# Patient Record
Sex: FEMALE | Race: WHITE | NOT HISPANIC OR LATINO | Employment: OTHER | URBAN - METROPOLITAN AREA
[De-identification: names, ages, dates, MRNs, and addresses within clinical notes are randomized per-mention and may not be internally consistent; named-entity substitution may affect disease eponyms.]

---

## 2017-03-13 ENCOUNTER — ALLSCRIPTS OFFICE VISIT (OUTPATIENT)
Dept: OTHER | Facility: OTHER | Age: 82
End: 2017-03-13

## 2017-08-04 ENCOUNTER — ALLSCRIPTS OFFICE VISIT (OUTPATIENT)
Dept: OTHER | Facility: OTHER | Age: 82
End: 2017-08-04

## 2017-08-04 ENCOUNTER — TRANSCRIBE ORDERS (OUTPATIENT)
Dept: ADMINISTRATIVE | Facility: HOSPITAL | Age: 82
End: 2017-08-04

## 2017-08-04 DIAGNOSIS — Z85.3 PERSONAL HISTORY OF MALIGNANT NEOPLASM OF BREAST: Primary | ICD-10-CM

## 2017-10-04 DIAGNOSIS — E78.5 HYPERLIPIDEMIA: ICD-10-CM

## 2017-10-04 DIAGNOSIS — E03.9 HYPOTHYROIDISM: ICD-10-CM

## 2017-10-04 DIAGNOSIS — E79.0 HYPERURICEMIA WITHOUT SIGNS OF INFLAMMATORY ARTHRITIS AND TOPHACEOUS DISEASE: ICD-10-CM

## 2017-10-04 DIAGNOSIS — I10 ESSENTIAL (PRIMARY) HYPERTENSION: ICD-10-CM

## 2017-11-15 DIAGNOSIS — Z85.3 PERSONAL HISTORY OF MALIGNANT NEOPLASM OF BREAST: ICD-10-CM

## 2017-11-27 ENCOUNTER — HOSPITAL ENCOUNTER (OUTPATIENT)
Dept: MAMMOGRAPHY | Facility: CLINIC | Age: 82
Discharge: HOME | End: 2017-11-27
Payer: MEDICARE

## 2017-11-27 ENCOUNTER — TRANSCRIBE ORDERS (OUTPATIENT)
Dept: LAB | Facility: CLINIC | Age: 82
End: 2017-11-27

## 2017-11-27 ENCOUNTER — APPOINTMENT (OUTPATIENT)
Dept: LAB | Facility: CLINIC | Age: 82
End: 2017-11-27
Payer: MEDICARE

## 2017-11-27 DIAGNOSIS — E79.0 HYPERURICEMIA WITHOUT SIGNS OF INFLAMMATORY ARTHRITIS AND TOPHACEOUS DISEASE: ICD-10-CM

## 2017-11-27 DIAGNOSIS — I10 ESSENTIAL (PRIMARY) HYPERTENSION: ICD-10-CM

## 2017-11-27 DIAGNOSIS — E03.9 HYPOTHYROIDISM: ICD-10-CM

## 2017-11-27 DIAGNOSIS — Z85.3 PERSONAL HISTORY OF MALIGNANT NEOPLASM OF BREAST: ICD-10-CM

## 2017-11-27 DIAGNOSIS — E78.5 HYPERLIPIDEMIA: ICD-10-CM

## 2017-11-27 LAB
ALBUMIN SERPL BCP-MCNC: 3.2 G/DL (ref 3.5–5)
ALP SERPL-CCNC: 188 U/L (ref 46–116)
ALT SERPL W P-5'-P-CCNC: 28 U/L (ref 12–78)
ANION GAP SERPL CALCULATED.3IONS-SCNC: 6 MMOL/L (ref 4–13)
AST SERPL W P-5'-P-CCNC: 21 U/L (ref 5–45)
BILIRUB SERPL-MCNC: 0.66 MG/DL (ref 0.2–1)
BUN SERPL-MCNC: 24 MG/DL (ref 5–25)
CALCIUM SERPL-MCNC: 8.9 MG/DL (ref 8.3–10.1)
CHLORIDE SERPL-SCNC: 104 MMOL/L (ref 100–108)
CHOLEST SERPL-MCNC: 255 MG/DL (ref 50–200)
CK SERPL-CCNC: 38 U/L (ref 26–192)
CO2 SERPL-SCNC: 28 MMOL/L (ref 21–32)
CREAT SERPL-MCNC: 1.39 MG/DL (ref 0.6–1.3)
ERYTHROCYTE [DISTWIDTH] IN BLOOD BY AUTOMATED COUNT: 14 % (ref 11.6–15.1)
GFR SERPL CREATININE-BSD FRML MDRD: 34 ML/MIN/1.73SQ M
GLUCOSE P FAST SERPL-MCNC: 117 MG/DL (ref 65–99)
HCT VFR BLD AUTO: 39.8 % (ref 34.8–46.1)
HDLC SERPL-MCNC: 57 MG/DL (ref 40–60)
HGB BLD-MCNC: 13.2 G/DL (ref 11.5–15.4)
LDLC SERPL CALC-MCNC: 164 MG/DL (ref 0–100)
MCH RBC QN AUTO: 30.1 PG (ref 26.8–34.3)
MCHC RBC AUTO-ENTMCNC: 33.2 G/DL (ref 31.4–37.4)
MCV RBC AUTO: 91 FL (ref 82–98)
PLATELET # BLD AUTO: 259 THOUSANDS/UL (ref 149–390)
PMV BLD AUTO: 11.8 FL (ref 8.9–12.7)
POTASSIUM SERPL-SCNC: 5 MMOL/L (ref 3.5–5.3)
PROT SERPL-MCNC: 7.2 G/DL (ref 6.4–8.2)
RBC # BLD AUTO: 4.39 MILLION/UL (ref 3.81–5.12)
SODIUM SERPL-SCNC: 138 MMOL/L (ref 136–145)
TRIGL SERPL-MCNC: 172 MG/DL
TSH SERPL DL<=0.05 MIU/L-ACNC: 2.5 UIU/ML (ref 0.36–3.74)
URATE SERPL-MCNC: 7.9 MG/DL (ref 2–6.8)
WBC # BLD AUTO: 10.9 THOUSAND/UL (ref 4.31–10.16)

## 2017-11-27 PROCEDURE — 80061 LIPID PANEL: CPT

## 2017-11-27 PROCEDURE — 84443 ASSAY THYROID STIM HORMONE: CPT

## 2017-11-27 PROCEDURE — 36415 COLL VENOUS BLD VENIPUNCTURE: CPT

## 2017-11-27 PROCEDURE — 84550 ASSAY OF BLOOD/URIC ACID: CPT

## 2017-11-27 PROCEDURE — 80053 COMPREHEN METABOLIC PANEL: CPT

## 2017-11-27 PROCEDURE — G0279 TOMOSYNTHESIS, MAMMO: HCPCS

## 2017-11-27 PROCEDURE — 82550 ASSAY OF CK (CPK): CPT

## 2017-11-27 PROCEDURE — 85027 COMPLETE CBC AUTOMATED: CPT

## 2017-11-27 PROCEDURE — G0204 DX MAMMO INCL CAD BI: HCPCS

## 2017-11-28 ENCOUNTER — GENERIC CONVERSION - ENCOUNTER (OUTPATIENT)
Dept: FAMILY MEDICINE CLINIC | Facility: CLINIC | Age: 82
End: 2017-11-28

## 2017-11-28 ENCOUNTER — GENERIC CONVERSION - ENCOUNTER (OUTPATIENT)
Dept: OTHER | Facility: OTHER | Age: 82
End: 2017-11-28

## 2018-01-13 VITALS
DIASTOLIC BLOOD PRESSURE: 78 MMHG | SYSTOLIC BLOOD PRESSURE: 138 MMHG | BODY MASS INDEX: 36.92 KG/M2 | WEIGHT: 216.25 LBS | HEIGHT: 64 IN

## 2018-01-13 VITALS
WEIGHT: 218 LBS | SYSTOLIC BLOOD PRESSURE: 140 MMHG | BODY MASS INDEX: 37.22 KG/M2 | HEIGHT: 64 IN | DIASTOLIC BLOOD PRESSURE: 80 MMHG

## 2018-01-14 VITALS
OXYGEN SATURATION: 96 % | SYSTOLIC BLOOD PRESSURE: 130 MMHG | HEIGHT: 64 IN | TEMPERATURE: 97.7 F | BODY MASS INDEX: 37.09 KG/M2 | HEART RATE: 66 BPM | RESPIRATION RATE: 13 BRPM | DIASTOLIC BLOOD PRESSURE: 82 MMHG | WEIGHT: 217.25 LBS

## 2018-01-15 NOTE — PROGRESS NOTES
Assessment   1  Depression (311) (F32 9)  2  Generalized anxiety disorder (300 02) (F41 1)  3  Encounter for preventive health examination (V70 0) (Z00 00)1      1 Amended By: Marcio Mcneal; Nov 18 2016 10:08 AM EST    Plan  Depression, Generalized anxiety disorder    · Renew: Sertraline HCl - 50 MG Oral Tablet; TAKE 1 TABLET BY MOUTH DAILY IN  MORNING  Insomnia    · Renew: Eszopiclone 2 MG Oral Tablet (Lunesta); TAKE 1 TABLET AT BEDTIME DAILY  AS NEEDED FOR SLEEP  Need for influenza vaccination    · Administered: Fluzone High-Dose 0 5 ML Intramuscular Suspension Prefilled Syringe    Chief Complaint  Pt is here for a Medicare Wellness Exam  Pt is here for a three month fu for HL  History of Present Illness  Welcome to Medicare and Wellness Visits: The patient is being seen for the initial annual wellness visit  Medicare Screening and Risk Factors   Hospitalizations: no previous hospitalizations  Medicare Screening Tests Risk Questions   Drug and Alcohol Use: The patient is a former cigarette smoker  The patient reports occasional alcohol use and drinking 1 drinks per month  She has never used illicit drugs  Diet and Physical Activity: Current diet includes well balanced meals, limited junk food, 2 servings of fruit per day, 1 servings of vegetables per day, 2 servings of meat per day, 2 servings of whole grains per day, 3 servings of simple carbohydrates per day, 2 servings of dairy products per day, 1 cups of coffee per day, 1 cups of tea per day and 4 glasses of water/day  She exercises infrequently  Exercise: walking 5 minutes per day  Mood Disorder and Cognitive Impairment Screening: She reports feeling down, depressed, or hopeless over the past two weeks  She reports feeling little interest or pleasure in doing things over the past two weeks     Cognitive impairment screening: denies difficulty learning/retaining new information, denies difficulty handling complex tasks, difficulty with reasoning, denies difficulty with spatial ability and orientation, denies difficulty with language and denies difficulty with behavior  Functional Ability/Level of Safety: Hearing is significantly decreased and a hearing aid is not used  She reports hearing difficulties  Activities of daily living details: transportation help needed, needs help shopping, meal preparation help needed and needs help doing laundry, but does not need help using the phone, does not need help doing housework, does not need help managing medications and does not need help managing money  Fall risk factors: The patient fell 0 times in the past 12 months  Home safety risk factors:  no unfamiliar surroundings, no loose rugs, no poor household lighting, no uneven floors, no household clutter, grab bars in the bathroom and handrails on the stairs  Advance Directives: Advance directives: living will, durable power of  for health care directives and advance directives  Co-Managers and Medical Equipment/Suppliers: See Patient Care Team   Preventive Quality Program 65 and Older: Falls Risk: The patient fell 0 times in the past 12 months  The patient is currently asymptomatic Symptoms Include:  Associated symptoms:  No associated symptoms are reported   Urinary Incontinence Symptoms includes: urinary incontinence, urinary frequency, urinary urgency, nocturia and post-void dribbling, but no incomplete bladder emptying, no urinary hesitancy, no dysuria, no straining, no weak stream, no intermittent stream, no vaginal pressure and no vaginal dryness    Date of last glaucoma screen was 10/06/2016      Patient Care Team    Care Team Member Role Specialty Office Number   [id number] [address] Attending Family Medicine [phone]   Leydi Rose MD  Cardiology [phone]   Marquita Cooper MD  Surgical Oncology [phone]   [facility] MD [address] [phone]   500 [facility], [address] [place] [address]  172[facility] Specialist Orthopedic Surgery [phone]     Active Problems   1  3-vessel coronary artery disease (414 00) (I25 10)  2  Acute gouty arthropathy (274 01) (M10 9)  3  Acute Gouty Arthropathy (274 01)  4  Arthritis (716 90) (M19 90)  5  Benign essential hypertension (401 1) (I10)  6  Breast cancer (174 9) (C50 919)  7  Breast lump (611 72) (N63)  8  Breast lump on right side at 10 o'clock position (611 72) (N63)  9  Candidal intertrigo (112 3) (B37 2)  10  Changing mole (216 9) (D22 9)  11  Depression (311) (F32 9)  12  Elevated serum alkaline phosphatase level (790 5) (R74 8)  13  Esophageal reflux (530 81) (K21 9)  14  Gait disturbance (781 2) (R26 9)  15  Generalized anxiety disorder (300 02) (F41 1)  16  Generalized osteoarthritis (715 00) (M15 9)  17  Hallucinations (780 1) (R44 3)  18  Heart disease (429 9) (I51 9)  19  Hyperlipidemia (272 4) (E78 5)  20  Hyperuricemia (790 6) (E79 0)  21  Influenza B (487 1) (J10 1)  22  Insomnia (780 52) (G47 00)  23  Knee pain, left (719 46) (M25 562)  24  Mild vitamin D deficiency (268 9) (E55 9)  25  Need for influenza vaccination (V04 81) (Z23)  26  Need for Tdap vaccination (V06 1) (Z23)  27  Neoplasm of uncertain behavior of skin of face (238 2) (D48 5)  28  Obesity (278 00) (E66 9)  29  Other chronic pain (338 29) (G89 29)  30  PAD (peripheral artery disease) (443 9) (I73 9)  31  Peripheral neuropathy (356 9) (G62 9)  32  Polymyalgia rheumatica (725) (M35 3)  33  Primary hypothyroidism (244 9) (E03 9)  34  Primary localized osteoarthritis of both knees (715 16) (M17 0)  35  Right knee pain (719 46) (M25 561)  36  Rotator cuff injury (959 2) (S46 009A)  37  Rotator cuff tear arthropathy, unspecified laterality (716 81) (M12 819)  38  Sacroiliitis (720 2) (M46 1)  39  Screening for neurological condition (V80 09) (Z13 89)  40  Seborrheic keratosis (702 19) (L82 1)  41  Spinal stenosis (724 00) (M48 00)  42   Venous insufficiency (459 81) (I87 2)    Past Medical History    · History of Acute pharyngitis (462) (J02 9)   · History of Acute upper respiratory infection (465 9) (J06 9)   · History of Arthritis (V13 4)   · History of Glaucoma screening (V80 1) (Z13 5)   · History of coronary artery bypass, six+ (V15 1) (Z95 1)   · History of malignant neoplasm of breast (V10 3) (Z85 3)   · History of malignant neoplasm of breast (V10 3) (Z85 3)   · History of urinary incontinence (V13 09) (I30 683)   · History of Right knee pain (719 46) (M25 561)   · History of Sciatica (724 3) (M54 30)   · History of Screening for depression (V79 0) (Z13 89)   · History of Screening for genitourinary condition (V81 6) (Z13 89)   · History of Skin lesion (709 9) (L98 9)   · History of Tinea pedis (110 4) (B35 3)   · History of Upper arm pain (729 5) (M79 629)    Surgical History    · History of Biopsy Breast Percutaneous Needle Core   · History of Breast Surgery Lumpectomy   · History of Cataract Surgery   · History of Heart Surgery   · History of Rotator Cuff Repair   · History of Tonsillectomy   · History of Total Abdominal Hysterectomy    Family History  Mother    · Family history of Maternal Coagulation Defect Complicating Pregnancy / Childbirth /  Puerperium  Father    · Family history of Emphysema  Daughter    · Family history of melanoma (V16 8) (Z80 8)  Sister    · Family history of Pacemaker Placement  Brother    · Family history of Coronary Arteriosclerosis (V17 49)    Social History    · Being A Social Drinker   · Denied: History of Current Smoker   · Former smoker (V15 82) (F24 614)   · Lives with adult children    Current Meds  1  Anastrozole 1 MG Oral Tablet; TAKE 1 TABLET DAILY; Therapy: 52KFL9237 to (Evaluate:07Trj8953)  Requested for: 77VRU9413; Last   Rx:22Jun2016 Ordered  2  Aspirin 81 MG Oral Tablet Chewable; CHEW AND SWALLOW 1 TABLET DAILY; Therapy: 11Aug2015 to (Evaluate:21Eyw0192)  Requested for: 11Aug2015; Last   Rx:11Aug2015 Ordered  3  Chlorthalidone 25 MG Oral Tablet; take 1/2 tablet daily Recorded  4  Levothyroxine Sodium 50 MCG Oral Tablet; take 1 tablet by mouth daily; Therapy: 66AMM2485 to (Evaluate:14Ojg9640)  Requested for: 72Sbp3249; Last   Rx:19Sep2016 Ordered  5  Lisinopril 40 MG Oral Tablet; Take 1 tablet daily; Therapy: 31JJS8615 to (Evaluate:31Jan2017)  Requested for: 26BMM0691; Last   Rx:02Nov2016 Ordered  6  LORazepam 0 5 MG Oral Tablet; take 1 tablet by mouth once daily if needed; Therapy: 81KKH1929 to (Evaluate:27Nov2016)  Requested for: 19Wmh1201; Last   Rx:19Hoc5417 Ordered  7  Magnesium TABS; Therapy: (9651 1475) to Recorded  8  Meloxicam 7 5 MG Oral Tablet; Take 1 tablet twice daily as needed; Therapy: 45AWU1676 to (Soledad Reilly)  Requested for: 67LNX2676; Last   Rx:65Doe0416 Ordered  9  Metoprolol Succinate  MG Oral Tablet Extended Release 24 Hour; take 1/2 tablet   by mouth three  a day; Therapy: 45XMI8551 to (Evaluate:54Toc9544)  Requested for: 17Aug2016; Last   Rx:17Aug2016 Ordered  10  RaNITidine HCl - 150 MG Oral Tablet; TAKE ONE TABLET BY MOUTH TWO TIMES    DAILY; Therapy: 79TVC5141 to (Evaluate:19Jan2017)  Requested for: 22Oct2016; Last    Rx:21Oct2016 Ordered  11  Sertraline HCl - 50 MG Oral Tablet; TAKE 1 TABLET BY MOUTH DAILY AS DIRECTED    Recorded  12  Vitamin B12 TABS; Therapy: (Recorded:19Yda4187) to Recorded    Allergies   1  Demerol TABS  2  TraMADol HCl TABS    Immunizations   ** Printed in Appendix #1 below  Vitals  Signs    Systolic: 560  Diastolic: 82   Height: 5 ft 4 5 in  Weight: 210 lb 8 0 oz  BMI Calculated: 35 57  BSA Calculated: 2 01    Future Appointments    Date/Time Provider Specialty Site   11/14/2017 02:00 PM DONTE Bravo   Hematology Oncology [facility] [place]   15/80/8013 99:62 PM Elizabeth Soto, [address] [place] [address]   12/06/2016 04:20 PM Majo Jones MD Cardiology [facility]   11/21/2016 01:30 PM Melinda Guzmán MD Surgical Oncology [facility]   2016 03:10 PM Patric Dennis DO Orthopedic Surgery [facility] [address]   2016 03:10 PM Patric Dennis DO Orthopedic Surgery [facility] [address]     Signatures   Electronically signed by : Arash Ray DO; 966 10:08AM EST                       (Author)    Appendix #1     Patient: Maribell Wilson ; : 1930; MRN: [id number]      1 2 3 4    Influenza  24-Oct-2012  (81y) 09-Oct-2013  (82y) 17-Sep-2014  (83y) 09-Oct-2015  (84y)    PPSV  17-Sep-2011  ([de-identified])       Tdap  Permanently Deferred: Medical Deferral, Insurance does not cover  , [id number]       Zoster  2013  (82y)

## 2018-01-16 NOTE — RESULT NOTES
Message   Mild/moderate medial compartment degenerative changes of right knee and degenerative changes left knee  Rec  OAA consult if not better  Verified Results  * XR KNEE 3 VW RIGHT NON INJURY 40Ulz6109 02:54PM Jeannette Cruz Order Number: [id number]     Test Name Result Flag Reference   XR KNEE 3 VW RIGHT (Report)     RIGHT KNEE     INDICATION: ight and left knee 3 vw non injury dx: M10 00 idiopathic gout and M25 562 pain in left knee History/Symptoms - left lateral knee pain and right posterior knee pain x 4 days  no trauma     COMPARISON: None     VIEWS: 3; 3 images     FINDINGS:     There is no acute fracture or dislocation  There is no joint effusion  Mild/moderate medial compartment degenerative changes with moderate joint space narrowing  No lytic or blastic lesions are seen  Soft tissues are unremarkable  IMPRESSION:     Mild/moderate medial compartment degenerative changes         Workstation performed: [id number]     Signed by:   Mariia Macario MD   9/13/16

## 2018-01-22 VITALS
SYSTOLIC BLOOD PRESSURE: 122 MMHG | RESPIRATION RATE: 16 BRPM | HEIGHT: 64 IN | TEMPERATURE: 96.8 F | OXYGEN SATURATION: 97 % | BODY MASS INDEX: 36.88 KG/M2 | DIASTOLIC BLOOD PRESSURE: 86 MMHG | HEART RATE: 68 BPM | WEIGHT: 216 LBS

## 2018-01-22 VITALS — HEIGHT: 64 IN | WEIGHT: 215.5 LBS | BODY MASS INDEX: 36.79 KG/M2

## 2018-01-22 VITALS — SYSTOLIC BLOOD PRESSURE: 122 MMHG | DIASTOLIC BLOOD PRESSURE: 62 MMHG

## 2018-01-30 DIAGNOSIS — R12 HEARTBURN: Primary | ICD-10-CM

## 2018-01-30 RX ORDER — RANITIDINE 150 MG/1
1 TABLET ORAL 2 TIMES DAILY
COMMUNITY
Start: 2013-05-20 | End: 2018-01-30 | Stop reason: SDUPTHER

## 2018-01-30 RX ORDER — RANITIDINE 150 MG/1
150 TABLET ORAL 2 TIMES DAILY
Qty: 180 TABLET | Refills: 0 | Status: SHIPPED | OUTPATIENT
Start: 2018-01-30 | End: 2018-04-27 | Stop reason: SDUPTHER

## 2018-01-31 DIAGNOSIS — G47.00 INSOMNIA, UNSPECIFIED TYPE: Primary | ICD-10-CM

## 2018-01-31 RX ORDER — ZOLPIDEM TARTRATE 5 MG/1
1 TABLET ORAL
COMMUNITY
Start: 2013-08-26 | End: 2018-01-31 | Stop reason: SDUPTHER

## 2018-01-31 RX ORDER — ZOLPIDEM TARTRATE 5 MG/1
5 TABLET ORAL
Qty: 90 TABLET | Refills: 0 | OUTPATIENT
Start: 2018-01-31 | End: 2018-04-20 | Stop reason: SDUPTHER

## 2018-03-01 DIAGNOSIS — I10 BENIGN ESSENTIAL HYPERTENSION: Primary | ICD-10-CM

## 2018-03-02 RX ORDER — METOPROLOL SUCCINATE 200 MG/1
TABLET, EXTENDED RELEASE ORAL
Qty: 135 TABLET | Refills: 2 | Status: SHIPPED | OUTPATIENT
Start: 2018-03-02 | End: 2018-12-03 | Stop reason: SDUPTHER

## 2018-03-08 DIAGNOSIS — F41.9 ANXIETY: Primary | ICD-10-CM

## 2018-03-08 RX ORDER — LORAZEPAM 0.5 MG/1
0.5 TABLET ORAL 2 TIMES DAILY
Qty: 180 TABLET | Refills: 0 | OUTPATIENT
Start: 2018-03-08 | End: 2018-08-29 | Stop reason: SDUPTHER

## 2018-03-08 RX ORDER — LORAZEPAM 0.5 MG/1
TABLET ORAL
COMMUNITY
Start: 2012-07-09 | End: 2018-03-08 | Stop reason: SDUPTHER

## 2018-03-26 DIAGNOSIS — F32.A DEPRESSION, UNSPECIFIED DEPRESSION TYPE: Primary | ICD-10-CM

## 2018-04-20 DIAGNOSIS — G47.00 INSOMNIA, UNSPECIFIED TYPE: ICD-10-CM

## 2018-04-20 RX ORDER — ZOLPIDEM TARTRATE 5 MG/1
5 TABLET ORAL
Qty: 90 TABLET | Refills: 0 | Status: SHIPPED | OUTPATIENT
Start: 2018-04-20 | End: 2018-08-03 | Stop reason: SDUPTHER

## 2018-04-27 DIAGNOSIS — I10 HYPERTENSION, UNSPECIFIED TYPE: Primary | ICD-10-CM

## 2018-04-27 DIAGNOSIS — R12 HEARTBURN: ICD-10-CM

## 2018-04-27 RX ORDER — LISINOPRIL 40 MG/1
1 TABLET ORAL DAILY
COMMUNITY
Start: 2012-01-12 | End: 2018-04-27 | Stop reason: SDUPTHER

## 2018-04-27 RX ORDER — RANITIDINE 150 MG/1
150 TABLET ORAL 2 TIMES DAILY
Qty: 180 TABLET | Refills: 1 | Status: SHIPPED | OUTPATIENT
Start: 2018-04-27 | End: 2018-10-24 | Stop reason: SDUPTHER

## 2018-04-27 RX ORDER — LISINOPRIL 40 MG/1
40 TABLET ORAL DAILY
Qty: 90 TABLET | Refills: 1 | Status: SHIPPED | OUTPATIENT
Start: 2018-04-27 | End: 2018-10-24 | Stop reason: SDUPTHER

## 2018-05-01 PROBLEM — C50.411 MALIGNANT NEOPLASM OF UPPER-OUTER QUADRANT OF RIGHT BREAST IN FEMALE, ESTROGEN RECEPTOR POSITIVE (HCC): Status: ACTIVE | Noted: 2018-05-01

## 2018-05-01 PROBLEM — Z17.0 MALIGNANT NEOPLASM OF UPPER-OUTER QUADRANT OF RIGHT BREAST IN FEMALE, ESTROGEN RECEPTOR POSITIVE (HCC): Status: ACTIVE | Noted: 2018-05-01

## 2018-05-01 PROBLEM — N39.46 MIXED STRESS AND URGE URINARY INCONTINENCE: Status: ACTIVE | Noted: 2017-03-13

## 2018-05-01 PROBLEM — H35.30 MACULAR DEGENERATION: Status: ACTIVE | Noted: 2017-03-13

## 2018-06-13 RX ORDER — ALLOPURINOL 100 MG/1
TABLET ORAL
Refills: 0 | COMMUNITY
Start: 2018-05-25 | End: 2018-08-22 | Stop reason: SDUPTHER

## 2018-06-13 RX ORDER — ANASTROZOLE 1 MG/1
1 TABLET ORAL DAILY
Refills: 0 | COMMUNITY
Start: 2018-05-01 | End: 2018-10-29 | Stop reason: SDUPTHER

## 2018-06-13 RX ORDER — VIT A/VIT C/VIT E/ZINC/COPPER 4296-226
CAPSULE ORAL
COMMUNITY
Start: 2017-03-13

## 2018-06-13 RX ORDER — LANOLIN ALCOHOL/MO/W.PET/CERES
CREAM (GRAM) TOPICAL
COMMUNITY

## 2018-06-13 RX ORDER — LEVOTHYROXINE SODIUM 0.05 MG/1
TABLET ORAL
Refills: 0 | COMMUNITY
Start: 2018-03-09 | End: 2018-09-05 | Stop reason: SDUPTHER

## 2018-06-13 RX ORDER — ASPIRIN 81 MG/1
1 TABLET, CHEWABLE ORAL DAILY
COMMUNITY
Start: 2015-08-11

## 2018-06-13 RX ORDER — MELOXICAM 7.5 MG/1
1 TABLET ORAL 2 TIMES DAILY PRN
COMMUNITY
Start: 2016-09-28 | End: 2018-11-23 | Stop reason: SDUPTHER

## 2018-06-14 ENCOUNTER — OFFICE VISIT (OUTPATIENT)
Dept: SURGICAL ONCOLOGY | Facility: CLINIC | Age: 83
End: 2018-06-14
Payer: MEDICARE

## 2018-06-14 VITALS
TEMPERATURE: 98.1 F | WEIGHT: 209 LBS | DIASTOLIC BLOOD PRESSURE: 80 MMHG | SYSTOLIC BLOOD PRESSURE: 140 MMHG | HEART RATE: 60 BPM | RESPIRATION RATE: 18 BRPM | BODY MASS INDEX: 35.68 KG/M2 | HEIGHT: 64 IN

## 2018-06-14 DIAGNOSIS — Z17.0 MALIGNANT NEOPLASM OF UPPER-OUTER QUADRANT OF RIGHT BREAST IN FEMALE, ESTROGEN RECEPTOR POSITIVE (HCC): Primary | ICD-10-CM

## 2018-06-14 DIAGNOSIS — C50.411 MALIGNANT NEOPLASM OF UPPER-OUTER QUADRANT OF RIGHT BREAST IN FEMALE, ESTROGEN RECEPTOR POSITIVE (HCC): Primary | ICD-10-CM

## 2018-06-14 PROCEDURE — 99213 OFFICE O/P EST LOW 20 MIN: CPT | Performed by: NURSE PRACTITIONER

## 2018-06-14 NOTE — PROGRESS NOTES
Surgical Oncology Follow Up       [address] [facility]  [facility] [place]  [address] [place] [address] [facility]  12/24/1930  [id number]  [address] [facility],[address] [facility]  [facility] [place]  [address] [place] [id number]    Chief Complaint   Patient presents with    Breast Cancer     Patient is here for a 9 month follow up       Assessment/Plan:  1  Malignant neoplasm of upper-outer quadrant of right breast in female, estrogen receptor positive ([address] )  - 9 mo f/u visit       Discussion/Summary:  Patient is an 80-year-old female who presents today for a six-month follow-up visit for right breast cancer diagnosed in May 2014  Her pathology revealed invasive ductal carcinoma, ER/%, her 2-  She underwent a right lumpectomy and sentinel node biopsy by Dr Flor Sagastume  She is currently taking anastrozole  She had a bilateral diagnostic mammogram performed on November 27, 2017 which was BI-RADS 2  No new complaints today  She denies headaches, back pain, bone pain, cough, shortness of breath, abdominal pain  She notices no changes on her self exam   The patient wishes to no longer undergo annual mammograms  She states that she performs self exams and she will contact us if she appreciates any changes  Given her age, I feel that this is reasonable  I did explain that mammograms can potentially detect a cancer at a smaller size than once that is palpable on physical exam   Patient understands this and states that she does not want to go through the process of mammograms anymore, as they are painful for her  No worrisome findings on today's exam  We will plan to see the patient back in 9 mo, or sooner if the need arises  She was instructed to call with any new concerns or symptoms  All of her questions were answered       History of Present Illness:        Malignant neoplasm of upper-outer quadrant of right breast in female, estrogen receptor positive ([facility] [address] )    5/27/2014 Initial Diagnosis     Malignant neoplasm of upper-outer quadrant of right breast in female, estrogen receptor positive ([facility] [address] )    Right breast biopsy, 11:00  Invasive ductal carcinoma  %  %  HER-2 negative         7/15/2014 Surgery     Right lumpectomy, [place] node biopsy (Dr Leonid Aburto)    Stage IIA - pT2, pN0, Grade 2         9/2014 -  Hormone Therapy     Anastrozole (Dr Stacey Murphy)             -Interval History:  Patient presents today for a 9 month follow-up visit for right breast cancer diagnosed in 2014  She had a bilateral mammogram performed on November 27, 2017 which was BI-RADS 2  She has no new complaints today  Review of Systems:  Review of Systems   Constitutional: Negative for activity change, appetite change, chills, fatigue, fever and unexpected weight change  HENT: Negative for trouble swallowing  Eyes: Negative for pain, redness and visual disturbance  Respiratory: Negative for cough, shortness of breath and wheezing  Cardiovascular: Negative for chest pain, palpitations and leg swelling  Gastrointestinal: Negative for abdominal pain, constipation, diarrhea, nausea and vomiting  Endocrine: Negative for cold intolerance and heat intolerance  Musculoskeletal: Negative for arthralgias, back pain, gait problem and myalgias  Skin: Negative for color change and rash  Neurological: Negative for dizziness, syncope, light-headedness, numbness and headaches  Hematological: Negative for adenopathy  Psychiatric/Behavioral: Negative for agitation and confusion  All other systems reviewed and are negative        Patient Active Problem List   Diagnosis    Benign essential hypertension    3-vessel coronary artery disease    Depression    Elevated serum alkaline phosphatase level    Esophageal reflux    Gait disturbance    Generalized anxiety disorder    Generalized osteoarthritis    Hyperlipidemia    Hyperuricemia    Insomnia    Macular degeneration    Mild vitamin D deficiency    Mixed stress and urge urinary incontinence    Obesity    Other chronic pain    PAD (peripheral artery disease) (HCC)    Peripheral neuropathy    Primary hypothyroidism    Primary localized osteoarthritis of both knees    Spinal stenosis    Malignant neoplasm of upper-outer quadrant of right breast in female, estrogen receptor positive ([facility] [address] )     Past Medical History:   Diagnosis Date    Arthritis     Malignant neoplasm of breast ([facility] [address] )     Resolved: Oct 26, 2015    Sciatica     last assessed: July 2, 2013     Past Surgical History:   Procedure Laterality Date    BREAST BIOPSY      BREAST LUMPECTOMY      CARDIAC SURGERY      CATARACT EXTRACTION      ROTATOR CUFF REPAIR      TONSILLECTOMY      TOTAL ABDOMINAL HYSTERECTOMY       Family History   Problem Relation Age of Onset    Other Mother         Maternal coagulation defect complicating pregnancy/childbirth/puerperium     Emphysema Father     Other Sister         Pacemaker Placement     Coronary artery disease Brother     Melanoma Daughter      Social History     Social History    Marital status:      Spouse name: N/A    Number of children: N/A    Years of education: N/A     Occupational History    Not on file       Social History Main Topics    Smoking status: Former Smoker    Smokeless tobacco: Not on file    Alcohol use Yes      Comment: social     Drug use: Unknown    Sexual activity: Not on file     Other Topics Concern    Not on file     Social History Narrative    Lives with adult children        Current Outpatient Prescriptions:     allopurinol (ZYLOPRIM) 100 mg tablet, , Disp: , Rfl: 0    anastrozole (ARIMIDEX) 1 mg tablet, Take 1 mg by mouth daily, Disp: , Rfl: 0    aspirin 81 mg chewable tablet, Chew 1 tablet daily, Disp: , Rfl:     cyanocobalamin (VITAMIN B-12) 1,000 mcg tablet, Take by mouth, Disp: , Rfl:     levothyroxine 50 mcg tablet, , Disp: , Rfl: 0   lisinopril (ZESTRIL) 40 mg tablet, Take 1 tablet (40 mg total) by mouth daily for 180 days, Disp: 90 tablet, Rfl: 1    LORazepam (ATIVAN) 0 5 mg tablet, Take 1 tablet (0 5 mg total) by mouth 2 (two) times a day, Disp: 180 tablet, Rfl: 0    Magnesium 100 MG TABS, Take by mouth, Disp: , Rfl:     meloxicam (MOBIC) 7 5 mg tablet, Take 1 tablet by mouth 2 (two) times a day as needed, Disp: , Rfl:     metoprolol succinate (TOPROL-XL) 200 MG 24 hr tablet, TAKE 1/2 TABLET BY MOUTH THREE TIMES A DAY, Disp: 135 tablet, Rfl: 2    Multiple Vitamins-Minerals (PRESERVISION AREDS) CAPS, Take by mouth, Disp: , Rfl:     ranitidine (ZANTAC) 150 mg tablet, Take 1 tablet (150 mg total) by mouth 2 (two) times a day for 180 days, Disp: 180 tablet, Rfl: 1    sertraline (ZOLOFT) 50 mg tablet, Take 1 tablet (50 mg total) by mouth daily, Disp: 90 tablet, Rfl: 3    zolpidem (AMBIEN) 5 mg tablet, Take 1 tablet (5 mg total) by mouth daily at bedtime for 90 days, Disp: 90 tablet, Rfl: 0  Allergies   Allergen Reactions    Meperidine Nausea Only    Tramadol Nausea Only     There were no vitals filed for this visit  Physical Exam   Constitutional: She is oriented to person, place, and time  Vital signs are normal  She appears well-developed and well-nourished  No distress  Walks with cane   HENT:   Head: Normocephalic and atraumatic  Neck: Normal range of motion  Cardiovascular: Normal rate, regular rhythm and normal heart sounds  Well healed mid chest incision   Pulmonary/Chest: Effort normal and breath sounds normal    Bilateral breasts were examined in the sitting and supine position  Right breast surgical scar  There are no masses, skin nodules, nipple changes or nipple discharge  There is no bilateral supraclavicular or axillary lymphadenopathy noted  Abdominal: Soft  Normal appearance  She exhibits no mass  There is no hepatosplenomegaly  There is no tenderness  Musculoskeletal: Normal range of motion  Lymphadenopathy:     She has no axillary adenopathy  Right: No supraclavicular adenopathy present  Left: No supraclavicular adenopathy present  Neurological: She is alert and oriented to person, place, and time  Skin: Skin is warm, dry and intact  No rash noted  She is not diaphoretic  Psychiatric: She has a normal mood and affect  Her speech is normal    Vitals reviewed  Advance Care Planning/Advance Directives:  Discussed disease status, cancer treatment plans and/or cancer treatment goals with the patient

## 2018-06-15 ENCOUNTER — OFFICE VISIT (OUTPATIENT)
Dept: FAMILY MEDICINE CLINIC | Facility: CLINIC | Age: 83
End: 2018-06-15
Payer: MEDICARE

## 2018-06-15 VITALS
HEIGHT: 64 IN | BODY MASS INDEX: 35.58 KG/M2 | DIASTOLIC BLOOD PRESSURE: 78 MMHG | SYSTOLIC BLOOD PRESSURE: 138 MMHG | WEIGHT: 208.4 LBS

## 2018-06-15 DIAGNOSIS — I10 BENIGN ESSENTIAL HYPERTENSION: ICD-10-CM

## 2018-06-15 DIAGNOSIS — Z23 NEED FOR SHINGLES VACCINE: Primary | ICD-10-CM

## 2018-06-15 DIAGNOSIS — E03.9 PRIMARY HYPOTHYROIDISM: ICD-10-CM

## 2018-06-15 DIAGNOSIS — E78.00 PURE HYPERCHOLESTEROLEMIA: ICD-10-CM

## 2018-06-15 PROCEDURE — G0439 PPPS, SUBSEQ VISIT: HCPCS | Performed by: FAMILY MEDICINE

## 2018-06-15 NOTE — PROGRESS NOTES
Assessment and Plan:  Problem List Items Addressed This Visit        Endocrine    Primary hypothyroidism    Relevant Medications    levothyroxine 50 mcg tablet    Other Relevant Orders    TSH, 3rd generation       Cardiovascular and Mediastinum    Benign essential hypertension    Relevant Orders    Lipid Panel with Direct LDL reflex    Comprehensive metabolic panel       Other    Hyperlipidemia    Relevant Orders    Lipid Panel with Direct LDL reflex      Other Visit Diagnoses     Need for shingles vaccine    -  Primary        Health Maintenance Due   Topic Date Due    Fall Risk  12/24/1995    Urinary Incontinence Screening  12/24/1995    GLAUCOMA SCREENING 67+ YR  12/24/1997         HPI:  Kiley Duarte is a 80 y o  female here for her Subsequent Wellness Visit  Patient Active Problem List   Diagnosis    Benign essential hypertension    3-vessel coronary artery disease    Depression    Elevated serum alkaline phosphatase level    Esophageal reflux    Gait disturbance    Generalized anxiety disorder    Generalized osteoarthritis    Hyperlipidemia    Hyperuricemia    Insomnia    Macular degeneration    Mild vitamin D deficiency    Mixed stress and urge urinary incontinence    Obesity    Other chronic pain    PAD (peripheral artery disease) (HCC)    Peripheral neuropathy    Primary hypothyroidism    Primary localized osteoarthritis of both knees    Spinal stenosis    Malignant neoplasm of upper-outer quadrant of right breast in female, estrogen receptor positive ([address] )     Past Medical History:   Diagnosis Date    Arthritis     Malignant neoplasm of breast ([address] )     Resolved:  Oct 26, 2015    Sciatica     last assessed: July 2, 2013     Past Surgical History:   Procedure Laterality Date    BREAST BIOPSY      BREAST LUMPECTOMY      CARDIAC SURGERY      CATARACT EXTRACTION      CORONARY ARTERY BYPASS GRAFT  2009    ROTATOR CUFF REPAIR      TONSILLECTOMY      TOTAL ABDOMINAL HYSTERECTOMY       Family History   Problem Relation Age of Onset    Other Mother         Maternal coagulation defect complicating pregnancy/childbirth/puerperium     Emphysema Father     Other Sister         Pacemaker Placement     Coronary artery disease Brother     Melanoma Daughter      History   Smoking Status    Former Smoker   Smokeless Tobacco    Not on file     History   Alcohol Use    Yes     Comment: social       History   Drug use: Unknown         Current Outpatient Prescriptions   Medication Sig Dispense Refill    allopurinol (ZYLOPRIM) 100 mg tablet   0    anastrozole (ARIMIDEX) 1 mg tablet Take 1 mg by mouth daily  0    aspirin 81 mg chewable tablet Chew 1 tablet daily      cyanocobalamin (VITAMIN B-12) 1,000 mcg tablet Take by mouth      levothyroxine 50 mcg tablet   0    lisinopril (ZESTRIL) 40 mg tablet Take 1 tablet (40 mg total) by mouth daily for 180 days 90 tablet 1    LORazepam (ATIVAN) 0 5 mg tablet Take 1 tablet (0 5 mg total) by mouth 2 (two) times a day 180 tablet 0    Magnesium 100 MG TABS Take by mouth      meloxicam (MOBIC) 7 5 mg tablet Take 1 tablet by mouth 2 (two) times a day as needed      metoprolol succinate (TOPROL-XL) 200 MG 24 hr tablet TAKE 1/2 TABLET BY MOUTH THREE TIMES A  tablet 2    Multiple Vitamins-Minerals (PRESERVISION AREDS) CAPS Take by mouth      ranitidine (ZANTAC) 150 mg tablet Take 1 tablet (150 mg total) by mouth 2 (two) times a day for 180 days 180 tablet 1    sertraline (ZOLOFT) 50 mg tablet Take 1 tablet (50 mg total) by mouth daily 90 tablet 3    zolpidem (AMBIEN) 5 mg tablet Take 1 tablet (5 mg total) by mouth daily at bedtime for 90 days 90 tablet 0     No current facility-administered medications for this visit        Allergies   Allergen Reactions    Meperidine Nausea Only    Tramadol Nausea Only     Immunization History   Administered Date(s) Administered    Influenza Split High Dose Preservative Free IM 10/24/2012, 10/09/2013, 09/17/2014, 10/09/2015, 11/17/2016, 11/28/2017    Pneumococcal Conjugate 13-Valent 01/17/2011, 11/28/2017    Pneumococcal Polysaccharide PPV23 09/17/2011    Zoster 02/05/2013       Patient Care Team:  Polo Cruz DO as PCP - General  Jeneane Bush, DO Devorah January, MD Adriana Boxer, MD Deberah Poling, MD Cherre Redhead,     Medicare Screening Tests and Risk Assessments:  AWV Clinical     ISAR:       Once in a Lifetime Medicare Screening:       Medicare Screening Tests and Risk Assessment:   AAA Risk Assessment    Osteoporosis Risk Assessment    HIV Risk Assessment        Drug and Alcohol Use:   Tobacco use    Cigarettes:  former smoker    Smokeless:  never used smokeless tobacco    Tobacco use duration    Tobacco Cessation Readiness    Alcohol use    Alcohol use:  rare use    Alcohol Treatment Readiness   Illicit Drug Use    Drug use:  never        Diet & Exercise:   Diet   What is your diet?:  Regular   How many servings a day of the following:   Fruits and Vegetables:  1-2 Meat:  1-2   Whole Grains:  1 Simple Carbs:  1   Dairy:  1 Soda:  0   Coffee:  1, 2 Tea:  0, 1   Exercise    Do you currently exercise?:  currently not exercising       Cognitive Impairment Screening:   Cognitive Impairment Screening        Functional Ability/Level of Safety:   Hearing    Hearing difficulties:  Yes    Hearing aid:  Yes    Hearing Impairment Assessment    Do your family members ever complain that you turn on the radio or T V  too loudly?:  No Do you find that other people have to repeat themselves when talking to you?:  Yes   Do you have difficulty hearing while talking on the phone?:  Yes Has anyone ever told you that you are speaking too loudly when talking with them?:  No   Do you have trouble hearing the doorbell or phone ringing?:  Yes    Current Activities    Help needed with the folllowing:    Using the phone:  No Transportation:  Yes   Shopping:  Yes Preparing Meals:  Yes   Doing Housework:   Yes Doing Laundry:  Yes   Managing Medications:  Yes Managing Money:  No   ADL    Feeding:  Independant   Oral hygiene and Facial grooming:  Independant   Bathing:  Independant   Upper Body Dressing:  Independant   Lower Body Dressing:  Independant   Toileting:  Independant   Bed Mobility:  Independant   Fall Risk    Are you unsteady on your feet?:  Yes   Do you have any chronic conditions that may contribute to a fall?:  Arthritis Do you rush to the bathroom potentially risking a fall?:  No   Injury History    Antidepressant Use:  Yes   Previous Fall:  Yes Alcohol Use:  No    Urinary Incontinence:  Yes       Home Safety:   Are there hazards in your environment?:  No   If you fell, would you need help to get back up from the ground?:  Yes Do you have problems or concerns getting in/out of a bed, chair, tub, or toilet?:  Yes    Is your activity limited by pain?:  Yes   Do you have handrails and grab-bars in the home?:  Yes Are emergency numbers kept by the phone and regularly updated?:  Yes   Are you and/or family members aware of the dangers of smoking in bed?:  Yes Are firearms stored securely?:  Yes   Do you have working smoke alarms and fire extinguisher?:  Yes Do all household members know how to use them?:  Yes   Have you left the stove on unsupervised?:  No    Home Safety Risk Factors   Unfamilar with surroundings:  No Uneven floors:  No   Stairs or handrail saftey risk:  No Loose rugs:  No   Household clutter:  No Poor household lighting:  No   No grab bars in bathroom:  No Further evaluation needed:  No       Advanced Directives:   Advanced Directives    Living Will:  Yes Durable POA for healthcare:  Yes   Patient's End of Life Decisions        Urinary Incontinence:   Do you have urinary incontinence?:  Yes Do you have incomplete emptying?:  Yes   Do you urinate frequently?:  No Do you have urinary urgency?:  Yes   Do you have urinary hesitancy?:  No Do you have dysuria (painful and/or difficult urination)?:  No Do you have nocturia (waking up to urinate)?:  No Do you strain when urinating (have to push to urinate)?:  No   Do you have a weak stream when urinating?:  No Do you have intermittent streaming when urinating?:  No   Do you dribble urine after finishing?:  No    Do you have vaginal pressure?:  No Do you have vaginal dryness?:  No       Glaucoma:            Provider Screening     Preventative Screening/Counseling:   Cardiovascular Screening/Counseling:   (Labs Q5 years, EKG optional one-time)   General:  Screening Current           Diabetes Screening/Counseling:   (2 tests/year if Pre-Diabetes or 1 test/year if no Diabetes)   General:  Screening Current           Colorectal Cancer Screening/Counseling:   (FOBT Q1 yr; Flex Sig Q4 yrs or Q10 yrs after Screening Colonoscopy; Screening Colonoscpy Q2 yrs High Risk or Q10 yrs Low Risk; Barium Enema Q2 yrs High Risk or Q4 yrs Low Risk)   General:  Screening Not Indicated           Prostate Cancer Screening/Counseling:   (Annual)          Breast Cancer Screening/Counseling:   (Baseline Age 28 - 43; Annual Age 36+)   General:  Screening Not Indicated          Cervical Cancer Screening/Counseling:   (Annual for High Risk or Childbearing Age with Abnormal Pap in Last 3 yrs; Every 2 all others)   General:  Screening Not Indicated           Osteoporosis Screening/Counseling:   (Every 2 Yrs if at risk or more if medically necessary)         AAA Screening/Counseling:   (Once per Lifetime with risk factors)    General:  Screening Current           Glaucoma Screening/Counseling:   (Annual)   General:  Screening Current          HIV Screening/Counseling:   (Voluntary; Once annually for high risk OR 3 times for Pregnancy at diagnosis of IUP; 3rd trimester; and at Labor   General:  Screening Not Indicated           Hepatitis C Screening:   Hepatitis C Counseling Provided: Yes               Immunizations:        Other Preventative Couseling (Non-Medicare Wellness Visit Required): Referrals (Non-Medicare Wellness Visit Required):       Medical Equipment/Suppliers:           No exam data present    Physical Exam :  General ROS: positive for  - fatigue and sleep disturbance  Musculoskeletal ROS: positive for - gait disturbance and joint stiffness  Physical Exam   Constitutional: She is oriented to person, place, and time  She appears well-developed and well-nourished  80 yr old who appears younger than stated age with elevated BMI%   HENT:   Head: Normocephalic and atraumatic  Eyes: EOM are normal  Pupils are equal, round, and reactive to light  Neck: Normal range of motion  Cardiovascular: Normal rate, regular rhythm and intact distal pulses  Pulmonary/Chest: Effort normal and breath sounds normal    Abdominal: Soft  Musculoskeletal: Normal range of motion  Neurological: She is alert and oriented to person, place, and time  Psychiatric: She has a normal mood and affect  Her behavior is normal  Judgment and thought content normal    Vitals reviewed  Vitals:    06/15/18 1402 06/15/18 1440   BP:  138/78   Weight: 94 5 kg (208 lb 6 4 oz)    Height: 5' 4" (1 626 m)    Body mass index is 35 77 kg/m²

## 2018-08-03 DIAGNOSIS — G47.00 INSOMNIA, UNSPECIFIED TYPE: ICD-10-CM

## 2018-08-03 RX ORDER — ZOLPIDEM TARTRATE 5 MG/1
TABLET ORAL
Qty: 30 TABLET | Refills: 2 | Status: SHIPPED | OUTPATIENT
Start: 2018-08-03 | End: 2018-08-16 | Stop reason: SINTOL

## 2018-08-16 ENCOUNTER — TELEPHONE (OUTPATIENT)
Dept: FAMILY MEDICINE CLINIC | Facility: CLINIC | Age: 83
End: 2018-08-16

## 2018-08-16 DIAGNOSIS — G47.01 INSOMNIA DUE TO MEDICAL CONDITION: Primary | ICD-10-CM

## 2018-08-16 RX ORDER — BUSPIRONE HYDROCHLORIDE 10 MG/1
10 TABLET ORAL
Qty: 30 TABLET | Refills: 3 | Status: SHIPPED | OUTPATIENT
Start: 2018-08-16 | End: 2018-11-16 | Stop reason: SDUPTHER

## 2018-08-16 NOTE — TELEPHONE ENCOUNTER
There is another medicine call buspirone, which has really no side effects of hallucination  Etc that may be helpful to treat her insomnia  We could actually begin that right now all she is "weaning" off the Ambien  It is a 10 mg dose and can be taken safely every night  She will be on a taken maybe about a half an hour before bedtime  I can send trial of 30 of them down to her pharmacy   There are 2 pharmacies listed in [place] so do I send to the [facility] or [facility]

## 2018-08-16 NOTE — TELEPHONE ENCOUNTER
Basically would be a good idea to stop it altogether  Can she cut it in half and try to wean off over the next week? This is an unfortunate side effect that can happen after long use of it

## 2018-08-16 NOTE — TELEPHONE ENCOUNTER
Shanika Posadas advised - she would like to know how to proceed with pt inability to sleep after medication has been weaned off  Please advise

## 2018-08-16 NOTE — TELEPHONE ENCOUNTER
Patient is having hallucinations from the Ambien  She is wondering if she can take it every other day?     #[phone]

## 2018-08-21 RX ORDER — BUSPIRONE HYDROCHLORIDE 10 MG/1
10 TABLET ORAL 3 TIMES DAILY
Qty: 30 TABLET | Refills: 3 | Status: SHIPPED | OUTPATIENT
Start: 2018-08-21 | End: 2018-11-23 | Stop reason: SDUPTHER

## 2018-08-22 DIAGNOSIS — I15.9 SECONDARY HYPERTENSION: Primary | ICD-10-CM

## 2018-08-22 DIAGNOSIS — M10.9 GOUT, UNSPECIFIED CAUSE, UNSPECIFIED CHRONICITY, UNSPECIFIED SITE: ICD-10-CM

## 2018-08-22 RX ORDER — ALLOPURINOL 100 MG/1
100 TABLET ORAL DAILY
Qty: 90 TABLET | Refills: 0 | Status: SHIPPED | OUTPATIENT
Start: 2018-08-22 | End: 2018-11-23 | Stop reason: SDUPTHER

## 2018-08-29 ENCOUNTER — TELEPHONE (OUTPATIENT)
Dept: FAMILY MEDICINE CLINIC | Facility: CLINIC | Age: 83
End: 2018-08-29

## 2018-08-29 DIAGNOSIS — F41.9 ANXIETY: ICD-10-CM

## 2018-08-29 RX ORDER — LORAZEPAM 0.5 MG/1
0.5 TABLET ORAL 2 TIMES DAILY
Qty: 180 TABLET | Refills: 0 | Status: SHIPPED | OUTPATIENT
Start: 2018-08-29 | End: 2018-12-03 | Stop reason: SDUPTHER

## 2018-08-29 RX ORDER — LORAZEPAM 0.5 MG/1
0.5 TABLET ORAL 2 TIMES DAILY
Qty: 180 TABLET | Refills: 0 | OUTPATIENT
Start: 2018-08-29 | End: 2018-08-29 | Stop reason: SDUPTHER

## 2018-08-29 NOTE — TELEPHONE ENCOUNTER
Patient's daughter called and stated that she did not wean off the Ambien  She just started the new medication  She has some sleepless nights but she is good now      VR#[phone]

## 2018-09-05 DIAGNOSIS — E03.9 ACQUIRED HYPOTHYROIDISM: Primary | ICD-10-CM

## 2018-09-05 RX ORDER — LEVOTHYROXINE SODIUM 0.05 MG/1
50 TABLET ORAL DAILY
Qty: 90 TABLET | Refills: 3 | Status: SHIPPED | OUTPATIENT
Start: 2018-09-05 | End: 2018-12-03 | Stop reason: SDUPTHER

## 2018-10-24 DIAGNOSIS — I10 HYPERTENSION, UNSPECIFIED TYPE: ICD-10-CM

## 2018-10-24 DIAGNOSIS — R12 HEARTBURN: ICD-10-CM

## 2018-10-25 RX ORDER — LISINOPRIL 40 MG/1
40 TABLET ORAL DAILY
Qty: 90 TABLET | Refills: 3 | Status: SHIPPED | OUTPATIENT
Start: 2018-10-25 | End: 2018-10-29 | Stop reason: SDUPTHER

## 2018-10-25 RX ORDER — RANITIDINE 150 MG/1
150 TABLET ORAL 2 TIMES DAILY
Qty: 180 TABLET | Refills: 3 | Status: SHIPPED | OUTPATIENT
Start: 2018-10-25 | End: 2018-10-29 | Stop reason: SDUPTHER

## 2018-10-29 DIAGNOSIS — C50.411 MALIGNANT NEOPLASM OF UPPER-OUTER QUADRANT OF RIGHT BREAST IN FEMALE, ESTROGEN RECEPTOR POSITIVE (HCC): Primary | ICD-10-CM

## 2018-10-29 DIAGNOSIS — Z17.0 MALIGNANT NEOPLASM OF UPPER-OUTER QUADRANT OF RIGHT BREAST IN FEMALE, ESTROGEN RECEPTOR POSITIVE (HCC): Primary | ICD-10-CM

## 2018-10-29 DIAGNOSIS — R12 HEARTBURN: ICD-10-CM

## 2018-10-29 DIAGNOSIS — I10 HYPERTENSION, UNSPECIFIED TYPE: ICD-10-CM

## 2018-10-29 RX ORDER — ANASTROZOLE 1 MG/1
TABLET ORAL
Qty: 90 TABLET | Refills: 1 | Status: SHIPPED | OUTPATIENT
Start: 2018-10-29 | End: 2019-07-23 | Stop reason: ALTCHOICE

## 2018-10-29 RX ORDER — RANITIDINE 150 MG/1
150 TABLET ORAL 2 TIMES DAILY
Qty: 180 TABLET | Refills: 1 | Status: SHIPPED | OUTPATIENT
Start: 2018-10-29 | End: 2019-08-05 | Stop reason: SDUPTHER

## 2018-10-29 RX ORDER — ANASTROZOLE 1 MG/1
1 TABLET ORAL DAILY
Qty: 90 TABLET | Refills: 1 | Status: SHIPPED | OUTPATIENT
Start: 2018-10-29 | End: 2018-11-16 | Stop reason: SDUPTHER

## 2018-10-29 RX ORDER — LISINOPRIL 40 MG/1
TABLET ORAL
Qty: 90 TABLET | Refills: 1 | Status: SHIPPED | OUTPATIENT
Start: 2018-10-29 | End: 2019-07-28 | Stop reason: SDUPTHER

## 2018-10-29 NOTE — TELEPHONE ENCOUNTER
Patients daughter called and stated the lisinopril and ranitidine were sent to the wrong pharm  [facility] are no longer avail in [place], they changed to [facility]  Asking for them to be resent to the [facility]  Also states the anastrozole 1mg is prescribed by Dr Evelin Stauffer but she wanted to know if you can start prescribing this for her

## 2018-11-16 ENCOUNTER — OFFICE VISIT (OUTPATIENT)
Dept: FAMILY MEDICINE CLINIC | Facility: CLINIC | Age: 83
End: 2018-11-16
Payer: MEDICARE

## 2018-11-16 VITALS
DIASTOLIC BLOOD PRESSURE: 70 MMHG | SYSTOLIC BLOOD PRESSURE: 118 MMHG | BODY MASS INDEX: 36.02 KG/M2 | WEIGHT: 211 LBS | HEIGHT: 64 IN

## 2018-11-16 DIAGNOSIS — G47.01 INSOMNIA DUE TO MEDICAL CONDITION: ICD-10-CM

## 2018-11-16 DIAGNOSIS — E03.9 PRIMARY HYPOTHYROIDISM: ICD-10-CM

## 2018-11-16 DIAGNOSIS — I10 BENIGN ESSENTIAL HYPERTENSION: Primary | ICD-10-CM

## 2018-11-16 DIAGNOSIS — R73.03 PRE-DIABETES: ICD-10-CM

## 2018-11-16 DIAGNOSIS — Z23 NEED FOR INFLUENZA VACCINATION: ICD-10-CM

## 2018-11-16 LAB
ALBUMIN SERPL-MCNC: 4 G/DL (ref 3.5–4.7)
ALBUMIN/GLOB SERPL: 1.6 {RATIO} (ref 1.2–2.2)
ALP SERPL-CCNC: 177 IU/L (ref 39–117)
ALT SERPL-CCNC: 22 IU/L (ref 0–32)
AST SERPL-CCNC: 24 IU/L (ref 0–40)
BILIRUB SERPL-MCNC: 0.4 MG/DL (ref 0–1.2)
BUN SERPL-MCNC: 29 MG/DL (ref 8–27)
BUN/CREAT SERPL: 21 (ref 12–28)
CALCIUM SERPL-MCNC: 9.3 MG/DL (ref 8.7–10.3)
CHLORIDE SERPL-SCNC: 101 MMOL/L (ref 96–106)
CHOLEST SERPL-MCNC: 226 MG/DL (ref 100–199)
CO2 SERPL-SCNC: 27 MMOL/L (ref 20–29)
CREAT SERPL-MCNC: 1.35 MG/DL (ref 0.57–1)
GLOBULIN SER-MCNC: 2.5 G/DL (ref 1.5–4.5)
GLUCOSE SERPL-MCNC: 127 MG/DL (ref 65–99)
HDLC SERPL-MCNC: 57 MG/DL
LABCORP COMMENT: NORMAL
LDLC SERPL CALC-MCNC: 139 MG/DL (ref 0–99)
POTASSIUM SERPL-SCNC: 5.1 MMOL/L (ref 3.5–5.2)
PROT SERPL-MCNC: 6.5 G/DL (ref 6–8.5)
SL AMB EGFR AFRICAN AMERICAN: 41 ML/MIN/1.73
SL AMB EGFR NON AFRICAN AMERICAN: 35 ML/MIN/1.73
SL AMB POCT HEMOGLOBIN AIC: 5.8
SODIUM SERPL-SCNC: 142 MMOL/L (ref 134–144)
TRIGL SERPL-MCNC: 151 MG/DL (ref 0–149)
TSH SERPL DL<=0.005 MIU/L-ACNC: 2.75 UIU/ML (ref 0.45–4.5)

## 2018-11-16 PROCEDURE — 90662 IIV NO PRSV INCREASED AG IM: CPT | Performed by: FAMILY MEDICINE

## 2018-11-16 PROCEDURE — G0008 ADMIN INFLUENZA VIRUS VAC: HCPCS | Performed by: FAMILY MEDICINE

## 2018-11-16 PROCEDURE — 83036 HEMOGLOBIN GLYCOSYLATED A1C: CPT | Performed by: FAMILY MEDICINE

## 2018-11-16 PROCEDURE — 99214 OFFICE O/P EST MOD 30 MIN: CPT | Performed by: FAMILY MEDICINE

## 2018-11-16 RX ORDER — TEMAZEPAM 15 MG/1
15 CAPSULE ORAL
Qty: 30 CAPSULE | Refills: 0 | Status: SHIPPED | OUTPATIENT
Start: 2018-11-16 | End: 2018-12-14 | Stop reason: SDUPTHER

## 2018-11-16 NOTE — PROGRESS NOTES
Assessment/Plan:   Diagnoses and all orders for this visit:    Benign essential hypertension    Primary hypothyroidism    Insomnia due to medical condition  -     temazepam (RESTORIL) 15 mg capsule; Take 1 capsule (15 mg total) by mouth daily at bedtime as needed for sleep    Need for influenza vaccination  -     influenza vaccine, 3102-5011, high-dose, PF 0 5 mL, for patients 65 yr+ (FLUZONE HIGH-DOSE)    Pre-diabetes  -     POCT hemoglobin A1c        Patient is here for general checkup and influenza shot  Lab work is reviewed and hemoglobin A1c is slightly in the prediabetic range at 5 8%  Discussed with daughter and patient the need for trying to cut back on car portion and sugars  With regards to insomnia will Rx temazepam   With regards to some of her hallucinations in the evening we also may consider if the temazepam is not helpful to try Remeron  Time spent 25 min with greater than 50% counseling performed  Formal follow-up in April when they are back from [facility]  Daughter will keep us updated with regards to the patient's clinical response to new strategy for sleep  Subjective:   Chief Complaint   Patient presents with    Follow-up     regarding HTN, HL and Thyroid; review labs    Medication Problem     States she is not sleeping well at night; thinks her Buspar is not working correctly    Groin Pain     rash like- dry    Immunizations     flu      Patient ID: Janette Bridges is a 80 y o  female  Patient is a pleasant 17-year-old female most 80years old in this upcoming December who is accompanied today by her daughter for a regular checkup  She lives between up here in [facility] and then spends a lot of time living with family down in [facility]  Recent lab work is reviewed  Lipid profile is slightly better  Patient is not on statin due to intolerability    Glucose is likely up and hemoglobin A1c fingerstick is done today showing the number to be 5 8% which is just on the be getting cusp of pre diabetes  The main issue is the patient's continued insomnia  Current medications are not helpful  Family and patient do note that despite what ever is done she still is seeing some visual hallucinations  Patient thinks that this would improve if she actually could sleep        The following portions of the patient's history were reviewed and updated as appropriate: allergies, current medications, past family history, past medical history, past social history, past surgical history and problem list       Review of Systems   Constitutional: Positive for fatigue  HENT: Negative for rhinorrhea  Respiratory: Negative for cough and shortness of breath  Cardiovascular: Negative for chest pain and leg swelling  Gastrointestinal: Negative for constipation and diarrhea  Genitourinary:        Some urge, wears depends when driving long trips   Musculoskeletal: Positive for arthralgias, gait problem and myalgias  Psychiatric/Behavioral: Positive for sleep disturbance           Objective:  /70   Ht 5' 3 5" (1 613 m)   Wt 95 7 kg (211 lb)   BMI 36 79 kg/m²   Component      Latest Ref Rng & Units 11/15/2018   Glucose, Random      65 - 99 mg/dL 127 (H)   BUN      8 - 27 mg/dL 29 (H)   Creatinine      0 57 - 1 00 mg/dL 1 35 (H)   eGFR Non       >59 mL/min/1 73 35 (L)   SL AMB EGFR AFRICAN AMERICAN      >59 mL/min/1 73 41 (L)   SL AMB BUN/CREATININE RATIO      12 - 28 21   Sodium      134 - 144 mmol/L 142   Potassium      3 5 - 5 2 mmol/L 5 1   Chloride      96 - 106 mmol/L 101   CO2      20 - 29 mmol/L 27   CALCIUM      8 7 - 10 3 mg/dL 9 3   SL AMB PROTEIN, TOTAL      6 0 - 8 5 g/dL 6 5   Albumin      3 5 - 4 7 g/dL 4 0   Globulin, Total      1 5 - 4 5 g/dL 2 5   Albumin/Globulin Ratio      1 2 - 2 2 1 6   TOTAL BILIRUBIN      0 0 - 1 2 mg/dL 0 4   ALKALINE PHOSPHATASE ISOENZYMES      39 - 117 IU/L 177 (H)   SL AMB AST      0 - 40 IU/L 24   SL AMB ALT      0 - 32 IU/L 22   Cholesterol      100 - 199 mg/dL 226 (H)   Triglycerides      0 - 149 mg/dL 151 (H)   HDL      >39 mg/dL 57   LDL Direct      0 - 99 mg/dL 139 (H)   TSH, POC      0 450 - 4 500 uIU/mL 2 750     Component      Latest Ref Rng & Units 11/16/2018   Hemoglobin A1C       5 8      Physical Exam   Constitutional: She is oriented to person, place, and time  She appears well-developed and well-nourished  Pleasant 15-year-old female who looks younger than her stated age with an elevated BMI of 36%   HENT:   Head: Normocephalic and atraumatic  Eyes: Pupils are equal, round, and reactive to light  EOM are normal    Neck: Normal range of motion  No thyromegaly present  Cardiovascular: Normal rate, regular rhythm and intact distal pulses  Murmur heard  Pulmonary/Chest: Effort normal and breath sounds normal  No respiratory distress  Abdominal: Soft  Bowel sounds are normal    Obese soft   Musculoskeletal:   Patient somewhat antalgic gait and slightly unsteady, decreased range of motion of lumbar spine  Decreased range of motion of right shoulder in external and internal rotation   Neurological: She is alert and oriented to person, place, and time  Psychiatric: She has a normal mood and affect  Cognition and memory are impaired     Patient does have a loose admissions but does realize they are not actually there

## 2018-11-23 DIAGNOSIS — M10.9 GOUT, UNSPECIFIED CAUSE, UNSPECIFIED CHRONICITY, UNSPECIFIED SITE: ICD-10-CM

## 2018-11-23 DIAGNOSIS — G47.01 INSOMNIA DUE TO MEDICAL CONDITION: ICD-10-CM

## 2018-11-23 DIAGNOSIS — I15.9 SECONDARY HYPERTENSION: ICD-10-CM

## 2018-11-23 RX ORDER — BUSPIRONE HYDROCHLORIDE 10 MG/1
10 TABLET ORAL 3 TIMES DAILY
Qty: 30 TABLET | Refills: 0 | Status: SHIPPED | OUTPATIENT
Start: 2018-11-23 | End: 2018-12-06 | Stop reason: SDUPTHER

## 2018-11-23 RX ORDER — BUSPIRONE HYDROCHLORIDE 10 MG/1
10 TABLET ORAL 3 TIMES DAILY
Qty: 30 TABLET | Refills: 0 | Status: SHIPPED | OUTPATIENT
Start: 2018-11-23 | End: 2018-11-23 | Stop reason: SDUPTHER

## 2018-11-23 RX ORDER — ALLOPURINOL 100 MG/1
100 TABLET ORAL DAILY
Qty: 90 TABLET | Refills: 0 | Status: SHIPPED | OUTPATIENT
Start: 2018-11-23 | End: 2018-12-06 | Stop reason: SDUPTHER

## 2018-11-23 RX ORDER — MELOXICAM 15 MG/1
15 TABLET ORAL DAILY PRN
Qty: 90 TABLET | Refills: 0 | Status: SHIPPED | OUTPATIENT
Start: 2018-11-23 | End: 2018-11-23 | Stop reason: SDUPTHER

## 2018-11-23 RX ORDER — MELOXICAM 15 MG/1
15 TABLET ORAL DAILY PRN
Qty: 90 TABLET | Refills: 0 | Status: SHIPPED | OUTPATIENT
Start: 2018-11-23 | End: 2019-09-30 | Stop reason: SDUPTHER

## 2018-11-23 RX ORDER — ALLOPURINOL 100 MG/1
100 TABLET ORAL DAILY
Qty: 90 TABLET | Refills: 0 | Status: SHIPPED | OUTPATIENT
Start: 2018-11-23 | End: 2018-11-23 | Stop reason: SDUPTHER

## 2018-11-23 NOTE — TELEPHONE ENCOUNTER
Pt daughter called stating that she needs a refill on her buspirone  That it was changed by Dr Iwona Chandra to take it 3 times a day and is asking for a 90 day refill  And refill on Meloxicam  Pt daughter stated that it is 15mg once daily 90 day supply as well as a refill on allopurinol 90 day supply   Please advise and authorize, thanks

## 2018-12-03 ENCOUNTER — TELEPHONE (OUTPATIENT)
Dept: HEMATOLOGY ONCOLOGY | Facility: CLINIC | Age: 83
End: 2018-12-03

## 2018-12-03 DIAGNOSIS — I10 BENIGN ESSENTIAL HYPERTENSION: ICD-10-CM

## 2018-12-03 DIAGNOSIS — E03.9 ACQUIRED HYPOTHYROIDISM: ICD-10-CM

## 2018-12-03 DIAGNOSIS — F41.9 ANXIETY: ICD-10-CM

## 2018-12-03 RX ORDER — METOPROLOL SUCCINATE 200 MG/1
TABLET, EXTENDED RELEASE ORAL
Qty: 135 TABLET | Refills: 0 | Status: SHIPPED | OUTPATIENT
Start: 2018-12-03 | End: 2019-04-23 | Stop reason: SDUPTHER

## 2018-12-03 RX ORDER — LORAZEPAM 0.5 MG/1
0.5 TABLET ORAL 2 TIMES DAILY
Qty: 180 TABLET | Refills: 0 | Status: SHIPPED | OUTPATIENT
Start: 2018-12-03 | End: 2019-03-04 | Stop reason: SDUPTHER

## 2018-12-03 RX ORDER — LEVOTHYROXINE SODIUM 0.05 MG/1
50 TABLET ORAL DAILY
Qty: 90 TABLET | Refills: 3 | Status: SHIPPED | OUTPATIENT
Start: 2018-12-03 | End: 2019-11-27 | Stop reason: SDUPTHER

## 2018-12-03 NOTE — TELEPHONE ENCOUNTER
They will rs sometime in January    Patient's daughter wanted you to know patient is taking her anastrozole 1mg regularly

## 2018-12-03 NOTE — TELEPHONE ENCOUNTER
Patient called the script line asking for refills for lorazepam and metoprolol  Please authorize scripts

## 2018-12-06 DIAGNOSIS — I15.9 SECONDARY HYPERTENSION: ICD-10-CM

## 2018-12-06 DIAGNOSIS — M10.9 GOUT, UNSPECIFIED CAUSE, UNSPECIFIED CHRONICITY, UNSPECIFIED SITE: ICD-10-CM

## 2018-12-06 DIAGNOSIS — G47.01 INSOMNIA DUE TO MEDICAL CONDITION: ICD-10-CM

## 2018-12-06 RX ORDER — ALLOPURINOL 100 MG/1
TABLET ORAL
Qty: 90 TABLET | Refills: 0 | OUTPATIENT
Start: 2018-12-06

## 2018-12-06 RX ORDER — BUSPIRONE HYDROCHLORIDE 10 MG/1
10 TABLET ORAL 3 TIMES DAILY
Qty: 90 TABLET | Refills: 5 | Status: SHIPPED | OUTPATIENT
Start: 2018-12-06 | End: 2018-12-28 | Stop reason: SDUPTHER

## 2018-12-06 RX ORDER — ALLOPURINOL 100 MG/1
100 TABLET ORAL DAILY
Qty: 90 TABLET | Refills: 3 | Status: SHIPPED | OUTPATIENT
Start: 2018-12-06 | End: 2019-02-21 | Stop reason: SDUPTHER

## 2018-12-06 RX ORDER — BUSPIRONE HYDROCHLORIDE 10 MG/1
TABLET ORAL
Qty: 30 TABLET | Refills: 0 | OUTPATIENT
Start: 2018-12-06

## 2018-12-09 DIAGNOSIS — G47.01 INSOMNIA DUE TO MEDICAL CONDITION: ICD-10-CM

## 2018-12-10 RX ORDER — BUSPIRONE HYDROCHLORIDE 10 MG/1
TABLET ORAL
Qty: 30 TABLET | Refills: 0 | Status: SHIPPED | OUTPATIENT
Start: 2018-12-10 | End: 2019-01-17 | Stop reason: SDUPTHER

## 2018-12-14 ENCOUNTER — TELEPHONE (OUTPATIENT)
Dept: FAMILY MEDICINE CLINIC | Facility: CLINIC | Age: 83
End: 2018-12-14

## 2018-12-14 DIAGNOSIS — G47.01 INSOMNIA DUE TO MEDICAL CONDITION: ICD-10-CM

## 2018-12-14 RX ORDER — TEMAZEPAM 15 MG/1
15 CAPSULE ORAL
Qty: 30 CAPSULE | Refills: 0 | Status: SHIPPED | OUTPATIENT
Start: 2018-12-14 | End: 2019-01-14 | Stop reason: SDUPTHER

## 2018-12-28 DIAGNOSIS — G47.01 INSOMNIA DUE TO MEDICAL CONDITION: ICD-10-CM

## 2018-12-28 RX ORDER — BUSPIRONE HYDROCHLORIDE 10 MG/1
10 TABLET ORAL 3 TIMES DAILY
Qty: 90 TABLET | Refills: 0 | Status: SHIPPED | OUTPATIENT
Start: 2018-12-28 | End: 2019-01-03 | Stop reason: SDUPTHER

## 2019-01-01 NOTE — TELEPHONE ENCOUNTER
Consult time: [id number]  Lactation consult completed with patient/family of baby in NICU and the following services and/or education has been provided:  Introduced to Lactation Services- staff and hours available for consultation  Began teaching record implementation, see patient education record  Breast pump use in postpartum and then at infant's bedside   Pumping and storage of breast milk for NICU baby - syringes then containers.  Encouraged mom to use her Pumping Log to keep her on track and log volume expressed.  Discussed hands on pumping and Hand expression.  Importance of STS when able.  Mom to contact Lactation with any questions.  Mom reports that she BF her other 2 children, but never pumped. Mom does plan on trying to BF.   Mom was able to express 9 ml of HM.  Richa is starting 1ml every 3 hour feeds soon.  Leida OCONNELLN, RN, IBCLC     Please authorize

## 2019-01-03 DIAGNOSIS — G47.01 INSOMNIA DUE TO MEDICAL CONDITION: ICD-10-CM

## 2019-01-03 RX ORDER — BUSPIRONE HYDROCHLORIDE 10 MG/1
10 TABLET ORAL 3 TIMES DAILY
Qty: 270 TABLET | Refills: 0 | Status: SHIPPED | OUTPATIENT
Start: 2019-01-03 | End: 2019-05-17 | Stop reason: SDUPTHER

## 2019-01-03 NOTE — TELEPHONE ENCOUNTER
RX was sent to [facility] instead of [place]  RX at [facility] was cancelled  Also, patient's insurance company is requesting a 90 day RX instead of 30 day

## 2019-01-14 DIAGNOSIS — G47.01 INSOMNIA DUE TO MEDICAL CONDITION: ICD-10-CM

## 2019-01-14 RX ORDER — TEMAZEPAM 15 MG/1
15 CAPSULE ORAL
Qty: 30 CAPSULE | Refills: 0 | Status: SHIPPED | OUTPATIENT
Start: 2019-01-14 | End: 2019-02-13 | Stop reason: SDUPTHER

## 2019-01-17 DIAGNOSIS — G47.01 INSOMNIA DUE TO MEDICAL CONDITION: ICD-10-CM

## 2019-01-17 RX ORDER — BUSPIRONE HYDROCHLORIDE 10 MG/1
TABLET ORAL
Qty: 30 TABLET | Refills: 11 | Status: SHIPPED | OUTPATIENT
Start: 2019-01-17 | End: 2019-05-08

## 2019-02-13 DIAGNOSIS — G47.01 INSOMNIA DUE TO MEDICAL CONDITION: ICD-10-CM

## 2019-02-13 RX ORDER — TEMAZEPAM 15 MG/1
15 CAPSULE ORAL
Qty: 30 CAPSULE | Refills: 2 | Status: SHIPPED | OUTPATIENT
Start: 2019-02-13 | End: 2019-02-14 | Stop reason: SDUPTHER

## 2019-02-14 DIAGNOSIS — G47.01 INSOMNIA DUE TO MEDICAL CONDITION: ICD-10-CM

## 2019-02-14 RX ORDER — TEMAZEPAM 15 MG/1
15 CAPSULE ORAL
Qty: 30 CAPSULE | Refills: 1 | Status: SHIPPED | OUTPATIENT
Start: 2019-02-14 | End: 2019-04-12 | Stop reason: SDUPTHER

## 2019-02-14 NOTE — TELEPHONE ENCOUNTER
Daughter called stating we sent her script to [facility] however she is in [address] with her daughter  Needs to go to [facility] in [address]    Please sign/auth Rx, thanks

## 2019-02-21 DIAGNOSIS — M10.9 GOUT, UNSPECIFIED CAUSE, UNSPECIFIED CHRONICITY, UNSPECIFIED SITE: ICD-10-CM

## 2019-02-21 DIAGNOSIS — I15.9 SECONDARY HYPERTENSION: ICD-10-CM

## 2019-02-22 RX ORDER — ALLOPURINOL 100 MG/1
100 TABLET ORAL DAILY
Qty: 90 TABLET | Refills: 3 | Status: SHIPPED | OUTPATIENT
Start: 2019-02-22 | End: 2020-02-13

## 2019-03-04 DIAGNOSIS — F41.9 ANXIETY: ICD-10-CM

## 2019-03-04 RX ORDER — LORAZEPAM 0.5 MG/1
0.5 TABLET ORAL 2 TIMES DAILY
Qty: 180 TABLET | Refills: 0 | Status: SHIPPED | OUTPATIENT
Start: 2019-03-04 | End: 2019-05-31 | Stop reason: SDUPTHER

## 2019-04-02 DIAGNOSIS — F32.A DEPRESSION, UNSPECIFIED DEPRESSION TYPE: ICD-10-CM

## 2019-04-12 DIAGNOSIS — G47.01 INSOMNIA DUE TO MEDICAL CONDITION: ICD-10-CM

## 2019-04-12 RX ORDER — TEMAZEPAM 15 MG/1
CAPSULE ORAL
Qty: 30 CAPSULE | Refills: 0 | Status: SHIPPED | OUTPATIENT
Start: 2019-04-12 | End: 2019-04-23 | Stop reason: SDUPTHER

## 2019-04-15 ENCOUNTER — TELEPHONE (OUTPATIENT)
Dept: FAMILY MEDICINE CLINIC | Facility: CLINIC | Age: 84
End: 2019-04-15

## 2019-04-15 DIAGNOSIS — G47.01 INSOMNIA DUE TO MEDICAL CONDITION: ICD-10-CM

## 2019-04-15 RX ORDER — TEMAZEPAM 15 MG/1
CAPSULE ORAL
Qty: 30 CAPSULE | Refills: 0 | Status: CANCELLED | OUTPATIENT
Start: 2019-04-15

## 2019-04-16 ENCOUNTER — TELEPHONE (OUTPATIENT)
Dept: FAMILY MEDICINE CLINIC | Facility: CLINIC | Age: 84
End: 2019-04-16

## 2019-04-23 DIAGNOSIS — G47.01 INSOMNIA DUE TO MEDICAL CONDITION: ICD-10-CM

## 2019-04-23 DIAGNOSIS — I10 BENIGN ESSENTIAL HYPERTENSION: ICD-10-CM

## 2019-04-23 RX ORDER — METOPROLOL SUCCINATE 200 MG/1
TABLET, EXTENDED RELEASE ORAL
Qty: 135 TABLET | Refills: 0 | Status: SHIPPED | OUTPATIENT
Start: 2019-04-23 | End: 2020-06-17

## 2019-04-23 RX ORDER — TEMAZEPAM 15 MG/1
15 CAPSULE ORAL
Qty: 30 CAPSULE | Refills: 0 | Status: SHIPPED | OUTPATIENT
Start: 2019-04-23 | End: 2019-04-24 | Stop reason: SDUPTHER

## 2019-04-24 ENCOUNTER — TELEPHONE (OUTPATIENT)
Dept: FAMILY MEDICINE CLINIC | Facility: CLINIC | Age: 84
End: 2019-04-24

## 2019-04-24 DIAGNOSIS — G47.01 INSOMNIA DUE TO MEDICAL CONDITION: ICD-10-CM

## 2019-04-24 RX ORDER — TEMAZEPAM 15 MG/1
15 CAPSULE ORAL
Qty: 30 CAPSULE | Refills: 5 | Status: SHIPPED | OUTPATIENT
Start: 2019-04-24 | End: 2020-06-17

## 2019-04-30 ENCOUNTER — TELEPHONE (OUTPATIENT)
Dept: HEMATOLOGY ONCOLOGY | Facility: CLINIC | Age: 84
End: 2019-04-30

## 2019-04-30 DIAGNOSIS — I10 BENIGN ESSENTIAL HYPERTENSION: ICD-10-CM

## 2019-04-30 RX ORDER — METOPROLOL SUCCINATE 200 MG/1
TABLET, EXTENDED RELEASE ORAL
Qty: 135 TABLET | Refills: 0 | Status: SHIPPED | OUTPATIENT
Start: 2019-04-30 | End: 2019-07-15 | Stop reason: SDUPTHER

## 2019-05-01 ENCOUNTER — TELEPHONE (OUTPATIENT)
Dept: HEMATOLOGY ONCOLOGY | Facility: CLINIC | Age: 84
End: 2019-05-01

## 2019-05-02 ENCOUNTER — TELEPHONE (OUTPATIENT)
Dept: FAMILY MEDICINE CLINIC | Facility: CLINIC | Age: 84
End: 2019-05-02

## 2019-05-08 ENCOUNTER — OFFICE VISIT (OUTPATIENT)
Dept: HEMATOLOGY ONCOLOGY | Facility: CLINIC | Age: 84
End: 2019-05-08
Payer: MEDICARE

## 2019-05-08 VITALS
RESPIRATION RATE: 18 BRPM | BODY MASS INDEX: 37.05 KG/M2 | WEIGHT: 217 LBS | HEIGHT: 64 IN | SYSTOLIC BLOOD PRESSURE: 148 MMHG | HEART RATE: 59 BPM | TEMPERATURE: 98.1 F | OXYGEN SATURATION: 97 % | DIASTOLIC BLOOD PRESSURE: 80 MMHG

## 2019-05-08 DIAGNOSIS — Z17.0 MALIGNANT NEOPLASM OF UPPER-OUTER QUADRANT OF RIGHT BREAST IN FEMALE, ESTROGEN RECEPTOR POSITIVE (HCC): Primary | ICD-10-CM

## 2019-05-08 DIAGNOSIS — C50.411 MALIGNANT NEOPLASM OF UPPER-OUTER QUADRANT OF RIGHT BREAST IN FEMALE, ESTROGEN RECEPTOR POSITIVE (HCC): Primary | ICD-10-CM

## 2019-05-08 PROCEDURE — 99214 OFFICE O/P EST MOD 30 MIN: CPT | Performed by: INTERNAL MEDICINE

## 2019-05-08 RX ORDER — RANITIDINE 150 MG/1
TABLET ORAL
Refills: 1 | COMMUNITY
Start: 2019-04-30 | End: 2020-06-17

## 2019-05-09 ENCOUNTER — OFFICE VISIT (OUTPATIENT)
Dept: SURGICAL ONCOLOGY | Facility: CLINIC | Age: 84
End: 2019-05-09
Payer: MEDICARE

## 2019-05-09 VITALS
HEART RATE: 71 BPM | TEMPERATURE: 97.4 F | SYSTOLIC BLOOD PRESSURE: 130 MMHG | BODY MASS INDEX: 37.05 KG/M2 | WEIGHT: 217 LBS | RESPIRATION RATE: 16 BRPM | HEIGHT: 64 IN | DIASTOLIC BLOOD PRESSURE: 80 MMHG

## 2019-05-09 DIAGNOSIS — Z08 ENCOUNTER FOR FOLLOW-UP EXAMINATION AFTER COMPLETED TREATMENT FOR MALIGNANT NEOPLASM: Primary | ICD-10-CM

## 2019-05-09 DIAGNOSIS — Z85.3 HISTORY OF RIGHT BREAST CANCER: ICD-10-CM

## 2019-05-09 PROCEDURE — 99213 OFFICE O/P EST LOW 20 MIN: CPT | Performed by: NURSE PRACTITIONER

## 2019-05-10 ENCOUNTER — TELEPHONE (OUTPATIENT)
Dept: FAMILY MEDICINE CLINIC | Facility: CLINIC | Age: 84
End: 2019-05-10

## 2019-05-17 DIAGNOSIS — G47.01 INSOMNIA DUE TO MEDICAL CONDITION: ICD-10-CM

## 2019-05-17 RX ORDER — BUSPIRONE HYDROCHLORIDE 10 MG/1
TABLET ORAL
Qty: 270 TABLET | Refills: 0 | Status: SHIPPED | OUTPATIENT
Start: 2019-05-17 | End: 2019-10-01 | Stop reason: SDUPTHER

## 2019-05-31 DIAGNOSIS — F41.9 ANXIETY: ICD-10-CM

## 2019-05-31 RX ORDER — LORAZEPAM 0.5 MG/1
TABLET ORAL
Qty: 180 TABLET | Refills: 0 | Status: SHIPPED | OUTPATIENT
Start: 2019-05-31 | End: 2019-12-10 | Stop reason: SDUPTHER

## 2019-07-01 DIAGNOSIS — F32.A DEPRESSION, UNSPECIFIED DEPRESSION TYPE: ICD-10-CM

## 2019-07-15 ENCOUNTER — OFFICE VISIT (OUTPATIENT)
Dept: FAMILY MEDICINE CLINIC | Facility: CLINIC | Age: 84
End: 2019-07-15
Payer: MEDICARE

## 2019-07-15 DIAGNOSIS — E78.00 PURE HYPERCHOLESTEROLEMIA: ICD-10-CM

## 2019-07-15 DIAGNOSIS — I10 BENIGN ESSENTIAL HYPERTENSION: ICD-10-CM

## 2019-07-15 DIAGNOSIS — G47.01 INSOMNIA DUE TO MEDICAL CONDITION: ICD-10-CM

## 2019-07-15 DIAGNOSIS — R44.1 VISUAL HALLUCINATIONS: Primary | ICD-10-CM

## 2019-07-15 DIAGNOSIS — E03.9 PRIMARY HYPOTHYROIDISM: ICD-10-CM

## 2019-07-15 PROCEDURE — 99214 OFFICE O/P EST MOD 30 MIN: CPT | Performed by: FAMILY MEDICINE

## 2019-07-15 NOTE — PROGRESS NOTES
Assessment/Plan:     Diagnoses and all orders for this visit:    Visual hallucinations  Comments:  Genotype testing will be done to see if we can adjust medications that may help with sleep and visual hallucinations    Insomnia due to medical condition    Primary hypothyroidism  -     TSH, 3rd generation; Future    Benign essential hypertension  -     Comprehensive metabolic panel; Future    Pure hypercholesterolemia  -     Lipid Panel with Direct LDL reflex; Future    Other orders  -     Cholecalciferol (D3-1000) 1000 units tablet; Take 1,000 Units by mouth daily  -     Coenzyme Q10 (CO Q 10) 100 MG CAPS; Take by mouth        Genesight testing will be done to see if we can formulate a pharmacological strategy to help with insomnia and the visual hallucinations  Patient will need regular follow-up blood work done also before her next visit  Time spent greater than 25 minutes with greater than 50% counseling performed  Subjective:   Chief Complaint   Patient presents with    Follow-up     regarding HTN, Levothyroxine and Anxiety; pt states she has been having dreams for a couple of months but has gradually been getting worse; believes this is a medication side effect  also has c/o B/L leg pain      Patient ID: Jj Kincaid is a 80 y o  female  Patient is an 79-year-old female who is here for recheck on hypertension thyroid and also anxiety and insomnia  She is accompanied by her daughter  Patient spends most of her time living with the daughter down in [place]  They do travel up here for office visits  The daughter has been noting as well as the patient that she is having some increased problems with seeing people  Happens at nighttime and the patient feels that these are actually real individuals  She does not realize that they could possibly be dreams  She mostly sees people that she does not know    They sit in her room and sometimes they are patients that are dying and she has to watch them passed away  These people do not seem to be violent towards her but is still upset and  She does see that they do not come through the walls  I asked her if she understands that if they are approved through walls and close doors that they are not actually physical bodies  She comprehends this but still cannot wrap her head  around the difference between reality and hallucination  None the less, this is quite disturbing for her  We did discuss taking the gene sight testing to see if there may be other choices of meds that could help not only with sleep but with these visual hallucinations  Patient has had a long-time history with insomnia and sleep problems  She has some animmosity towards the daughter who she feels is not helping her as much as she should  She says her daughter tells her to work it out and keep on trying  She also argues with her that these hallucinations are real and not dreams  The following portions of the patient's history were reviewed and updated as appropriate: allergies, current medications, past family history, past medical history, past social history, past surgical history and problem list     Review of Systems   Constitutional: Positive for fatigue  HENT: Negative  Eyes: Visual disturbance: Visual hallucinations  Respiratory: Negative for shortness of breath  Cardiovascular: Negative for chest pain and leg swelling  Gastrointestinal: Negative  Genitourinary: Negative  Skin: Negative  Psychiatric/Behavioral: Positive for dysphoric mood and sleep disturbance  The patient is nervous/anxious  Objective:      /80   Pulse 63   Temp 98 4 °F (36 9 °C) (Temporal)   Ht 5' 3" (1 6 m)   Wt 96 6 kg (213 lb)   SpO2 96%   BMI 37 73 kg/m²          Physical Exam   Constitutional: She appears well-developed and well-nourished     Pleasant 24-year-old female who appears slightly younger than her stated age with an elevated BMI of 37%   Eyes: Pupils are equal, round, and reactive to light  Cardiovascular: Normal rate, regular rhythm, normal heart sounds and intact distal pulses  Pulmonary/Chest: Effort normal and breath sounds normal    Abdominal: Soft  Bowel sounds are normal    Musculoskeletal:   Slight antalgic gait with forward kyphotic positioning   Neurological: She is alert  She has normal strength  She displays a negative Romberg sign  Oriented to place and time not necessarily exact date  Psychiatric: Her speech is normal  Her mood appears anxious  She exhibits abnormal recent memory  Frustrated likely hallucinating at bedtime nighttime   Vitals reviewed

## 2019-07-18 ENCOUNTER — TELEPHONE (OUTPATIENT)
Dept: FAMILY MEDICINE CLINIC | Facility: CLINIC | Age: 84
End: 2019-07-18

## 2019-07-18 NOTE — TELEPHONE ENCOUNTER
I did look at the results  I find it very odd that every single med in every single category is under the green column  I have never seen this before  It makes me think that it is not accurate  Is there way we can call the gene site directly and tell them of my concerns?

## 2019-07-19 NOTE — TELEPHONE ENCOUNTER
Spoke with Shabnam Sawyer; states there is a 0 7% chance that all meds are in green column, very rare but this is not a lab error  States to scroll down on report under PHARMACOKINETIC GENES : CYP2D6 *2A/*41 and it says NORMAL    If she is failing any meds at this time it may not be genetic related     I have a paper that was sent to me with other factors

## 2019-07-21 VITALS
BODY MASS INDEX: 37.74 KG/M2 | HEIGHT: 63 IN | OXYGEN SATURATION: 96 % | HEART RATE: 63 BPM | TEMPERATURE: 98.4 F | WEIGHT: 213 LBS | SYSTOLIC BLOOD PRESSURE: 132 MMHG | DIASTOLIC BLOOD PRESSURE: 80 MMHG

## 2019-07-21 DIAGNOSIS — G47.01 INSOMNIA DUE TO MEDICAL CONDITION: Primary | ICD-10-CM

## 2019-07-21 DIAGNOSIS — R44.1 VISUAL HALLUCINATIONS: ICD-10-CM

## 2019-07-21 RX ORDER — QUETIAPINE FUMARATE 25 MG/1
25 TABLET, FILM COATED ORAL
Qty: 30 TABLET | Refills: 1 | Status: SHIPPED | OUTPATIENT
Start: 2019-07-21 | End: 2019-08-29

## 2019-07-22 ENCOUNTER — TELEPHONE (OUTPATIENT)
Dept: FAMILY MEDICINE CLINIC | Facility: CLINIC | Age: 84
End: 2019-07-22

## 2019-07-22 NOTE — TELEPHONE ENCOUNTER
----- Message from Polo Cruz DO sent at 3/34/5038  1:46 PM EDT -----  Regarding: genesight and hallucinations  Call daughter ( and patient) After reviewing the genes site results  I am recommending Seroquel at bedtime 25 mg to start  This should not be taking with the temazepam   So were going to do a direct switch  This should help with the hallucinations  We may need to titrate the dose  I already sent the prescription to the pharmacy in [place] as I believe they are either there are going back there soon

## 2019-07-23 NOTE — TELEPHONE ENCOUNTER
Mack East Amana called the office back regarding her mom Elke Orr she was given the results of the genesight test  Stephanie's daughter was asking if there are any other medications reacting badly to her  Pt's daughter stated she takes a total of 14 pills each day  Any other meds they can get ride of  Pt takes lisinopril and  buspirone for depression and  a 3rd medication and she is still depressed  She was asking was the Temazepam not the best  Pt was asking how long should they give the Seroquel before they call back to give an update?  Stephanie's daughert was  informed that Dr Zapien is out of the office and will be back on 7/25/19

## 2019-07-23 NOTE — TELEPHONE ENCOUNTER
MY RESPONSE TO DAUGHTER'S QUESTIONS: "Essence Cornelius called the office back regarding her mom Yahir Garcia she was given the results of the genesight test  Stephanie's daughter was asking if there are any other medications reacting badly to her  Pt's daughter stated she takes a total of 14 pills each day  Any other meds they can get ride of  Pt takes lisinopril and  buspirone for depression and  a 3rd medication and she is still depressed  She was asking was the Temazepam not the best  Pt was asking how long should they give the Seroquel before they call back to give an update? Stephanie's daughert was  informed that Dr Zapien is out of the office and will be back on 7/25/19    MY RESPONSE:  The gene site testing did not reveal that any the medications are particularly bad for her  It is just that her diagnosis may include not only the depression anxiety but some slight features of psychosis/hallucinations  We cannot eliminate the thyroid and her cardiac meds which is mainly metoprolol  I know she is on a good degree of supplements and vitamins which are fine  Things like lorazepam for her anxiety can actually contribute to possibly some of the symptoms she is having  I would like to slowly wean off that  The BuSpar does not really seem to be doing a whole lot so we might want to dial that down also  It seems that the sertraline probably is not working either  The Seroquel may in fact help to cover what the BuSpar and the sertraline and lorazepam were trying to accomplish  I know this might seem a bit of a big change for the patient's so I want to get rosana's input also to these rearrangements and simplification of medications

## 2019-07-23 NOTE — TELEPHONE ENCOUNTER
I spoke to patient's daughter, she is good with the changes you recommended  She does not think her mom will be able to give much input  Daughter does like the idea of the Seroquel taking the place of a few medications and is anxious to give this a try  Daughter is agreeable to starting to cut back on the Lorazepam and Buspar and wants to know how to do this  Please advise

## 2019-07-24 NOTE — TELEPHONE ENCOUNTER
Spoke to Sandra Roper, she is aware of how to cut down on the lorazepam and Buspar  Daughter will call with an update in a few weeks

## 2019-07-24 NOTE — TELEPHONE ENCOUNTER
Basically how she would cut things back is keep the timing the same but if capable of cutting the pills in half that is what we would do for a few weeks  After that, the lorazepam would then be changed to only once daily of course half a pill  This once daily would remain probably in the earnest [place] The BuSpar than would be decreased to only twice daily which would be in the beginning of the day and at dinner time  I probably keep the BuSpar on for awhile  The lorazepam when it is once daily for about 2 weeks then discontinue

## 2019-07-25 NOTE — TELEPHONE ENCOUNTER
Sorry, forgot about the sertraline  Know that should be cut in half also  That should be done for 2 weeks and then discontinue

## 2019-07-28 DIAGNOSIS — I10 HYPERTENSION, UNSPECIFIED TYPE: ICD-10-CM

## 2019-07-28 RX ORDER — LISINOPRIL 40 MG/1
TABLET ORAL
Qty: 90 TABLET | Refills: 3 | Status: SHIPPED | OUTPATIENT
Start: 2019-07-28 | End: 2020-07-21 | Stop reason: SDUPTHER

## 2019-08-01 DIAGNOSIS — F32.A DEPRESSION, UNSPECIFIED DEPRESSION TYPE: ICD-10-CM

## 2019-08-05 DIAGNOSIS — R12 HEARTBURN: ICD-10-CM

## 2019-08-05 RX ORDER — RANITIDINE 150 MG/1
150 TABLET ORAL 2 TIMES DAILY
Qty: 180 TABLET | Refills: 1 | Status: SHIPPED | OUTPATIENT
Start: 2019-08-05 | End: 2020-01-31

## 2019-08-27 DIAGNOSIS — L21.9 SEBORRHEIC DERMATITIS: Primary | ICD-10-CM

## 2019-08-27 RX ORDER — KETOCONAZOLE 20 MG/G
CREAM TOPICAL DAILY
Qty: 30 G | Refills: 0 | Status: SHIPPED | OUTPATIENT
Start: 2019-08-27 | End: 2020-06-30 | Stop reason: SDUPTHER

## 2019-08-28 ENCOUNTER — TELEPHONE (OUTPATIENT)
Dept: FAMILY MEDICINE CLINIC | Facility: CLINIC | Age: 84
End: 2019-08-28

## 2019-08-28 DIAGNOSIS — G47.01 INSOMNIA DUE TO MEDICAL CONDITION: ICD-10-CM

## 2019-08-28 DIAGNOSIS — R44.1 VISUAL HALLUCINATIONS: ICD-10-CM

## 2019-08-28 NOTE — TELEPHONE ENCOUNTER
I spoke to the daughter, states mom is still seeing things, she is not very bad, but mom believes there is this little boy in her room every night that is messing with her stuff, a few nights she became "sad or depressed" and became like a "3year old with alligator tears"  Daughter talks her through each day  They are trying their best to help her through everything  Is her Ativan supposed to be continued or stopped?

## 2019-08-28 NOTE — TELEPHONE ENCOUNTER
----- Message from Fortunato Pederson sent at 8/28/2019  7:10 AM EDT -----  Regarding: FW: Follow-up      ----- Message -----  From: Juliocesar Harris DO  Sent: 4/31/5700   5:59 PM EDT  To: Fortunato Pederson  Subject: Follow-up                                        Call daughter (patient and daughter a [place]) I made some changes in the patient's medical regimen based on the gene site testing  Wondering if the family is seeing any change or improvement in her hallucinations?

## 2019-08-29 RX ORDER — QUETIAPINE FUMARATE 50 MG/1
50 TABLET, FILM COATED ORAL
Qty: 30 TABLET | Refills: 1 | Status: SHIPPED | OUTPATIENT
Start: 2019-08-29 | End: 2020-06-17

## 2019-08-29 NOTE — TELEPHONE ENCOUNTER
Daughter aware, this is what she is doing  Daughter doesn't think the sleeping pill is working well for her, she doesn't sleep much at all, last night was up in the middle of the night again

## 2019-08-29 NOTE — TELEPHONE ENCOUNTER
The Quintepine (seroquel) was started at 25 mg  I would like to increase the dose to 50 mg  What ever they have left of the 25 mg she should double up and I did put a new prescription for the 50 mg   This may help

## 2019-09-16 DIAGNOSIS — G47.01 INSOMNIA DUE TO MEDICAL CONDITION: ICD-10-CM

## 2019-09-16 DIAGNOSIS — R44.1 VISUAL HALLUCINATIONS: ICD-10-CM

## 2019-09-16 RX ORDER — QUETIAPINE FUMARATE 25 MG/1
TABLET, FILM COATED ORAL
Qty: 30 TABLET | Refills: 0 | Status: SHIPPED | OUTPATIENT
Start: 2019-09-16 | End: 2019-11-27 | Stop reason: SDUPTHER

## 2019-09-27 ENCOUNTER — TELEPHONE (OUTPATIENT)
Dept: FAMILY MEDICINE CLINIC | Facility: CLINIC | Age: 84
End: 2019-09-27

## 2019-09-27 NOTE — TELEPHONE ENCOUNTER
Patient's daughter wanted you to know that the med changes made haven't helped, she is still having hallucinations, she complaints of shortness of breath at times which daughter attributes to anxiety  Daughter also wants to know if you will prescribe meloxicam 7 5 mg daily for her leg pains, knee pain

## 2019-09-27 NOTE — TELEPHONE ENCOUNTER
I spoke to the patient's daughter, she is agreeable to decreasing the seroquel to 25 mg for 2 weeks then discontinuing and starting risperdal   Can you please send the Meloxicam and Risperdal to the pharmacy in [place]?   Thanks

## 2019-09-30 DIAGNOSIS — M15.9 GENERALIZED OSTEOARTHRITIS: Primary | ICD-10-CM

## 2019-09-30 DIAGNOSIS — M10.9 GOUT, UNSPECIFIED CAUSE, UNSPECIFIED CHRONICITY, UNSPECIFIED SITE: ICD-10-CM

## 2019-09-30 DIAGNOSIS — R44.1 VISUAL HALLUCINATIONS: ICD-10-CM

## 2019-09-30 DIAGNOSIS — F32.89 OTHER DEPRESSION: ICD-10-CM

## 2019-09-30 RX ORDER — RISPERIDONE 0.5 MG/1
0.5 TABLET, FILM COATED ORAL
Qty: 60 TABLET | Refills: 0 | Status: SHIPPED | OUTPATIENT
Start: 2019-09-30 | End: 2019-10-28 | Stop reason: SDUPTHER

## 2019-09-30 RX ORDER — MELOXICAM 7.5 MG/1
7.5 TABLET ORAL
Qty: 180 TABLET | Refills: 0 | Status: SHIPPED | OUTPATIENT
Start: 2019-09-30 | End: 2021-06-08

## 2019-10-01 DIAGNOSIS — G47.01 INSOMNIA DUE TO MEDICAL CONDITION: ICD-10-CM

## 2019-10-01 RX ORDER — BUSPIRONE HYDROCHLORIDE 10 MG/1
TABLET ORAL
Qty: 270 TABLET | Refills: 0 | Status: SHIPPED | OUTPATIENT
Start: 2019-10-01 | End: 2020-06-30 | Stop reason: SDUPTHER

## 2019-10-21 ENCOUNTER — TELEPHONE (OUTPATIENT)
Dept: FAMILY MEDICINE CLINIC | Facility: CLINIC | Age: 84
End: 2019-10-21

## 2019-10-21 NOTE — TELEPHONE ENCOUNTER
If she is able to be evaluated at an urgent care to workup the shortness of breath, hold off on the Risperdal

## 2019-10-21 NOTE — TELEPHONE ENCOUNTER
Patient's daughter called stating yesterday patient was complaining of shortness of breath but daughter states she was hyperventilating, again happened this morning  Daughter is going to try to seek local medical care for this as they are not in [place]  Daughter states tonight is when she should be starting Risperdal and is questioning if she should begin this new medication

## 2019-10-22 ENCOUNTER — TELEPHONE (OUTPATIENT)
Dept: FAMILY MEDICINE CLINIC | Facility: CLINIC | Age: 84
End: 2019-10-22

## 2019-10-22 DIAGNOSIS — I10 BENIGN ESSENTIAL HYPERTENSION: ICD-10-CM

## 2019-10-22 RX ORDER — METOPROLOL SUCCINATE 200 MG/1
TABLET, EXTENDED RELEASE ORAL
Qty: 135 TABLET | Refills: 0 | Status: SHIPPED | OUTPATIENT
Start: 2019-10-22 | End: 2020-01-21

## 2019-10-22 NOTE — TELEPHONE ENCOUNTER
----- Message from Tyrell Owens DO sent at 18/99/4495  2:06 PM EDT -----  Regarding: Primary care/urgentcare  I do not know if this is near her but there is a family medicine urgent care physician Emerson Angela DO  Address is [address] [place] [address]  Phone number is [phone]

## 2019-10-22 NOTE — TELEPHONE ENCOUNTER
Daughter returned my call, she was seen at Urgent Care facility today  Vitals including O2 sat were good, nothing found on physical exam   Adair Palms complaining of phlegm in her throat, pain under her rib cage area after eating and then shortness of breath  Recommended to take Zyrtec, given Rx for Pantoprazole for reflux

## 2019-10-28 DIAGNOSIS — R44.1 VISUAL HALLUCINATIONS: ICD-10-CM

## 2019-10-28 DIAGNOSIS — F32.89 OTHER DEPRESSION: ICD-10-CM

## 2019-10-28 RX ORDER — RISPERIDONE 0.5 MG/1
TABLET, FILM COATED ORAL
Qty: 60 TABLET | Refills: 3 | Status: SHIPPED | OUTPATIENT
Start: 2019-10-28 | End: 2020-06-17

## 2019-11-27 DIAGNOSIS — G47.01 INSOMNIA DUE TO MEDICAL CONDITION: ICD-10-CM

## 2019-11-27 DIAGNOSIS — R44.1 VISUAL HALLUCINATIONS: ICD-10-CM

## 2019-11-27 DIAGNOSIS — E03.9 ACQUIRED HYPOTHYROIDISM: ICD-10-CM

## 2019-11-27 RX ORDER — QUETIAPINE FUMARATE 25 MG/1
TABLET, FILM COATED ORAL
Qty: 90 TABLET | Refills: 0 | Status: SHIPPED | OUTPATIENT
Start: 2019-11-27 | End: 2020-02-27

## 2019-11-27 RX ORDER — LEVOTHYROXINE SODIUM 0.05 MG/1
TABLET ORAL
Qty: 90 TABLET | Refills: 0 | Status: SHIPPED | OUTPATIENT
Start: 2019-11-27 | End: 2020-02-24

## 2019-12-10 DIAGNOSIS — F41.9 ANXIETY: ICD-10-CM

## 2019-12-10 RX ORDER — LORAZEPAM 0.5 MG/1
0.5 TABLET ORAL 2 TIMES DAILY
Qty: 180 TABLET | Refills: 0 | Status: SHIPPED | OUTPATIENT
Start: 2019-12-10 | End: 2020-04-28

## 2019-12-10 RX ORDER — LORAZEPAM 0.5 MG/1
TABLET ORAL
Qty: 180 TABLET | Refills: 0 | Status: SHIPPED | OUTPATIENT
Start: 2019-12-10 | End: 2019-12-10 | Stop reason: SDUPTHER

## 2020-01-21 DIAGNOSIS — I10 BENIGN ESSENTIAL HYPERTENSION: ICD-10-CM

## 2020-01-21 RX ORDER — METOPROLOL SUCCINATE 200 MG/1
TABLET, EXTENDED RELEASE ORAL
Qty: 135 TABLET | Refills: 3 | Status: SHIPPED | OUTPATIENT
Start: 2020-01-21 | End: 2021-02-25

## 2020-01-31 DIAGNOSIS — R12 HEARTBURN: ICD-10-CM

## 2020-01-31 RX ORDER — RANITIDINE 150 MG/1
TABLET ORAL
Qty: 180 TABLET | Refills: 0 | Status: SHIPPED | OUTPATIENT
Start: 2020-01-31 | End: 2020-06-17

## 2020-02-13 DIAGNOSIS — M10.9 GOUT, UNSPECIFIED CAUSE, UNSPECIFIED CHRONICITY, UNSPECIFIED SITE: ICD-10-CM

## 2020-02-13 DIAGNOSIS — I15.9 SECONDARY HYPERTENSION: ICD-10-CM

## 2020-02-13 RX ORDER — ALLOPURINOL 100 MG/1
TABLET ORAL
Qty: 90 TABLET | Refills: 3 | Status: SHIPPED | OUTPATIENT
Start: 2020-02-13 | End: 2020-05-26

## 2020-02-24 DIAGNOSIS — E03.9 ACQUIRED HYPOTHYROIDISM: ICD-10-CM

## 2020-02-24 RX ORDER — LEVOTHYROXINE SODIUM 0.05 MG/1
TABLET ORAL
Qty: 90 TABLET | Refills: 3 | Status: SHIPPED | OUTPATIENT
Start: 2020-02-24 | End: 2021-02-22

## 2020-02-27 DIAGNOSIS — R44.1 VISUAL HALLUCINATIONS: ICD-10-CM

## 2020-02-27 DIAGNOSIS — G47.01 INSOMNIA DUE TO MEDICAL CONDITION: ICD-10-CM

## 2020-02-27 RX ORDER — QUETIAPINE FUMARATE 25 MG/1
TABLET, FILM COATED ORAL
Qty: 90 TABLET | Refills: 0 | Status: SHIPPED | OUTPATIENT
Start: 2020-02-27 | End: 2020-05-26

## 2020-03-16 ENCOUNTER — TELEPHONE (OUTPATIENT)
Dept: FAMILY MEDICINE CLINIC | Facility: CLINIC | Age: 85
End: 2020-03-16

## 2020-03-16 NOTE — TELEPHONE ENCOUNTER
Dr Correia, pt's daughter Roz Yang rescheduled her mom's appointment to 6/17  Pt's daughter is asking is she to have blwk done prior tho this appointment  Pt uses [facility]  Roz Yang number is [phone] pt is doing well did reschedule appointment due to COVID 19

## 2020-03-16 NOTE — TELEPHONE ENCOUNTER
There are currently lab orders that are "active" in the chart  So if she goes to [address] [place] [address] they will be able to draw

## 2020-04-28 DIAGNOSIS — F41.9 ANXIETY: ICD-10-CM

## 2020-04-28 RX ORDER — LORAZEPAM 0.5 MG/1
TABLET ORAL
Qty: 180 TABLET | Refills: 0 | Status: SHIPPED | OUTPATIENT
Start: 2020-04-28 | End: 2021-01-01

## 2020-04-29 DIAGNOSIS — K21.9 GASTROESOPHAGEAL REFLUX DISEASE WITHOUT ESOPHAGITIS: Primary | ICD-10-CM

## 2020-04-29 RX ORDER — FAMOTIDINE 20 MG/1
20 TABLET, FILM COATED ORAL 2 TIMES DAILY
Qty: 60 TABLET | Refills: 0 | Status: SHIPPED | OUTPATIENT
Start: 2020-04-29 | End: 2020-06-02 | Stop reason: SDUPTHER

## 2020-05-26 DIAGNOSIS — R44.1 VISUAL HALLUCINATIONS: ICD-10-CM

## 2020-05-26 DIAGNOSIS — M10.9 GOUT, UNSPECIFIED CAUSE, UNSPECIFIED CHRONICITY, UNSPECIFIED SITE: ICD-10-CM

## 2020-05-26 DIAGNOSIS — G47.01 INSOMNIA DUE TO MEDICAL CONDITION: ICD-10-CM

## 2020-05-26 DIAGNOSIS — I15.9 SECONDARY HYPERTENSION: ICD-10-CM

## 2020-05-26 RX ORDER — ALLOPURINOL 100 MG/1
TABLET ORAL
Qty: 90 TABLET | Refills: 3 | Status: SHIPPED | OUTPATIENT
Start: 2020-05-26 | End: 2021-02-23

## 2020-05-26 RX ORDER — QUETIAPINE FUMARATE 25 MG/1
TABLET, FILM COATED ORAL
Qty: 90 TABLET | Refills: 0 | Status: SHIPPED | OUTPATIENT
Start: 2020-05-26 | End: 2020-08-24

## 2020-06-02 DIAGNOSIS — K21.9 GASTROESOPHAGEAL REFLUX DISEASE WITHOUT ESOPHAGITIS: ICD-10-CM

## 2020-06-02 RX ORDER — FAMOTIDINE 20 MG/1
20 TABLET, FILM COATED ORAL 2 TIMES DAILY
Qty: 180 TABLET | Refills: 1 | Status: SHIPPED | OUTPATIENT
Start: 2020-06-02 | End: 2020-12-03

## 2020-06-15 ENCOUNTER — APPOINTMENT (OUTPATIENT)
Dept: LAB | Facility: CLINIC | Age: 85
End: 2020-06-15
Payer: MEDICARE

## 2020-06-15 DIAGNOSIS — E03.9 PRIMARY HYPOTHYROIDISM: ICD-10-CM

## 2020-06-15 DIAGNOSIS — I10 BENIGN ESSENTIAL HYPERTENSION: ICD-10-CM

## 2020-06-15 DIAGNOSIS — E78.00 PURE HYPERCHOLESTEROLEMIA: ICD-10-CM

## 2020-06-15 LAB
ALBUMIN SERPL BCP-MCNC: 3.2 G/DL (ref 3.5–5)
ALP SERPL-CCNC: 184 U/L (ref 46–116)
ALT SERPL W P-5'-P-CCNC: 29 U/L (ref 12–78)
ANION GAP SERPL CALCULATED.3IONS-SCNC: 3 MMOL/L (ref 4–13)
AST SERPL W P-5'-P-CCNC: 19 U/L (ref 5–45)
BILIRUB SERPL-MCNC: 0.67 MG/DL (ref 0.2–1)
BUN SERPL-MCNC: 33 MG/DL (ref 5–25)
CALCIUM SERPL-MCNC: 9.6 MG/DL (ref 8.3–10.1)
CHLORIDE SERPL-SCNC: 108 MMOL/L (ref 100–108)
CHOLEST SERPL-MCNC: 244 MG/DL (ref 50–200)
CO2 SERPL-SCNC: 28 MMOL/L (ref 21–32)
CREAT SERPL-MCNC: 1.47 MG/DL (ref 0.6–1.3)
GFR SERPL CREATININE-BSD FRML MDRD: 31 ML/MIN/1.73SQ M
GLUCOSE P FAST SERPL-MCNC: 120 MG/DL (ref 65–99)
HDLC SERPL-MCNC: 56 MG/DL
LDLC SERPL CALC-MCNC: 163 MG/DL (ref 0–100)
POTASSIUM SERPL-SCNC: 4.9 MMOL/L (ref 3.5–5.3)
PROT SERPL-MCNC: 7 G/DL (ref 6.4–8.2)
SODIUM SERPL-SCNC: 139 MMOL/L (ref 136–145)
TRIGL SERPL-MCNC: 124 MG/DL
TSH SERPL DL<=0.05 MIU/L-ACNC: 2.46 UIU/ML (ref 0.36–3.74)

## 2020-06-15 PROCEDURE — 84443 ASSAY THYROID STIM HORMONE: CPT

## 2020-06-15 PROCEDURE — 36415 COLL VENOUS BLD VENIPUNCTURE: CPT

## 2020-06-15 PROCEDURE — 80053 COMPREHEN METABOLIC PANEL: CPT

## 2020-06-15 PROCEDURE — 80061 LIPID PANEL: CPT

## 2020-06-17 ENCOUNTER — TELEPHONE (OUTPATIENT)
Dept: FAMILY MEDICINE CLINIC | Facility: CLINIC | Age: 85
End: 2020-06-17

## 2020-06-17 ENCOUNTER — OFFICE VISIT (OUTPATIENT)
Dept: FAMILY MEDICINE CLINIC | Facility: CLINIC | Age: 85
End: 2020-06-17
Payer: MEDICARE

## 2020-06-17 VITALS
RESPIRATION RATE: 16 BRPM | BODY MASS INDEX: 36.36 KG/M2 | WEIGHT: 205.2 LBS | OXYGEN SATURATION: 98 % | TEMPERATURE: 97.8 F | HEIGHT: 63 IN | HEART RATE: 61 BPM

## 2020-06-17 DIAGNOSIS — Z00.00 MEDICARE ANNUAL WELLNESS VISIT, SUBSEQUENT: Primary | ICD-10-CM

## 2020-06-17 DIAGNOSIS — E03.9 PRIMARY HYPOTHYROIDISM: ICD-10-CM

## 2020-06-17 DIAGNOSIS — I10 BENIGN ESSENTIAL HYPERTENSION: ICD-10-CM

## 2020-06-17 DIAGNOSIS — R44.1 VISUAL HALLUCINATION: ICD-10-CM

## 2020-06-17 DIAGNOSIS — M17.0 PRIMARY LOCALIZED OSTEOARTHRITIS OF BOTH KNEES: ICD-10-CM

## 2020-06-17 PROCEDURE — 1125F AMNT PAIN NOTED PAIN PRSNT: CPT | Performed by: FAMILY MEDICINE

## 2020-06-17 PROCEDURE — G0439 PPPS, SUBSEQ VISIT: HCPCS | Performed by: FAMILY MEDICINE

## 2020-06-17 PROCEDURE — 3075F SYST BP GE 130 - 139MM HG: CPT | Performed by: FAMILY MEDICINE

## 2020-06-17 PROCEDURE — 1160F RVW MEDS BY RX/DR IN RCRD: CPT | Performed by: FAMILY MEDICINE

## 2020-06-17 PROCEDURE — 4040F PNEUMOC VAC/ADMIN/RCVD: CPT | Performed by: FAMILY MEDICINE

## 2020-06-17 PROCEDURE — 3079F DIAST BP 80-89 MM HG: CPT | Performed by: FAMILY MEDICINE

## 2020-06-17 PROCEDURE — 1170F FXNL STATUS ASSESSED: CPT | Performed by: FAMILY MEDICINE

## 2020-06-17 PROCEDURE — 1036F TOBACCO NON-USER: CPT | Performed by: FAMILY MEDICINE

## 2020-06-21 PROBLEM — R44.1 VISUAL HALLUCINATION: Status: ACTIVE | Noted: 2020-06-21

## 2020-06-30 DIAGNOSIS — G47.01 INSOMNIA DUE TO MEDICAL CONDITION: ICD-10-CM

## 2020-06-30 DIAGNOSIS — L21.9 SEBORRHEIC DERMATITIS: ICD-10-CM

## 2020-06-30 RX ORDER — BUSPIRONE HYDROCHLORIDE 10 MG/1
10 TABLET ORAL 3 TIMES DAILY
Qty: 270 TABLET | Refills: 0 | Status: SHIPPED | OUTPATIENT
Start: 2020-06-30 | End: 2021-03-18 | Stop reason: SDUPTHER

## 2020-06-30 RX ORDER — KETOCONAZOLE 20 MG/G
CREAM TOPICAL DAILY
Qty: 60 G | Refills: 0 | Status: SHIPPED | OUTPATIENT
Start: 2020-06-30 | End: 2020-08-19 | Stop reason: SDUPTHER

## 2020-07-21 DIAGNOSIS — I10 HYPERTENSION, UNSPECIFIED TYPE: ICD-10-CM

## 2020-07-21 RX ORDER — LISINOPRIL 40 MG/1
40 TABLET ORAL DAILY
Qty: 90 TABLET | Refills: 0 | Status: SHIPPED | OUTPATIENT
Start: 2020-07-21 | End: 2020-10-28 | Stop reason: SDUPTHER

## 2020-07-29 ENCOUNTER — TELEPHONE (OUTPATIENT)
Dept: FAMILY MEDICINE CLINIC | Facility: CLINIC | Age: 85
End: 2020-07-29

## 2020-07-29 NOTE — TELEPHONE ENCOUNTER
Daughter called and stated that Marquis Mackenzie is back in [place] for the next 2-3 months    Any refills for now should be sent to [address] [place] [address] Rd

## 2020-07-31 ENCOUNTER — TELEPHONE (OUTPATIENT)
Dept: FAMILY MEDICINE CLINIC | Facility: CLINIC | Age: 85
End: 2020-07-31

## 2020-07-31 NOTE — TELEPHONE ENCOUNTER
Patient's daughter would like to speak to you about her mother    I did explain that you were out of the office today and returning on Monday

## 2020-08-02 NOTE — TELEPHONE ENCOUNTER
Will likely be out of the office on Monday and Tuesday for    Is there any issue that needs to be handled immediately?

## 2020-08-03 NOTE — TELEPHONE ENCOUNTER
I called and spoke to Mai Weston pt's daughter  She is aware you are out today and tomorrow  Pt's daughter aware you will return Wed 8/5  Pt's daughter Mai Weston  would like to speak with you prior to [facility]'s upcoming appointment on 8/19/20  Nothing of urgent/immedicate matter  Can wait

## 2020-08-11 ENCOUNTER — TELEMEDICINE (OUTPATIENT)
Dept: FAMILY MEDICINE CLINIC | Facility: CLINIC | Age: 85
End: 2020-08-11
Payer: MEDICARE

## 2020-08-11 DIAGNOSIS — Z20.828 EXPOSURE TO SARS-ASSOCIATED CORONAVIRUS: Primary | ICD-10-CM

## 2020-08-11 PROCEDURE — 99442 PR PHYS/QHP TELEPHONE EVALUATION 11-20 MIN: CPT | Performed by: NURSE PRACTITIONER

## 2020-08-11 NOTE — PROGRESS NOTES
COVID-19 Virtual Visit     Assessment/Plan:    Problem List Items Addressed This Visit     None      Visit Diagnoses     Exposure to SARS-associated coronavirus    -  Primary    Relevant Orders    Novel Coronavirus (COVID-19), PCR [facility] - Collected at   [facility] or [facility]        This virtual check-in was done via [employer] and patient was informed that this is a secure, HIPAA-compliant platform  She agrees to proceed     Disposition:      I referred Joy Del Rosario to one of our centralized sites for a COVID-19 swab  Remain in isolation until results given to patient  Advised patient and her son in law for them to call for any worsening symptoms or any changes in symptoms  Rest/ conservative measures  Please call the office if you are experiencing any worsening of symptoms or no symptom improvement  SOB improved with anxiety medication- SOB likely caused by anxiety symptoms and not illness  Will monitor closely  I spent 15 minutes with patient today in which greater than 50% of the time was spent in counseling/coordination of care regarding covid screen/ uri    Encounter provider RAO Sarah    Provider located at [address] [place] [phone]    Recent Visits  No visits were found meeting these conditions  Showing recent visits within past 7 days and meeting all other requirements     Today's Visits  Date Type Provider Dept   08/11/20 Telemedicine Fany Gardner, [address] [place] [address] [address] [facility] today's visits and meeting all other requirements     Future Appointments  No visits were found meeting these conditions  Showing future appointments within next 150 days and meeting all other requirements        Patient agrees to participate in a virtual check in via telephone or video visit instead of presenting to the office to address urgent/immediate medical needs  Patient is aware this is a billable service         After connecting through telephone, the patient was identified by name and date of birth  Audrey Jon was informed that this was a telemedicine visit and that the exam was being conducted confidentially over secure lines  My office door was closed  No one else was in the room  Audrey Jon acknowledged consent and understanding of privacy and security of the telemedicine visit  I informed the patient that I have reviewed her record in Epic and presented the opportunity for her to ask any questions regarding the visit today  The patient agreed to participate  It was my intent to perform this visit via video technology but the patient was not able to do a video connection so the visit was completed via audio telephone only  Graciela Vivas is a 80 y o  female who is concerned about COVID-19  She reports cough, shortness of breath and congestion  She has not experienced fever, chills, repeated shaking with chills, headache, sore throat, anorexia, fatigue, myalgias, anosmia, abdominal pain, diarrhea, nausea and vomiting She has not had contact with a person who is under investigation for or who is positive for COVID-19 within the last 14 days  She has not been hospitalized recently for fever and/or lower respiratory symptoms  Returned from [place] in the past 2 weeks  Symptom onset this morning  It was my intent to perform this visit via video technology but the patient was not able to do a video connection so the visit was completed via audio telephone only  Past Medical History:   Diagnosis Date    Arthritis     Malignant neoplasm of breast ([facility] [address] )     Resolved:  Oct 26, 2015    Sciatica     last assessed: July 2, 2013       Past Surgical History:   Procedure Laterality Date    BREAST BIOPSY      BREAST LUMPECTOMY      CARDIAC SURGERY      CATARACT EXTRACTION      CORONARY ARTERY BYPASS GRAFT  2009    ROTATOR CUFF REPAIR      TONSILLECTOMY      TOTAL ABDOMINAL HYSTERECTOMY Current Outpatient Medications   Medication Sig Dispense Refill    allopurinol (ZYLOPRIM) 100 mg tablet TAKE 1 TABLET(100 MG) BY MOUTH DAILY 90 tablet 3    aspirin 81 mg chewable tablet Chew 1 tablet daily      busPIRone (BUSPAR) 10 mg tablet Take 1 tablet (10 mg total) by mouth 3 (three) times a day 270 tablet 0    Cholecalciferol (D3-1000) 1000 units tablet Take 1,000 Units by mouth daily      Coenzyme Q10 (CO Q 10) 100 MG CAPS Take by mouth      cyanocobalamin (VITAMIN B-12) 1,000 mcg tablet Take by mouth      famotidine (PEPCID) 20 mg tablet Take 1 tablet (20 mg total) by mouth 2 (two) times a day 180 tablet 1    ketoconazole (NIZORAL) 2 % cream Apply topically daily Twice daily 60 g 0    levothyroxine 50 mcg tablet TAKE 1 TABLET(50 MCG) BY MOUTH DAILY 90 tablet 3    lisinopril (ZESTRIL) 40 mg tablet Take 1 tablet (40 mg total) by mouth daily 90 tablet 0    LORazepam (ATIVAN) 0 5 mg tablet TAKE 1 TABLET BY MOUTH TWICE DAILY 180 tablet 0    Magnesium 100 MG TABS Take by mouth      meloxicam (MOBIC) 7 5 mg tablet Take 1 tablet (7 5 mg total) by mouth 2 (two) times daily after meals 180 tablet 0    metoprolol succinate (TOPROL-XL) 200 MG 24 hr tablet TAKE 1/2 TABLET BY MOUTH THREE TIMES DAILY 135 tablet 3    Multiple Vitamins-Minerals (PRESERVISION AREDS) CAPS Take by mouth      QUEtiapine (SEROquel) 25 mg tablet TAKE 1 TABLET BY MOUTH DAILY AT BEDTIME (Patient taking differently: Take 12 5 mg by mouth daily at bedtime ) 90 tablet 0     No current facility-administered medications for this visit  Allergies   Allergen Reactions    Meperidine Nausea Only    Tramadol Nausea Only       Review of Systems   Constitutional: Negative for chills and fever  HENT: Positive for congestion and hearing loss  Negative for ear pain and sore throat  Eyes: Negative for discharge     Respiratory: Positive for cough (clearing her throat/ nothing excessive) and shortness of breath (was this morning- resolved after medicine)  Cardiovascular: Negative for chest pain  Gastrointestinal: Negative for constipation and diarrhea  Genitourinary: Negative for difficulty urinating  Musculoskeletal: Negative for joint swelling  Skin: Negative for rash  Neurological: Negative for headaches  Hematological: Negative for adenopathy  Psychiatric/Behavioral: The patient is not nervous/anxious  Video Exam  There were no vitals filed for this visit  VIRTUAL VISIT DISCLAIMER    Abril Mallory acknowledges that she has consented to an online visit or consultation  She understands that the online visit is based solely on information provided by her, and that, in the absence of a face-to-face physical evaluation by the physician, the diagnosis she receives is both limited and provisional in terms of accuracy and completeness  This is not intended to replace a full medical face-to-face evaluation by the physician  Arbil Mallory understands and accepts these terms

## 2020-08-14 DIAGNOSIS — Z20.828 EXPOSURE TO SARS-ASSOCIATED CORONAVIRUS: ICD-10-CM

## 2020-08-14 PROCEDURE — U0003 INFECTIOUS AGENT DETECTION BY NUCLEIC ACID (DNA OR RNA); SEVERE ACUTE RESPIRATORY SYNDROME CORONAVIRUS 2 (SARS-COV-2) (CORONAVIRUS DISEASE [COVID-19]), AMPLIFIED PROBE TECHNIQUE, MAKING USE OF HIGH THROUGHPUT TECHNOLOGIES AS DESCRIBED BY CMS-2020-01-R: HCPCS | Performed by: NURSE PRACTITIONER

## 2020-08-15 LAB — SARS-COV-2 RNA SPEC QL NAA+PROBE: NOT DETECTED

## 2020-08-19 ENCOUNTER — OFFICE VISIT (OUTPATIENT)
Dept: FAMILY MEDICINE CLINIC | Facility: CLINIC | Age: 85
End: 2020-08-19
Payer: MEDICARE

## 2020-08-19 VITALS
OXYGEN SATURATION: 99 % | HEART RATE: 63 BPM | BODY MASS INDEX: 35.83 KG/M2 | TEMPERATURE: 97.6 F | HEIGHT: 63 IN | SYSTOLIC BLOOD PRESSURE: 122 MMHG | WEIGHT: 202.2 LBS | DIASTOLIC BLOOD PRESSURE: 64 MMHG | RESPIRATION RATE: 16 BRPM

## 2020-08-19 DIAGNOSIS — F41.1 GENERALIZED ANXIETY DISORDER: ICD-10-CM

## 2020-08-19 DIAGNOSIS — L21.9 SEBORRHEIC DERMATITIS: ICD-10-CM

## 2020-08-19 DIAGNOSIS — R44.1 VISUAL HALLUCINATION: ICD-10-CM

## 2020-08-19 DIAGNOSIS — I25.10 3-VESSEL CORONARY ARTERY DISEASE: ICD-10-CM

## 2020-08-19 DIAGNOSIS — F32.89 OTHER DEPRESSION: ICD-10-CM

## 2020-08-19 DIAGNOSIS — M15.9 GENERALIZED OSTEOARTHRITIS: ICD-10-CM

## 2020-08-19 DIAGNOSIS — E03.9 PRIMARY HYPOTHYROIDISM: ICD-10-CM

## 2020-08-19 DIAGNOSIS — I10 BENIGN ESSENTIAL HYPERTENSION: Primary | ICD-10-CM

## 2020-08-19 PROCEDURE — 4040F PNEUMOC VAC/ADMIN/RCVD: CPT | Performed by: FAMILY MEDICINE

## 2020-08-19 PROCEDURE — 1036F TOBACCO NON-USER: CPT | Performed by: FAMILY MEDICINE

## 2020-08-19 PROCEDURE — 3078F DIAST BP <80 MM HG: CPT | Performed by: FAMILY MEDICINE

## 2020-08-19 PROCEDURE — 1160F RVW MEDS BY RX/DR IN RCRD: CPT | Performed by: FAMILY MEDICINE

## 2020-08-19 PROCEDURE — 99214 OFFICE O/P EST MOD 30 MIN: CPT | Performed by: FAMILY MEDICINE

## 2020-08-19 PROCEDURE — 3074F SYST BP LT 130 MM HG: CPT | Performed by: FAMILY MEDICINE

## 2020-08-19 RX ORDER — PANTOPRAZOLE SODIUM 40 MG/1
40 TABLET, DELAYED RELEASE ORAL DAILY PRN
COMMUNITY
End: 2020-10-05 | Stop reason: SDUPTHER

## 2020-08-19 RX ORDER — KETOCONAZOLE 20 MG/G
CREAM TOPICAL DAILY
Qty: 60 G | Refills: 1 | Status: SHIPPED | OUTPATIENT
Start: 2020-08-19 | End: 2021-06-08

## 2020-08-19 RX ORDER — CETIRIZINE HYDROCHLORIDE 10 MG/1
10 TABLET ORAL DAILY
COMMUNITY

## 2020-08-19 NOTE — PROGRESS NOTES
Assessment/Plan:   Diagnoses and all orders for this visit:    Benign essential hypertension    Primary hypothyroidism    Seborrheic dermatitis  -     ketoconazole (NIZORAL) 2 % cream; Apply topically daily Twice daily    3-vessel coronary artery disease    Generalized osteoarthritis    Other depression    Generalized anxiety disorder    Visual hallucination    Other orders  -     pantoprazole (PROTONIX) 40 mg tablet; Take 40 mg by mouth daily as needed  -     cetirizine (ZyrTEC) 10 mg tablet; Take 10 mg by mouth daily      patient will continue current modified regimen  She is much happier here in her current living situation  We will follow-up with her in 3 months  Subjective:   Chief Complaint   Patient presents with    Follow-up     2 month followup       Patient ID: Florencio Sandoval is a 80 y o  female  Patient is an 80-year-old female who is seen for recheck  She is now back up in the [facility] living with her daughter and son-in-law  She is very happy about this  There was quite contentious relationship between her and her daughter Isaías Seo while living in [place]  Patient is still having some visual hallucinations at bedtime but improved  Her daughter and son-in-law states they have discontinued her bedtime medicine    Labs reviewed  The following portions of the patient's history were reviewed and updated as appropriate: allergies, current medications, past family history, past medical history, past social history, past surgical history and problem list       Review of Systems   Constitutional: Negative for unexpected weight change  Fatigue: Stable  HENT: Negative for postnasal drip and rhinorrhea  Respiratory: Negative for cough and shortness of breath  Genitourinary: Negative  Musculoskeletal: Positive for arthralgias and myalgias  Psychiatric/Behavioral: Positive for hallucinations and sleep disturbance           Objective:      /64   Pulse 63   Temp 97 6 °F (36 4 °C) (Temporal)   Resp 16   Ht 5' 3" (1 6 m)   Wt 91 7 kg (202 lb 3 2 oz)   SpO2 99%   BMI 35 82 kg/m²          Physical Exam  Constitutional:       General: She is not in acute distress  Appearance: She is well-developed  She is obese  HENT:      Head: Normocephalic  Eyes:      Conjunctiva/sclera: Conjunctivae normal       Pupils: Pupils are equal, round, and reactive to light  Neck:      Musculoskeletal: Normal range of motion and neck supple  Cardiovascular:      Rate and Rhythm: Normal rate and regular rhythm  Pulses: Normal pulses  Heart sounds: Normal heart sounds  No murmur  Pulmonary:      Effort: Pulmonary effort is normal       Breath sounds: Normal breath sounds  Abdominal:      General: Bowel sounds are normal       Palpations: Abdomen is soft  There is no mass  Tenderness: There is no abdominal tenderness  Skin:     General: Skin is warm and dry  Neurological:      Mental Status: She is alert and oriented to person, place, and time  Deep Tendon Reflexes: Reflexes are normal and symmetric  Psychiatric:         Behavior: Behavior normal          Thought Content:  Thought content normal

## 2020-10-05 DIAGNOSIS — K21.9 GASTROESOPHAGEAL REFLUX DISEASE WITHOUT ESOPHAGITIS: Primary | ICD-10-CM

## 2020-10-05 RX ORDER — PANTOPRAZOLE SODIUM 40 MG/1
40 TABLET, DELAYED RELEASE ORAL DAILY PRN
Qty: 90 TABLET | Refills: 1 | Status: SHIPPED | OUTPATIENT
Start: 2020-10-05 | End: 2021-03-18 | Stop reason: SDUPTHER

## 2020-10-28 ENCOUNTER — IMMUNIZATIONS (OUTPATIENT)
Dept: FAMILY MEDICINE CLINIC | Facility: CLINIC | Age: 85
End: 2020-10-28
Payer: MEDICARE

## 2020-10-28 ENCOUNTER — TELEPHONE (OUTPATIENT)
Dept: FAMILY MEDICINE CLINIC | Facility: CLINIC | Age: 85
End: 2020-10-28

## 2020-10-28 DIAGNOSIS — Z23 ENCOUNTER FOR IMMUNIZATION: ICD-10-CM

## 2020-10-28 DIAGNOSIS — I10 HYPERTENSION, UNSPECIFIED TYPE: ICD-10-CM

## 2020-10-28 PROCEDURE — 90662 IIV NO PRSV INCREASED AG IM: CPT

## 2020-10-28 PROCEDURE — G0008 ADMIN INFLUENZA VIRUS VAC: HCPCS

## 2020-10-28 RX ORDER — LISINOPRIL 40 MG/1
40 TABLET ORAL DAILY
Qty: 90 TABLET | Refills: 0 | Status: SHIPPED | OUTPATIENT
Start: 2020-10-28 | End: 2021-01-26

## 2020-11-17 ENCOUNTER — OFFICE VISIT (OUTPATIENT)
Dept: FAMILY MEDICINE CLINIC | Facility: CLINIC | Age: 85
End: 2020-11-17
Payer: MEDICARE

## 2020-11-17 VITALS
HEART RATE: 58 BPM | BODY MASS INDEX: 37.14 KG/M2 | DIASTOLIC BLOOD PRESSURE: 70 MMHG | TEMPERATURE: 96.9 F | WEIGHT: 209.6 LBS | OXYGEN SATURATION: 99 % | HEIGHT: 63 IN | SYSTOLIC BLOOD PRESSURE: 112 MMHG | RESPIRATION RATE: 20 BRPM

## 2020-11-17 DIAGNOSIS — E03.9 PRIMARY HYPOTHYROIDISM: ICD-10-CM

## 2020-11-17 DIAGNOSIS — I10 BENIGN ESSENTIAL HYPERTENSION: Primary | ICD-10-CM

## 2020-11-17 DIAGNOSIS — F41.1 GENERALIZED ANXIETY DISORDER: ICD-10-CM

## 2020-11-17 DIAGNOSIS — G89.29 OTHER CHRONIC PAIN: ICD-10-CM

## 2020-11-17 PROCEDURE — 99213 OFFICE O/P EST LOW 20 MIN: CPT | Performed by: FAMILY MEDICINE

## 2020-12-03 DIAGNOSIS — K21.9 GASTROESOPHAGEAL REFLUX DISEASE WITHOUT ESOPHAGITIS: ICD-10-CM

## 2020-12-03 RX ORDER — FAMOTIDINE 20 MG/1
TABLET, FILM COATED ORAL
Qty: 180 TABLET | Refills: 1 | Status: SHIPPED | OUTPATIENT
Start: 2020-12-03 | End: 2021-05-23

## 2020-12-07 ENCOUNTER — TELEPHONE (OUTPATIENT)
Dept: FAMILY MEDICINE CLINIC | Facility: CLINIC | Age: 85
End: 2020-12-07

## 2020-12-28 ENCOUNTER — APPOINTMENT (OUTPATIENT)
Dept: LAB | Facility: CLINIC | Age: 85
End: 2020-12-28
Payer: MEDICARE

## 2020-12-28 ENCOUNTER — OFFICE VISIT (OUTPATIENT)
Dept: FAMILY MEDICINE CLINIC | Facility: CLINIC | Age: 85
End: 2020-12-28
Payer: MEDICARE

## 2020-12-28 VITALS
SYSTOLIC BLOOD PRESSURE: 136 MMHG | BODY MASS INDEX: 37.21 KG/M2 | TEMPERATURE: 97.8 F | WEIGHT: 210 LBS | OXYGEN SATURATION: 98 % | DIASTOLIC BLOOD PRESSURE: 78 MMHG | HEIGHT: 63 IN | HEART RATE: 64 BPM | RESPIRATION RATE: 18 BRPM

## 2020-12-28 DIAGNOSIS — R60.0 EDEMA OF BOTH LOWER EXTREMITIES: Primary | ICD-10-CM

## 2020-12-28 DIAGNOSIS — E66.9 OBESITY (BMI 30-39.9): ICD-10-CM

## 2020-12-28 DIAGNOSIS — I10 BENIGN ESSENTIAL HYPERTENSION: ICD-10-CM

## 2020-12-28 DIAGNOSIS — R60.0 EDEMA OF BOTH LOWER EXTREMITIES: ICD-10-CM

## 2020-12-28 LAB
ALBUMIN SERPL BCP-MCNC: 3.2 G/DL (ref 3.5–5)
ALP SERPL-CCNC: 180 U/L (ref 46–116)
ALT SERPL W P-5'-P-CCNC: 27 U/L (ref 12–78)
ANION GAP SERPL CALCULATED.3IONS-SCNC: 4 MMOL/L (ref 4–13)
AST SERPL W P-5'-P-CCNC: 17 U/L (ref 5–45)
BILIRUB SERPL-MCNC: 0.56 MG/DL (ref 0.2–1)
BUN SERPL-MCNC: 22 MG/DL (ref 5–25)
CALCIUM ALBUM COR SERPL-MCNC: 10 MG/DL (ref 8.3–10.1)
CALCIUM SERPL-MCNC: 9.4 MG/DL (ref 8.3–10.1)
CHLORIDE SERPL-SCNC: 106 MMOL/L (ref 100–108)
CO2 SERPL-SCNC: 29 MMOL/L (ref 21–32)
CREAT SERPL-MCNC: 1.53 MG/DL (ref 0.6–1.3)
GFR SERPL CREATININE-BSD FRML MDRD: 30 ML/MIN/1.73SQ M
GLUCOSE SERPL-MCNC: 94 MG/DL (ref 65–140)
POTASSIUM SERPL-SCNC: 4.5 MMOL/L (ref 3.5–5.3)
PROT SERPL-MCNC: 7 G/DL (ref 6.4–8.2)
SODIUM SERPL-SCNC: 139 MMOL/L (ref 136–145)

## 2020-12-28 PROCEDURE — 99214 OFFICE O/P EST MOD 30 MIN: CPT | Performed by: FAMILY MEDICINE

## 2020-12-28 PROCEDURE — 36415 COLL VENOUS BLD VENIPUNCTURE: CPT

## 2020-12-28 PROCEDURE — 80053 COMPREHEN METABOLIC PANEL: CPT

## 2020-12-28 RX ORDER — FUROSEMIDE 20 MG/1
20 TABLET ORAL DAILY PRN
Qty: 14 TABLET | Refills: 0 | Status: SHIPPED | OUTPATIENT
Start: 2020-12-28 | End: 2021-01-08 | Stop reason: SDUPTHER

## 2021-01-01 ENCOUNTER — TELEPHONE (OUTPATIENT)
Dept: FAMILY MEDICINE CLINIC | Facility: CLINIC | Age: 86
End: 2021-01-01

## 2021-01-01 ENCOUNTER — OFFICE VISIT (OUTPATIENT)
Dept: FAMILY MEDICINE CLINIC | Facility: CLINIC | Age: 86
End: 2021-01-01
Payer: MEDICARE

## 2021-01-01 VITALS
BODY MASS INDEX: 38.7 KG/M2 | RESPIRATION RATE: 14 BRPM | HEART RATE: 61 BPM | HEIGHT: 63 IN | OXYGEN SATURATION: 98 % | DIASTOLIC BLOOD PRESSURE: 90 MMHG | SYSTOLIC BLOOD PRESSURE: 142 MMHG | TEMPERATURE: 96.6 F | WEIGHT: 218.4 LBS

## 2021-01-01 DIAGNOSIS — K21.9 GASTROESOPHAGEAL REFLUX DISEASE WITHOUT ESOPHAGITIS: ICD-10-CM

## 2021-01-01 DIAGNOSIS — M10.9 GOUT, UNSPECIFIED CAUSE, UNSPECIFIED CHRONICITY, UNSPECIFIED SITE: ICD-10-CM

## 2021-01-01 DIAGNOSIS — F32.89 OTHER DEPRESSION: ICD-10-CM

## 2021-01-01 DIAGNOSIS — M17.0 PRIMARY LOCALIZED OSTEOARTHRITIS OF BOTH KNEES: ICD-10-CM

## 2021-01-01 DIAGNOSIS — I10 BENIGN ESSENTIAL HYPERTENSION: Primary | ICD-10-CM

## 2021-01-01 DIAGNOSIS — M15.9 GENERALIZED OSTEOARTHRITIS: ICD-10-CM

## 2021-01-01 DIAGNOSIS — E55.9 MILD VITAMIN D DEFICIENCY: ICD-10-CM

## 2021-01-01 DIAGNOSIS — E78.00 PURE HYPERCHOLESTEROLEMIA: ICD-10-CM

## 2021-01-01 DIAGNOSIS — E03.9 PRIMARY HYPOTHYROIDISM: ICD-10-CM

## 2021-01-01 DIAGNOSIS — N18.30 STAGE 3 CHRONIC KIDNEY DISEASE, UNSPECIFIED WHETHER STAGE 3A OR 3B CKD (HCC): ICD-10-CM

## 2021-01-01 DIAGNOSIS — G47.01 INSOMNIA DUE TO MEDICAL CONDITION: ICD-10-CM

## 2021-01-01 DIAGNOSIS — I10 HYPERTENSION, UNSPECIFIED TYPE: ICD-10-CM

## 2021-01-01 DIAGNOSIS — I15.9 SECONDARY HYPERTENSION: ICD-10-CM

## 2021-01-01 DIAGNOSIS — F41.1 GENERALIZED ANXIETY DISORDER: ICD-10-CM

## 2021-01-01 DIAGNOSIS — I25.10 3-VESSEL CORONARY ARTERY DISEASE: ICD-10-CM

## 2021-01-01 DIAGNOSIS — E03.9 ACQUIRED HYPOTHYROIDISM: ICD-10-CM

## 2021-01-01 PROCEDURE — 1123F ACP DISCUSS/DSCN MKR DOCD: CPT | Performed by: FAMILY MEDICINE

## 2021-01-01 PROCEDURE — G0438 PPPS, INITIAL VISIT: HCPCS | Performed by: FAMILY MEDICINE

## 2021-01-01 PROCEDURE — 99214 OFFICE O/P EST MOD 30 MIN: CPT | Performed by: FAMILY MEDICINE

## 2021-01-01 RX ORDER — QUETIAPINE FUMARATE 25 MG/1
12.5 TABLET, FILM COATED ORAL
Qty: 30 TABLET | Refills: 5 | Status: SHIPPED | OUTPATIENT
Start: 2021-01-01 | End: 2022-01-01

## 2021-01-01 RX ORDER — PANTOPRAZOLE SODIUM 40 MG/1
40 TABLET, DELAYED RELEASE ORAL DAILY
Qty: 90 TABLET | Refills: 1 | Status: SHIPPED | OUTPATIENT
Start: 2021-01-01 | End: 2021-01-01 | Stop reason: SDUPTHER

## 2021-01-01 RX ORDER — PANTOPRAZOLE SODIUM 40 MG/1
40 TABLET, DELAYED RELEASE ORAL DAILY
Qty: 90 TABLET | Refills: 1 | Status: SHIPPED | OUTPATIENT
Start: 2021-01-01 | End: 2022-01-01 | Stop reason: SDUPTHER

## 2021-01-01 RX ORDER — ALLOPURINOL 100 MG/1
100 TABLET ORAL DAILY
Qty: 90 TABLET | Refills: 0 | Status: SHIPPED | OUTPATIENT
Start: 2021-01-01 | End: 2022-01-01 | Stop reason: SDUPTHER

## 2021-01-01 RX ORDER — LEVOTHYROXINE SODIUM 0.05 MG/1
50 TABLET ORAL DAILY
Qty: 90 TABLET | Refills: 3 | Status: SHIPPED | OUTPATIENT
Start: 2021-01-01

## 2021-01-01 RX ORDER — PANTOPRAZOLE SODIUM 40 MG/1
TABLET, DELAYED RELEASE ORAL
Qty: 90 TABLET | Refills: 1 | Status: SHIPPED | OUTPATIENT
Start: 2021-01-01 | End: 2021-01-01 | Stop reason: SDUPTHER

## 2021-01-01 RX ORDER — MELOXICAM 7.5 MG/1
7.5 TABLET ORAL DAILY
Qty: 30 TABLET | Refills: 0 | Status: SHIPPED | OUTPATIENT
Start: 2021-01-01 | End: 2022-01-01

## 2021-01-01 RX ORDER — FAMOTIDINE 20 MG/1
20 TABLET, FILM COATED ORAL 2 TIMES DAILY
Qty: 180 TABLET | Refills: 1 | Status: SHIPPED | OUTPATIENT
Start: 2021-01-01 | End: 2021-01-01 | Stop reason: SDUPTHER

## 2021-01-01 RX ORDER — FAMOTIDINE 20 MG/1
20 TABLET, FILM COATED ORAL 2 TIMES DAILY
Qty: 180 TABLET | Refills: 1 | Status: SHIPPED | OUTPATIENT
Start: 2021-01-01 | End: 2022-01-01 | Stop reason: SDUPTHER

## 2021-01-01 RX ORDER — BUSPIRONE HYDROCHLORIDE 10 MG/1
10 TABLET ORAL 3 TIMES DAILY
Qty: 270 TABLET | Refills: 0 | Status: SHIPPED | OUTPATIENT
Start: 2021-01-01 | End: 2022-01-01 | Stop reason: SDUPTHER

## 2021-01-01 RX ORDER — LISINOPRIL 40 MG/1
20 TABLET ORAL DAILY
Qty: 90 TABLET | Refills: 1 | Status: SHIPPED | OUTPATIENT
Start: 2021-01-01 | End: 2022-01-01 | Stop reason: SDUPTHER

## 2021-01-08 DIAGNOSIS — R60.0 EDEMA OF BOTH LOWER EXTREMITIES: ICD-10-CM

## 2021-01-08 RX ORDER — FUROSEMIDE 20 MG/1
TABLET ORAL
Qty: 14 TABLET | Refills: 0 | OUTPATIENT
Start: 2021-01-08

## 2021-01-08 RX ORDER — FUROSEMIDE 20 MG/1
20 TABLET ORAL DAILY PRN
Qty: 30 TABLET | Refills: 1 | Status: SHIPPED | OUTPATIENT
Start: 2021-01-08 | End: 2021-01-08

## 2021-01-08 RX ORDER — FUROSEMIDE 20 MG/1
TABLET ORAL
Qty: 90 TABLET | Refills: 1 | Status: SHIPPED | OUTPATIENT
Start: 2021-01-08 | End: 2021-07-05

## 2021-01-26 DIAGNOSIS — I10 HYPERTENSION, UNSPECIFIED TYPE: ICD-10-CM

## 2021-01-26 RX ORDER — LISINOPRIL 40 MG/1
20 TABLET ORAL DAILY
Qty: 90 TABLET | Refills: 1 | Status: SHIPPED | OUTPATIENT
Start: 2021-01-26 | End: 2021-01-01 | Stop reason: SDUPTHER

## 2021-02-22 DIAGNOSIS — E03.9 ACQUIRED HYPOTHYROIDISM: ICD-10-CM

## 2021-02-22 RX ORDER — LEVOTHYROXINE SODIUM 0.05 MG/1
TABLET ORAL
Qty: 90 TABLET | Refills: 3 | Status: SHIPPED | OUTPATIENT
Start: 2021-02-22 | End: 2021-01-01 | Stop reason: SDUPTHER

## 2021-02-23 DIAGNOSIS — I15.9 SECONDARY HYPERTENSION: ICD-10-CM

## 2021-02-23 DIAGNOSIS — M10.9 GOUT, UNSPECIFIED CAUSE, UNSPECIFIED CHRONICITY, UNSPECIFIED SITE: ICD-10-CM

## 2021-02-23 RX ORDER — ALLOPURINOL 100 MG/1
TABLET ORAL
Qty: 90 TABLET | Refills: 3 | Status: SHIPPED | OUTPATIENT
Start: 2021-02-23 | End: 2021-01-01

## 2021-02-25 DIAGNOSIS — I10 BENIGN ESSENTIAL HYPERTENSION: ICD-10-CM

## 2021-02-25 RX ORDER — METOPROLOL SUCCINATE 200 MG/1
TABLET, EXTENDED RELEASE ORAL
Qty: 135 TABLET | Refills: 3 | Status: SHIPPED | OUTPATIENT
Start: 2021-02-25 | End: 2022-01-01 | Stop reason: SDUPTHER

## 2021-03-10 ENCOUNTER — OFFICE VISIT (OUTPATIENT)
Dept: FAMILY MEDICINE CLINIC | Facility: CLINIC | Age: 86
End: 2021-03-10
Payer: MEDICARE

## 2021-03-10 VITALS
HEART RATE: 63 BPM | RESPIRATION RATE: 16 BRPM | WEIGHT: 216 LBS | SYSTOLIC BLOOD PRESSURE: 132 MMHG | HEIGHT: 63 IN | TEMPERATURE: 96.8 F | OXYGEN SATURATION: 99 % | DIASTOLIC BLOOD PRESSURE: 78 MMHG | BODY MASS INDEX: 38.27 KG/M2

## 2021-03-10 DIAGNOSIS — E03.9 PRIMARY HYPOTHYROIDISM: Primary | ICD-10-CM

## 2021-03-10 DIAGNOSIS — E78.00 PURE HYPERCHOLESTEROLEMIA: ICD-10-CM

## 2021-03-10 DIAGNOSIS — E53.8 VITAMIN B12 DEFICIENCY: ICD-10-CM

## 2021-03-10 DIAGNOSIS — E79.0 HYPERURICEMIA: ICD-10-CM

## 2021-03-10 DIAGNOSIS — I25.10 3-VESSEL CORONARY ARTERY DISEASE: ICD-10-CM

## 2021-03-10 DIAGNOSIS — I10 BENIGN ESSENTIAL HYPERTENSION: ICD-10-CM

## 2021-03-10 DIAGNOSIS — E55.9 MILD VITAMIN D DEFICIENCY: ICD-10-CM

## 2021-03-10 PROCEDURE — 99214 OFFICE O/P EST MOD 30 MIN: CPT | Performed by: FAMILY MEDICINE

## 2021-03-10 NOTE — PROGRESS NOTES
Assessment/Plan:       Diagnoses and all orders for this visit:    Primary hypothyroidism  -     T3, free; Future  -     T4, free; Future  -     TSH, 3rd generation; Future    Benign essential hypertension  -     Comprehensive metabolic panel; Future    3-vessel coronary artery disease    Pure hypercholesterolemia  -     Lipid Panel with Direct LDL reflex; Future    Hyperuricemia  -     Comprehensive metabolic panel; Future    Mild vitamin D deficiency  -     Vitamin D 25 hydroxy; Future    Vitamin B12 deficiency  -     Vitamin B12; Future         Continue efforts at remaining mobile  Continue efforts at fitness compliance and dietary compliance  Discussed COVID vaccine  Patient will likely decline  Subjective:   Chief Complaint   Patient presents with    Follow-up     6 month follow up      Patient ID: Mikey Junior is a 80 y o  female  Patient is a 42-year-old female who is here for routine checkup  She is living with her daughter and son all up here in [facility]  She is doing well in her new living situation  She has had no COVID exposure  She is still not sure about the COVID vaccine  She normally does not get any immunizations and will likely decide not to get the COVID vaccine  daughter relates that her nighttime hallucinations are rare  She currently is not doing any bedtime "sleeping aids "  Patient did have malignant diagnosis of the breast in 2014  Last mammogram ordered by her surgical oncologist was in 2017  She has decided to not do any more follow-up  She also prefers not to follow-up with cardiac  She will update her yearly blood work in June prior to her next office visit with me    The following portions of the patient's history were reviewed and updated as appropriate: allergies, current medications, past family history, past medical history, past social history, past surgical history and problem list       Review of Systems   Constitutional: Positive for unexpected weight change ( gain)  Respiratory: Negative for shortness of breath  Cardiovascular: Negative for chest pain  Musculoskeletal: Positive for arthralgias and myalgias  Gait problem:   Due to arthritis  Neurological: Negative for light-headedness  Psychiatric/Behavioral: Sleep disturbance:  stable  Nervous/anxious:  stable  All other systems reviewed and are negative  Objective:      /78   Pulse 63   Temp (!) 96 8 °F (36 °C) (Temporal)   Resp 16   Ht 5' 3" (1 6 m)   Wt 98 kg (216 lb)   SpO2 99%   BMI 38 26 kg/m²          Physical Exam  Constitutional:       General: She is not in acute distress  Appearance: Normal appearance  She is well-developed  She is obese  HENT:      Head: Normocephalic  Mouth/Throat:      Mouth: Mucous membranes are moist    Eyes:      Pupils: Pupils are equal, round, and reactive to light  Neck:      Musculoskeletal: Normal range of motion and neck supple  Cardiovascular:      Rate and Rhythm: Normal rate and regular rhythm  Pulses: Normal pulses  Heart sounds: No murmur  Pulmonary:      Effort: Pulmonary effort is normal       Breath sounds: Normal breath sounds  Abdominal:      General: Bowel sounds are normal       Palpations: Abdomen is soft  There is no mass  Tenderness: There is no abdominal tenderness  Musculoskeletal: Normal range of motion  Right lower leg: Right lower leg edema:   Trace  Left lower leg: Left lower leg edema:  Trace  Skin:     General: Skin is warm and dry  Neurological:      Mental Status: She is alert and oriented to person, place, and time  Deep Tendon Reflexes: Reflexes are normal and symmetric  Psychiatric:         Mood and Affect: Mood normal          Behavior: Behavior normal          Thought Content:  Thought content normal

## 2021-03-18 DIAGNOSIS — K21.9 GASTROESOPHAGEAL REFLUX DISEASE WITHOUT ESOPHAGITIS: ICD-10-CM

## 2021-03-18 DIAGNOSIS — G47.01 INSOMNIA DUE TO MEDICAL CONDITION: ICD-10-CM

## 2021-03-18 RX ORDER — PANTOPRAZOLE SODIUM 40 MG/1
40 TABLET, DELAYED RELEASE ORAL DAILY PRN
Qty: 90 TABLET | Refills: 1 | Status: SHIPPED | OUTPATIENT
Start: 2021-03-18 | End: 2021-01-01

## 2021-03-18 RX ORDER — BUSPIRONE HYDROCHLORIDE 10 MG/1
10 TABLET ORAL 3 TIMES DAILY
Qty: 270 TABLET | Refills: 0 | Status: SHIPPED | OUTPATIENT
Start: 2021-03-18 | End: 2021-01-01 | Stop reason: SDUPTHER

## 2021-05-23 DIAGNOSIS — K21.9 GASTROESOPHAGEAL REFLUX DISEASE WITHOUT ESOPHAGITIS: ICD-10-CM

## 2021-05-23 RX ORDER — FAMOTIDINE 20 MG/1
TABLET, FILM COATED ORAL
Qty: 180 TABLET | Refills: 1 | Status: SHIPPED | OUTPATIENT
Start: 2021-05-23 | End: 2021-01-01 | Stop reason: SDUPTHER

## 2021-06-01 ENCOUNTER — APPOINTMENT (OUTPATIENT)
Dept: LAB | Facility: CLINIC | Age: 86
End: 2021-06-01
Payer: MEDICARE

## 2021-06-01 DIAGNOSIS — E78.00 PURE HYPERCHOLESTEROLEMIA: ICD-10-CM

## 2021-06-01 DIAGNOSIS — E53.8 VITAMIN B12 DEFICIENCY: ICD-10-CM

## 2021-06-01 DIAGNOSIS — E03.9 PRIMARY HYPOTHYROIDISM: ICD-10-CM

## 2021-06-01 DIAGNOSIS — E55.9 MILD VITAMIN D DEFICIENCY: ICD-10-CM

## 2021-06-01 DIAGNOSIS — I10 BENIGN ESSENTIAL HYPERTENSION: ICD-10-CM

## 2021-06-01 DIAGNOSIS — E79.0 HYPERURICEMIA: ICD-10-CM

## 2021-06-01 LAB
25(OH)D3 SERPL-MCNC: 37.8 NG/ML (ref 30–100)
ALBUMIN SERPL BCP-MCNC: 3.5 G/DL (ref 3.5–5)
ALP SERPL-CCNC: 149 U/L (ref 46–116)
ALT SERPL W P-5'-P-CCNC: 23 U/L (ref 12–78)
ANION GAP SERPL CALCULATED.3IONS-SCNC: 3 MMOL/L (ref 4–13)
AST SERPL W P-5'-P-CCNC: 16 U/L (ref 5–45)
BILIRUB SERPL-MCNC: 0.84 MG/DL (ref 0.2–1)
BUN SERPL-MCNC: 25 MG/DL (ref 5–25)
CALCIUM SERPL-MCNC: 9.7 MG/DL (ref 8.3–10.1)
CHLORIDE SERPL-SCNC: 103 MMOL/L (ref 100–108)
CHOLEST SERPL-MCNC: 248 MG/DL (ref 50–200)
CO2 SERPL-SCNC: 32 MMOL/L (ref 21–32)
CREAT SERPL-MCNC: 1.47 MG/DL (ref 0.6–1.3)
GFR SERPL CREATININE-BSD FRML MDRD: 31 ML/MIN/1.73SQ M
GLUCOSE P FAST SERPL-MCNC: 113 MG/DL (ref 65–99)
HDLC SERPL-MCNC: 51 MG/DL
LDLC SERPL CALC-MCNC: 163 MG/DL (ref 0–100)
POTASSIUM SERPL-SCNC: 4.3 MMOL/L (ref 3.5–5.3)
PROT SERPL-MCNC: 7.1 G/DL (ref 6.4–8.2)
SODIUM SERPL-SCNC: 138 MMOL/L (ref 136–145)
T3FREE SERPL-MCNC: 1.87 PG/ML (ref 2.3–4.2)
T4 FREE SERPL-MCNC: 1.37 NG/DL (ref 0.76–1.46)
TRIGL SERPL-MCNC: 170 MG/DL
TSH SERPL DL<=0.05 MIU/L-ACNC: 3.07 UIU/ML (ref 0.36–3.74)
VIT B12 SERPL-MCNC: 1630 PG/ML (ref 100–900)

## 2021-06-01 PROCEDURE — 80061 LIPID PANEL: CPT

## 2021-06-01 PROCEDURE — 36415 COLL VENOUS BLD VENIPUNCTURE: CPT

## 2021-06-01 PROCEDURE — 84439 ASSAY OF FREE THYROXINE: CPT

## 2021-06-01 PROCEDURE — 82607 VITAMIN B-12: CPT

## 2021-06-01 PROCEDURE — 80053 COMPREHEN METABOLIC PANEL: CPT

## 2021-06-01 PROCEDURE — 84443 ASSAY THYROID STIM HORMONE: CPT

## 2021-06-01 PROCEDURE — 82306 VITAMIN D 25 HYDROXY: CPT

## 2021-06-01 PROCEDURE — 84481 FREE ASSAY (FT-3): CPT

## 2021-06-08 ENCOUNTER — OFFICE VISIT (OUTPATIENT)
Dept: FAMILY MEDICINE CLINIC | Facility: CLINIC | Age: 86
End: 2021-06-08
Payer: MEDICARE

## 2021-06-08 DIAGNOSIS — M53.3 TRAUMATIC COCCYDYNIA: ICD-10-CM
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
## 2021-06-08 PROCEDURE — 99214 OFFICE O/P EST MOD 30 MIN: CPT | Performed by: FAMILY MEDICINE

## 2021-06-08 NOTE — PROGRESS NOTES
Assessment/Plan:     Diagnoses and all orders for this visit:    Fall, initial encounter    Traumatic coccydynia        Clinically no evidence of significant injury or signs of concussion with recent fall  Patient does have follow-up visit here prior to her going down to floor for several months in the fall  She is up-to-date otherwise  Subjective:   Chief Complaint   Patient presents with    Fall     Pt is here s/p fall on 6/6/2021 on her tailbone       Patient ID: Landen De La Cruz is a 80 y o  female  Patient is a 80-year-old female who is here with her daughter for follow-up on recent slip and fall home  Bathroom rug slipped with the cane, fell backward on her butt  Does think that she bumped her head  Has a little bit of soreness in the posterior scalp  No bleeding  No other injuries to complain of  In general patient continues to have expected aches and pains for 80-year-old female with osteoarthritis  Her son-in-law actually gave her some of his gabapentin and this was helpful bettina Guerra Also took ibuprofen and that was helpful  We discussed at length that gabapentin is generally okay to take muscle watch for sedation  Overall also the ibuprofen must be taking with food and only very judiciously  But at 80years old if both of these are helpful to improve her function and quality of life than as long as everyone is aware the risks I have no problem with doing them bettina Guerra The following portions of the patient's history were reviewed and updated as appropriate: allergies, current medications, past family history, past medical history, past social history, past surgical history and problem list       Review of Systems   Constitutional: Negative for appetite change  Respiratory: Negative for cough and shortness of breath  Cardiovascular: Negative for chest pain  Musculoskeletal: Positive for arthralgias and myalgias     Neurological: Negative for seizures, light-headedness and headaches  Recent fall  No change in mental status or neurologic symptoms  Objective:      /80   Pulse 57   Temp (!) 96 5 °F (35 8 °C) (Temporal)   Resp 16   Ht 5' 3" (1 6 m)   Wt 100 kg (221 lb 3 2 oz)   SpO2 97%   BMI 39 18 kg/m²          Physical Exam  Constitutional:       General: She is not in acute distress  Appearance: Normal appearance  She is well-developed  She is obese  Comments: 41-year-old female who appears younger than her stated age in no acute distress   HENT:      Head: Normocephalic  Comments: There is a small ecchymotic area at the top of the scalp slightly tender  Eyes:      Conjunctiva/sclera: Conjunctivae normal       Pupils: Pupils are equal, round, and reactive to light  Cardiovascular:      Rate and Rhythm: Normal rate and regular rhythm  Heart sounds: No murmur heard  Pulmonary:      Effort: Pulmonary effort is normal       Breath sounds: Normal breath sounds  Abdominal:      General: Bowel sounds are normal       Palpations: Abdomen is soft  There is no mass  Tenderness: There is no abdominal tenderness  Musculoskeletal:         General: Normal range of motion  Cervical back: Normal range of motion and neck supple  Comments: Patient walks steady with walker  Moving upper and lower extremities without difficulty grasp and strength normal   No ecchymosis noted on back or buttocks  Reflexes intact  Skin:     General: Skin is warm and dry  Neurological:      Mental Status: She is alert and oriented to person, place, and time  Deep Tendon Reflexes: Reflexes are normal and symmetric  Psychiatric:         Mood and Affect: Mood normal          Behavior: Behavior normal          Thought Content:  Thought content normal          Judgment: Judgment normal

## 2021-06-14 VITALS
OXYGEN SATURATION: 97 % | WEIGHT: 221.2 LBS | HEART RATE: 57 BPM | SYSTOLIC BLOOD PRESSURE: 132 MMHG | RESPIRATION RATE: 16 BRPM | HEIGHT: 63 IN | DIASTOLIC BLOOD PRESSURE: 80 MMHG | BODY MASS INDEX: 39.19 KG/M2 | TEMPERATURE: 96.5 F

## 2021-07-03 DIAGNOSIS — R60.0 EDEMA OF BOTH LOWER EXTREMITIES: ICD-10-CM

## 2021-07-05 RX ORDER — FUROSEMIDE 20 MG/1
TABLET ORAL
Qty: 90 TABLET | Refills: 1 | Status: SHIPPED | OUTPATIENT
Start: 2021-07-05

## 2021-07-30 NOTE — PROGRESS NOTES
Assessment/Plan:  Chief Complaint   Patient presents with    Follow-up     4 Month  Patient Instructions       Medicare Preventive Visit Patient Instructions  Thank you for completing your Welcome to Medicare Visit or Medicare Annual Wellness Visit today  Your next wellness visit will be due in one year (7/31/2022)  The screening/preventive services that you may require over the next 5-10 years are detailed below  Some tests may not apply to you based off risk factors and/or age  Screening tests ordered at today's visit but not completed yet may show as past due  Also, please note that scanned in results may not display below  Preventive Screenings:  Service Recommendations Previous Testing/Comments   Colorectal Cancer Screening  * Colonoscopy    * Fecal Occult Blood Test (FOBT)/Fecal Immunochemical Test (FIT)  * Fecal DNA/Cologuard Test  * Flexible Sigmoidoscopy Age: 54-65 years old   Colonoscopy: every 10 years (may be performed more frequently if at higher risk)  OR  FOBT/FIT: every 1 year  OR  Cologuard: every 3 years  OR  Sigmoidoscopy: every 5 years  Screening may be recommended earlier than age 48 if at higher risk for colorectal cancer  Also, an individualized decision between you and your healthcare provider will decide whether screening between the ages of 74-80 would be appropriate  Colonoscopy: Not on file  FOBT/FIT: Not on file  Cologuard: Not on file  Sigmoidoscopy: Not on file    Screening Not Indicated     Breast Cancer Screening Age: 36 years old  Frequency: every 1-2 years  Not required if history of left and right mastectomy Mammogram: 11/27/2017    History Breast Cancer   Cervical Cancer Screening Between the ages of 21-29, pap smear recommended once every 3 years  Between the ages of 33-67, can perform pap smear with HPV co-testing every 5 years     Recommendations may differ for women with a history of total hysterectomy, cervical cancer, or abnormal pap smears in past  Pap Smear: Not on file    Screening Not Indicated   Hepatitis C Screening Once for adults born between 1945 and 1965  More frequently in patients at high risk for Hepatitis C Hep C Antibody: Not on file        Diabetes Screening 1-2 times per year if you're at risk for diabetes or have pre-diabetes Fasting glucose: 113 mg/dL   A1C: 5 8    Screening Current   Cholesterol Screening Once every 5 years if you don't have a lipid disorder  May order more often based on risk factors  Lipid panel: 06/01/2021    Screening Not Indicated  History Lipid Disorder     Other Preventive Screenings Covered by Medicare:  1  Abdominal Aortic Aneurysm (AAA) Screening: covered once if your at risk  You're considered to be at risk if you have a family history of AAA  2  Lung Cancer Screening: covers low dose CT scan once per year if you meet all of the following conditions: (1) Age 50-69; (2) No signs or symptoms of lung cancer; (3) Current smoker or have quit smoking within the last 15 years; (4) You have a tobacco smoking history of at least 30 pack years (packs per day multiplied by number of years you smoked); (5) You get a written order from a healthcare provider  3  Glaucoma Screening: covered annually if you're considered high risk: (1) You have diabetes OR (2) Family history of glaucoma OR (3)  aged 48 and older OR (3)  American aged 72 and older  3  Osteoporosis Screening: covered every 2 years if you meet one of the following conditions: (1) You're estrogen deficient and at risk for osteoporosis based off medical history and other findings; (2) Have a vertebral abnormality; (3) On glucocorticoid therapy for more than 3 months; (4) Have primary hyperparathyroidism; (5) On osteoporosis medications and need to assess response to drug therapy  · Last bone density test (DXA Scan): 10/14/2014   5  HIV Screening: covered annually if you're between the age of 15-65   Also covered annually if you are younger than 13 and older than 72 with risk factors for HIV infection  For pregnant patients, it is covered up to 3 times per pregnancy  Immunizations:  Immunization Recommendations   Influenza Vaccine Annual influenza vaccination during flu season is recommended for all persons aged >= 6 months who do not have contraindications   Pneumococcal Vaccine (Prevnar and Pneumovax)  * Prevnar = PCV13  * Pneumovax = PPSV23   Adults 25-60 years old: 1-3 doses may be recommended based on certain risk factors  Adults 72 years old: Prevnar (PCV13) vaccine recommended followed by Pneumovax (PPSV23) vaccine  If already received PPSV23 since turning 65, then PCV13 recommended at least one year after PPSV23 dose  Hepatitis B Vaccine 3 dose series if at intermediate or high risk (ex: diabetes, end stage renal disease, liver disease)   Tetanus (Td) Vaccine - COST NOT COVERED BY MEDICARE PART B Following completion of primary series, a booster dose should be given every 10 years to maintain immunity against tetanus  Td may also be given as tetanus wound prophylaxis  Tdap Vaccine - COST NOT COVERED BY MEDICARE PART B Recommended at least once for all adults  For pregnant patients, recommended with each pregnancy  Shingles Vaccine (Shingrix) - COST NOT COVERED BY MEDICARE PART B  2 shot series recommended in those aged 48 and above     Health Maintenance Due:  There are no preventive care reminders to display for this patient  Immunizations Due:      Topic Date Due    COVID-19 Vaccine (1) Never done    DTaP,Tdap,and Td Vaccines (1 - Tdap) Never done    Influenza Vaccine (1) 09/01/2021     Advance Directives   What are advance directives? Advance directives are legal documents that state your wishes and plans for medical care  These plans are made ahead of time in case you lose your ability to make decisions for yourself  Advance directives can apply to any medical decision, such as the treatments you want, and if you want to donate organs  What are the types of advance directives? There are many types of advance directives, and each state has rules about how to use them  You may choose a combination of any of the following:  · Living will: This is a written record of the treatment you want  You can also choose which treatments you do not want, which to limit, and which to stop at a certain time  This includes surgery, medicine, IV fluid, and tube feedings  · Durable power of  for healthcare [facility]): This is a written record that states who you want to make healthcare choices for you when you are unable to make them for yourself  This person, called a proxy, is usually a family member or a friend  You may choose more than 1 proxy  · Do not resuscitate (DNR) order:  A DNR order is used in case your heart stops beating or you stop breathing  It is a request not to have certain forms of treatment, such as CPR  A DNR order may be included in other types of advance directives  · Medical directive: This covers the care that you want if you are in a coma, near death, or unable to make decisions for yourself  You can list the treatments you want for each condition  Treatment may include pain medicine, surgery, blood transfusions, dialysis, IV or tube feedings, and a ventilator (breathing machine)  · Values history: This document has questions about your views, beliefs, and how you feel and think about life  This information can help others choose the care that you would choose  Why are advance directives important? An advance directive helps you control your care  Although spoken wishes may be used, it is better to have your wishes written down  Spoken wishes can be misunderstood, or not followed  Treatments may be given even if you do not want them  An advance directive may make it easier for your family to make difficult choices about your care  Fall Prevention    Fall prevention  includes ways to make your home and other areas safer  It also includes ways you can move more carefully to prevent a fall  Health conditions that cause changes in your blood pressure, vision, or muscle strength and coordination may increase your risk for falls  Medicines may also increase your risk for falls if they make you dizzy, weak, or sleepy  Fall prevention tips:   · Stand or sit up slowly  · Use assistive devices as directed  · Wear shoes that fit well and have soles that   · Wear a personal alarm  · Stay active  · Manage your medical conditions  Home Safety Tips:  · Add items to prevent falls in the bathroom  · Keep paths clear  · Install bright lights in your home  · Keep items you use often on shelves within reach  · Paint or place reflective tape on the edges of your stairs  Urinary Incontinence   Urinary incontinence (UI)  is when you lose control of your bladder  UI develops because your bladder cannot store or empty urine properly  The 3 most common types of UI are stress incontinence, urge incontinence, or both  Medicines:   · May be given to help strengthen your bladder control  Report any side effects of medication to your healthcare provider  Do pelvic muscle exercises often:  Your pelvic muscles help you stop urinating  Squeeze these muscles tight for 5 seconds, then relax for 5 seconds  Gradually work up to squeezing for 10 seconds  Do 3 sets of 15 repetitions a day, or as directed  This will help strengthen your pelvic muscles and improve bladder control  Train your bladder:  Go to the bathroom at set times, such as every 2 hours, even if you do not feel the urge to go  You can also try to hold your urine when you feel the urge to go  For example, hold your urine for 5 minutes when you feel the urge to go  As that becomes easier, hold your urine for 10 minutes  Self-care:   · Keep a UI record  Write down how often you leak urine and how much you leak   Make a note of what you were doing when you leaked urine   · Drink liquids as directed  You may need to limit the amount of liquid you drink to help control your urine leakage  Do not drink any liquid right before you go to bed  Limit or do not have drinks that contain caffeine or alcohol  · Prevent constipation  Eat a variety of high-fiber foods  Good examples are high-fiber cereals, beans, vegetables, and whole-grain breads  Walking is the best way to trigger your intestines to have a bowel movement  · Exercise regularly and maintain a healthy weight  Weight loss and exercise will decrease pressure on your bladder and help you control your leakage  · Use a catheter as directed  to help empty your bladder  A catheter is a tiny, plastic tube that is put into your bladder to drain your urine  · Go to behavior therapy as directed  Behavior therapy may be used to help you learn to control your urge to urinate  Weight Management   Why it is important to manage your weight:  Being overweight increases your risk of health conditions such as heart disease, high blood pressure, type 2 diabetes, and certain types of cancer  It can also increase your risk for osteoarthritis, sleep apnea, and other respiratory problems  Aim for a slow, steady weight loss  Even a small amount of weight loss can lower your risk of health problems  How to lose weight safely:  A safe and healthy way to lose weight is to eat fewer calories and get regular exercise  You can lose up about 1 pound a week by decreasing the number of calories you eat by 500 calories each day  Healthy meal plan for weight management:  A healthy meal plan includes a variety of foods, contains fewer calories, and helps you stay healthy  A healthy meal plan includes the following:  · Eat whole-grain foods more often  A healthy meal plan should contain fiber  Fiber is the part of grains, fruits, and vegetables that is not broken down by your body   Whole-grain foods are healthy and provide extra fiber in your diet  Some examples of whole-grain foods are whole-wheat breads and pastas, oatmeal, brown rice, and bulgur  · Eat a variety of vegetables every day  Include dark, leafy greens such as spinach, kale, mery greens, and mustard greens  Eat yellow and orange vegetables such as carrots, sweet potatoes, and winter squash  · Eat a variety of fruits every day  Choose fresh or canned fruit (canned in its own juice or light syrup) instead of juice  Fruit juice has very little or no fiber  · Eat low-fat dairy foods  Drink fat-free (skim) milk or 1% milk  Eat fat-free yogurt and low-fat cottage cheese  Try low-fat cheeses such as mozzarella and other reduced-fat cheeses  · Choose meat and other protein foods that are low in fat  Choose beans or other legumes such as split peas or lentils  Choose fish, skinless poultry (chicken or turkey), or lean cuts of red meat (beef or pork)  Before you cook meat or poultry, cut off any visible fat  · Use less fat and oil  Try baking foods instead of frying them  Add less fat, such as margarine, sour cream, regular salad dressing and mayonnaise to foods  Eat fewer high-fat foods  Some examples of high-fat foods include french fries, doughnuts, ice cream, and cakes  · Eat fewer sweets  Limit foods and drinks that are high in sugar  This includes candy, cookies, regular soda, and sweetened drinks  Exercise:  Exercise at least 30 minutes per day on most days of the week  Some examples of exercise include walking, biking, dancing, and swimming  You can also fit in more physical activity by taking the stairs instead of the elevator or parking farther away from stores  Ask your healthcare provider about the best exercise plan for you  © Copyright [employer] 2018 Information is for End User's use only and may not be sold, redistributed or otherwise used for commercial purposes   All illustrations and images included in CareNotes® are the copyrighted property of [employer]  or [address] well today, rec COVID 19 and flu shot this year, travelling to [place] for the winter starting end of August 2021  Take all meds as directed for HTN - stable today and gerd and meds for anxiety and depression as well as hypothyroidism  Patient will consider PCP down in [place] when there  Stay well hydrated and monitor renal function  Avoid NSAIDs  Renal function stable recently  Continue low cholesterol diet for hx of hyperlipidemia and get labs as directed for hypothyroidism  No problem-specific Assessment & Plan notes found for this encounter  Diagnoses and all orders for this visit:    Benign essential hypertension    Gastroesophageal reflux disease without esophagitis    Primary hypothyroidism    3-vessel coronary artery disease    Generalized anxiety disorder    Pure hypercholesterolemia    Other depression    Mild vitamin D deficiency    Stage 3 chronic kidney disease, unspecified whether stage 3a or 3b CKD (HCC)          Subjective:      Patient ID: Britney Adams is a 80 y o  female  Follow-up (4 Month  ) patient is here for 4 month visit and medicare wellness  Has a living will also  Home is safe and also daughter helps her in the home  No cp or sob, or ha  The following portions of the patient's history were reviewed and updated as appropriate: allergies, current medications, past family history, past medical history, past social history, past surgical history and problem list     Review of Systems   Constitutional: Negative  HENT: Negative  Eyes: Negative  Respiratory: Negative  Cardiovascular: Negative  Gastrointestinal: Negative  Gerd stable   Endocrine: Negative  Genitourinary: Negative  Musculoskeletal: Negative  Skin: Negative  Allergic/Immunologic: Negative  Neurological: Negative  Hematological: Negative  Psychiatric/Behavioral: Negative            Objective:      /90 (BP Location: Left arm, Patient Position: Sitting, Cuff Size: Adult)   Pulse 61   Temp (!) 96 6 °F (35 9 °C) (Temporal)   Resp 14   Ht 5' 3" (1 6 m)   Wt 99 1 kg (218 lb 6 4 oz)   SpO2 98%   BMI 38 69 kg/m²          Physical Exam  Constitutional:       Appearance: She is well-developed  She is obese  HENT:      Head: Normocephalic and atraumatic  Right Ear: External ear normal       Left Ear: External ear normal       Nose: Nose normal    Eyes:      Conjunctiva/sclera: Conjunctivae normal       Pupils: Pupils are equal, round, and reactive to light  Cardiovascular:      Rate and Rhythm: Normal rate and regular rhythm  Heart sounds: Normal heart sounds  Pulmonary:      Effort: Pulmonary effort is normal       Breath sounds: Normal breath sounds  Musculoskeletal:         General: Normal range of motion  Cervical back: Normal range of motion and neck supple  Comments: Quad walker    Skin:     General: Skin is warm and dry  Neurological:      Mental Status: She is alert and oriented to person, place, and time  Deep Tendon Reflexes: Reflexes are normal and symmetric     Psychiatric:         Behavior: Behavior normal

## 2021-07-30 NOTE — PROGRESS NOTES
Assessment and Plan:     Problem List Items Addressed This Visit     None           Preventive health issues were discussed with patient, and age appropriate screening tests were ordered as noted in patient's After Visit Summary  Personalized health advice and appropriate referrals for health education or preventive services given if needed, as noted in patient's After Visit Summary  History of Present Illness:     Patient presents for Welcome to Medicare visit  Patient Care Team:  Bill Connelly DO as PCP - General  DO Patricia Jaramillo MD Bluford Hedges, MD Alyne Sickle, MD Refugia Heir, DO     Review of Systems:     Review of Systems   Problem List:     Patient Active Problem List   Diagnosis    Benign essential hypertension    3-vessel coronary artery disease    Depression    Esophageal reflux    Gait disturbance    Generalized anxiety disorder    Generalized osteoarthritis    Hyperlipidemia    Hyperuricemia    Insomnia    Macular degeneration    Mild vitamin D deficiency    Mixed stress and urge urinary incontinence    Obesity    Other chronic pain    PAD (peripheral artery disease) ([address]    Peripheral neuropathy    Primary hypothyroidism    Primary localized osteoarthritis of both knees    Spinal stenosis    History of right breast cancer    Encounter for follow-up examination after completed treatment for malignant neoplasm    Visual hallucination      Past Medical and Surgical History:     Past Medical History:   Diagnosis Date    Arthritis     Malignant neoplasm of breast ([address] )     Resolved:  Oct 26, 2015    Sciatica     last assessed: July 2, 2013     Past Surgical History:   Procedure Laterality Date    BREAST BIOPSY      BREAST LUMPECTOMY      CARDIAC SURGERY      CATARACT EXTRACTION      CORONARY ARTERY BYPASS GRAFT  2009    ROTATOR CUFF REPAIR      TONSILLECTOMY      TOTAL ABDOMINAL HYSTERECTOMY        Family History:     Family History Problem Relation Age of Onset    Other Mother         Maternal coagulation defect complicating pregnancy/childbirth/puerperium     Emphysema Father     Other Sister         Pacemaker Placement     Coronary artery disease Brother     Melanoma Daughter       Social History:     Social History     Socioeconomic History    Marital status:      Spouse name: None    Number of children: None    Years of education: None    Highest education level: None   Occupational History    None   Tobacco Use    Smoking status: Former Smoker     Types: Cigarettes     Quit date:      Years since quittin 5    Smokeless tobacco: Never Used   Substance and Sexual Activity    Alcohol use: Yes     Comment: social     Drug use: No    Sexual activity: None   Other Topics Concern    None   Social History Narrative    Lives with adult children      Social Determinants of Health     Financial Resource Strain:     Difficulty of Paying Living Expenses:    Food Insecurity:     Worried About Running Out of Food in the Last Year:     [address] in the Last Year:    Transportation Needs:     Lack of Transportation (Medical):      Lack of Transportation (Non-Medical):    Physical Activity:     Days of Exercise per Week:     Minutes of Exercise per Session:    Stress:     Feeling of Stress :    Social Connections:     Frequency of Communication with Friends and Family:     Frequency of Social Gatherings with Friends and Family:     Attends [employer] Services:     Active Member of Clubs or Organizations:     Attends Club or Organization Meetings:     Marital Status:    Intimate Partner Violence:     Fear of Current or Ex-Partner:     Emotionally Abused:     Physically Abused:     Sexually Abused:       Medications and Allergies:     Current Outpatient Medications   Medication Sig Dispense Refill    allopurinol (ZYLOPRIM) 100 mg tablet TAKE 1 TABLET(100 MG) BY MOUTH DAILY 90 tablet 3    aspirin 81 mg chewable tablet Chew 1 tablet daily      busPIRone (BUSPAR) 10 mg tablet Take 1 tablet (10 mg total) by mouth 3 (three) times a day 270 tablet 0    cetirizine (ZyrTEC) 10 mg tablet Take 10 mg by mouth daily      Cholecalciferol (D3-1000) 1000 units tablet Take 1,000 Units by mouth daily      Coenzyme Q10 (CO Q 10) 100 MG CAPS Take by mouth      cyanocobalamin (VITAMIN B-12) 1,000 mcg tablet Take by mouth      famotidine (PEPCID) 20 mg tablet TAKE 1 TABLET(20 MG) BY MOUTH TWICE DAILY 180 tablet 1    furosemide (LASIX) 20 mg tablet TAKE 1 TABLET(20 MG) BY MOUTH DAILY AS NEEDED FOR SWELLING 90 tablet 1    levothyroxine 50 mcg tablet TAKE 1 TABLET(50 MCG) BY MOUTH DAILY 90 tablet 3    lisinopril (ZESTRIL) 40 mg tablet Take 0 5 tablets (20 mg total) by mouth daily 90 tablet 1    LORazepam (ATIVAN) 0 5 mg tablet TAKE 1 TABLET BY MOUTH TWICE DAILY 180 tablet 0    Magnesium 100 MG TABS Take by mouth      metoprolol succinate (TOPROL-XL) 200 MG 24 hr tablet TAKE 1/2 TABLET BY MOUTH THREE TIMES DAILY 135 tablet 3    Multiple Vitamins-Minerals (PRESERVISION AREDS) CAPS Take by mouth      pantoprazole (PROTONIX) 40 mg tablet Take 1 tablet (40 mg total) by mouth daily as needed (reflux) 90 tablet 1     No current facility-administered medications for this visit  Allergies   Allergen Reactions    Meperidine Nausea Only    Tramadol Nausea Only      Immunizations:     Immunization History   Administered Date(s) Administered    INFLUENZA 12/03/2004    Influenza Split High Dose Preservative Free IM 10/24/2012, 10/09/2013, 09/17/2014, 10/09/2015, 11/17/2016, 11/28/2017    Influenza, high dose seasonal 0 7 mL 11/16/2018, 10/28/2020    Pneumococcal Conjugate 13-Valent 01/17/2011, 11/28/2017    Pneumococcal Polysaccharide PPV23 09/17/2011    Zoster 02/05/2013      Health Maintenance: There are no preventive care reminders to display for this patient        Topic Date Due    COVID-19 Vaccine (1) Never done  DTaP,Tdap,and Td Vaccines (1 - Tdap) Never done    Influenza Vaccine (1) 09/01/2021      Medicare Screening Tests and Risk Assessments:         Health Risk Assessment:   Patient rates overall health as good  Patient feels that their physical health rating is same  Patient is satisfied with their life  Eyesight was rated as slightly worse  Hearing was rated as same  Patient feels that their emotional and mental health rating is same  Patients states they are never, rarely angry  Patient states they are sometimes unusually tired/fatigued  Pain experienced in the last 7 days has been some  Patient's pain rating has been 3/10  Patient states that she has experienced no weight loss or gain in last 6 months  Fall Risk Screening: In the past year, patient has experienced: history of falling in past year    Number of falls: 1  Injured during fall?: Yes    Feels unsteady when standing or walking?: Yes    Worried about falling?: Yes      Urinary Incontinence Screening:   Patient has leaked urine accidently in the last six months  Home Safety:  Patient does not have trouble with stairs inside or outside of their home  Patient has working smoke alarms and has working carbon monoxide detector  Home safety hazards include: none  Pt has no stairs at home    Medications:   Patient is currently taking over-the-counter supplements  OTC medications include: see medication list  Patient is able to manage medications  Pt's daughter manages medications  Activities of Daily Living (ADLs)/Instrumental Activities of Daily Living (IADLs):   Walk and transfer into and out of bed and chair?: Yes  Dress and groom yourself?: Yes    Bathe or shower yourself?: No    Feed yourself? Yes  Do your laundry/housekeeping?: No  Manage your money, pay your bills and track your expenses?: No  Make your own meals?: No    Do your own shopping?: No    ADL comments: Pt uses walker to move around  Pt's daughter assist with all the above  Daughter helps and lives with patient, will be going to [place] this August      Previous Hospitalizations:   Any hospitalizations or ED visits within the last 12 months?: No      Advance Care Planning:   Living will: Yes    Durable POA for healthcare: Yes    Advanced directive: Yes      PREVENTIVE SCREENINGS      Cardiovascular Screening:    General: Screening Not Indicated and History Lipid Disorder      Diabetes Screening:     General: Screening Current      Colorectal Cancer Screening:     General: Screening Not Indicated      Breast Cancer Screening:     General: History Breast Cancer      Cervical Cancer Screening:    General: Screening Not Indicated      Lung Cancer Screening:     General: Screening Not Indicated    Screening, Brief Intervention, and Referral to Treatment (SBIRT)    Screening      Single Item Drug Screening:  How often have you used an illegal drug (including marijuana) or a prescription medication for non-medical reasons in the past year? never    Single Item Drug Screen Score: 0  Interpretation: Negative screen for possible drug use disorder    No exam data present     Physical Exam:     /90 (BP Location: Left arm, Patient Position: Sitting, Cuff Size: Adult)   Pulse 61   Temp (!) 96 6 °F (35 9 °C) (Temporal)   Resp 14   Ht 5' 3" (1 6 m)   Wt 99 1 kg (218 lb 6 4 oz)   SpO2 98%   BMI 38 69 kg/m²     Physical Exam     Perry Plata DO BMI Counseling: Body mass index is 38 69 kg/m²  The BMI is above normal  Nutrition recommendations include reducing portion sizes, decreasing overall calorie intake, 3-5 servings of fruits/vegetables daily, reducing fast food intake, consuming healthier snacks, decreasing soda and/or juice intake, moderation in carbohydrate intake, increasing intake of lean protein, reducing intake of saturated fat and trans fat and reducing intake of cholesterol  Exercise recommendations include exercising 3-5 times per week

## 2021-07-30 NOTE — PATIENT INSTRUCTIONS
Medicare Preventive Visit Patient Instructions  Thank you for completing your Welcome to Medicare Visit or Medicare Annual Wellness Visit today  Your next wellness visit will be due in one year (7/31/2022)  The screening/preventive services that you may require over the next 5-10 years are detailed below  Some tests may not apply to you based off risk factors and/or age  Screening tests ordered at today's visit but not completed yet may show as past due  Also, please note that scanned in results may not display below  Preventive Screenings:  Service Recommendations Previous Testing/Comments   Colorectal Cancer Screening  * Colonoscopy    * Fecal Occult Blood Test (FOBT)/Fecal Immunochemical Test (FIT)  * Fecal DNA/Cologuard Test  * Flexible Sigmoidoscopy Age: 54-65 years old   Colonoscopy: every 10 years (may be performed more frequently if at higher risk)  OR  FOBT/FIT: every 1 year  OR  Cologuard: every 3 years  OR  Sigmoidoscopy: every 5 years  Screening may be recommended earlier than age 48 if at higher risk for colorectal cancer  Also, an individualized decision between you and your healthcare provider will decide whether screening between the ages of 74-80 would be appropriate  Colonoscopy: Not on file  FOBT/FIT: Not on file  Cologuard: Not on file  Sigmoidoscopy: Not on file    Screening Not Indicated     Breast Cancer Screening Age: 36 years old  Frequency: every 1-2 years  Not required if history of left and right mastectomy Mammogram: 11/27/2017    History Breast Cancer   Cervical Cancer Screening Between the ages of 21-29, pap smear recommended once every 3 years  Between the ages of 33-67, can perform pap smear with HPV co-testing every 5 years     Recommendations may differ for women with a history of total hysterectomy, cervical cancer, or abnormal pap smears in past  Pap Smear: Not on file    Screening Not Indicated   Hepatitis C Screening Once for adults born between [facility] frequently in patients at high risk for Hepatitis C Hep C Antibody: Not on file        Diabetes Screening 1-2 times per year if you're at risk for diabetes or have pre-diabetes Fasting glucose: 113 mg/dL   A1C: 5 8    Screening Current   Cholesterol Screening Once every 5 years if you don't have a lipid disorder  May order more often based on risk factors  Lipid panel: 06/01/2021    Screening Not Indicated  History Lipid Disorder     Other Preventive Screenings Covered by Medicare:  1  Abdominal Aortic Aneurysm (AAA) Screening: covered once if your at risk  You're considered to be at risk if you have a family history of AAA  2  Lung Cancer Screening: covers low dose CT scan once per year if you meet all of the following conditions: (1) Age 50-69; (2) No signs or symptoms of lung cancer; (3) Current smoker or have quit smoking within the last 15 years; (4) You have a tobacco smoking history of at least 30 pack years (packs per day multiplied by number of years you smoked); (5) You get a written order from a healthcare provider  3  Glaucoma Screening: covered annually if you're considered high risk: (1) You have diabetes OR (2) Family history of glaucoma OR (3)  aged 48 and older OR (3)  American aged 72 and older  3  Osteoporosis Screening: covered every 2 years if you meet one of the following conditions: (1) You're estrogen deficient and at risk for osteoporosis based off medical history and other findings; (2) Have a vertebral abnormality; (3) On glucocorticoid therapy for more than 3 months; (4) Have primary hyperparathyroidism; (5) On osteoporosis medications and need to assess response to drug therapy  · Last bone density test (DXA Scan): 10/14/2014   5  HIV Screening: covered annually if you're between the age of 15-65  Also covered annually if you are younger than 13 and older than 72 with risk factors for HIV infection   For pregnant patients, it is covered up to 3 times per pregnancy  Immunizations:  Immunization Recommendations   Influenza Vaccine Annual influenza vaccination during flu season is recommended for all persons aged >= 6 months who do not have contraindications   Pneumococcal Vaccine (Prevnar and Pneumovax)  * Prevnar = PCV13  * Pneumovax = PPSV23   Adults 25-60 years old: 1-3 doses may be recommended based on certain risk factors  Adults 72 years old: Prevnar (PCV13) vaccine recommended followed by Pneumovax (PPSV23) vaccine  If already received PPSV23 since turning 65, then PCV13 recommended at least one year after PPSV23 dose  Hepatitis B Vaccine 3 dose series if at intermediate or high risk (ex: diabetes, end stage renal disease, liver disease)   Tetanus (Td) Vaccine - COST NOT COVERED BY MEDICARE PART B Following completion of primary series, a booster dose should be given every 10 years to maintain immunity against tetanus  Td may also be given as tetanus wound prophylaxis  Tdap Vaccine - COST NOT COVERED BY MEDICARE PART B Recommended at least once for all adults  For pregnant patients, recommended with each pregnancy  Shingles Vaccine (Shingrix) - COST NOT COVERED BY MEDICARE PART B  2 shot series recommended in those aged 48 and above     Health Maintenance Due:  There are no preventive care reminders to display for this patient  Immunizations Due:      Topic Date Due    COVID-19 Vaccine (1) Never done    DTaP,Tdap,and Td Vaccines (1 - Tdap) Never done    Influenza Vaccine (1) 09/01/2021     Advance Directives   What are advance directives? Advance directives are legal documents that state your wishes and plans for medical care  These plans are made ahead of time in case you lose your ability to make decisions for yourself  Advance directives can apply to any medical decision, such as the treatments you want, and if you want to donate organs  What are the types of advance directives?   There are many types of advance directives, and each state has rules about how to use them  You may choose a combination of any of the following:  · Living will: This is a written record of the treatment you want  You can also choose which treatments you do not want, which to limit, and which to stop at a certain time  This includes surgery, medicine, IV fluid, and tube feedings  · Durable power of  for healthcare [facility]): This is a written record that states who you want to make healthcare choices for you when you are unable to make them for yourself  This person, called a proxy, is usually a family member or a friend  You may choose more than 1 proxy  · Do not resuscitate (DNR) order:  A DNR order is used in case your heart stops beating or you stop breathing  It is a request not to have certain forms of treatment, such as CPR  A DNR order may be included in other types of advance directives  · Medical directive: This covers the care that you want if you are in a coma, near death, or unable to make decisions for yourself  You can list the treatments you want for each condition  Treatment may include pain medicine, surgery, blood transfusions, dialysis, IV or tube feedings, and a ventilator (breathing machine)  · Values history: This document has questions about your views, beliefs, and how you feel and think about life  This information can help others choose the care that you would choose  Why are advance directives important? An advance directive helps you control your care  Although spoken wishes may be used, it is better to have your wishes written down  Spoken wishes can be misunderstood, or not followed  Treatments may be given even if you do not want them  An advance directive may make it easier for your family to make difficult choices about your care  Fall Prevention    Fall prevention  includes ways to make your home and other areas safer  It also includes ways you can move more carefully to prevent a fall   Health conditions that cause changes in your blood pressure, vision, or muscle strength and coordination may increase your risk for falls  Medicines may also increase your risk for falls if they make you dizzy, weak, or sleepy  Fall prevention tips:   · Stand or sit up slowly  · Use assistive devices as directed  · Wear shoes that fit well and have soles that   · Wear a personal alarm  · Stay active  · Manage your medical conditions  Home Safety Tips:  · Add items to prevent falls in the bathroom  · Keep paths clear  · Install bright lights in your home  · Keep items you use often on shelves within reach  · Paint or place reflective tape on the edges of your stairs  Urinary Incontinence   Urinary incontinence (UI)  is when you lose control of your bladder  UI develops because your bladder cannot store or empty urine properly  The 3 most common types of UI are stress incontinence, urge incontinence, or both  Medicines:   · May be given to help strengthen your bladder control  Report any side effects of medication to your healthcare provider  Do pelvic muscle exercises often:  Your pelvic muscles help you stop urinating  Squeeze these muscles tight for 5 seconds, then relax for 5 seconds  Gradually work up to squeezing for 10 seconds  Do 3 sets of 15 repetitions a day, or as directed  This will help strengthen your pelvic muscles and improve bladder control  Train your bladder:  Go to the bathroom at set times, such as every 2 hours, even if you do not feel the urge to go  You can also try to hold your urine when you feel the urge to go  For example, hold your urine for 5 minutes when you feel the urge to go  As that becomes easier, hold your urine for 10 minutes  Self-care:   · Keep a UI record  Write down how often you leak urine and how much you leak  Make a note of what you were doing when you leaked urine  · Drink liquids as directed   You may need to limit the amount of liquid you drink to help control your urine leakage  Do not drink any liquid right before you go to bed  Limit or do not have drinks that contain caffeine or alcohol  · Prevent constipation  Eat a variety of high-fiber foods  Good examples are high-fiber cereals, beans, vegetables, and whole-grain breads  Walking is the best way to trigger your intestines to have a bowel movement  · Exercise regularly and maintain a healthy weight  Weight loss and exercise will decrease pressure on your bladder and help you control your leakage  · Use a catheter as directed  to help empty your bladder  A catheter is a tiny, plastic tube that is put into your bladder to drain your urine  · Go to behavior therapy as directed  Behavior therapy may be used to help you learn to control your urge to urinate  Weight Management   Why it is important to manage your weight:  Being overweight increases your risk of health conditions such as heart disease, high blood pressure, type 2 diabetes, and certain types of cancer  It can also increase your risk for osteoarthritis, sleep apnea, and other respiratory problems  Aim for a slow, steady weight loss  Even a small amount of weight loss can lower your risk of health problems  How to lose weight safely:  A safe and healthy way to lose weight is to eat fewer calories and get regular exercise  You can lose up about 1 pound a week by decreasing the number of calories you eat by 500 calories each day  Healthy meal plan for weight management:  A healthy meal plan includes a variety of foods, contains fewer calories, and helps you stay healthy  A healthy meal plan includes the following:  · Eat whole-grain foods more often  A healthy meal plan should contain fiber  Fiber is the part of grains, fruits, and vegetables that is not broken down by your body  Whole-grain foods are healthy and provide extra fiber in your diet   Some examples of whole-grain foods are whole-wheat breads and pastas, oatmeal, brown rice, and bulgur  · Eat a variety of vegetables every day  Include dark, leafy greens such as spinach, kale, mery greens, and mustard greens  Eat yellow and orange vegetables such as carrots, sweet potatoes, and winter squash  · Eat a variety of fruits every day  Choose fresh or canned fruit (canned in its own juice or light syrup) instead of juice  Fruit juice has very little or no fiber  · Eat low-fat dairy foods  Drink fat-free (skim) milk or 1% milk  Eat fat-free yogurt and low-fat cottage cheese  Try low-fat cheeses such as mozzarella and other reduced-fat cheeses  · Choose meat and other protein foods that are low in fat  Choose beans or other legumes such as split peas or lentils  Choose fish, skinless poultry (chicken or turkey), or lean cuts of red meat (beef or pork)  Before you cook meat or poultry, cut off any visible fat  · Use less fat and oil  Try baking foods instead of frying them  Add less fat, such as margarine, sour cream, regular salad dressing and mayonnaise to foods  Eat fewer high-fat foods  Some examples of high-fat foods include french fries, doughnuts, ice cream, and cakes  · Eat fewer sweets  Limit foods and drinks that are high in sugar  This includes candy, cookies, regular soda, and sweetened drinks  Exercise:  Exercise at least 30 minutes per day on most days of the week  Some examples of exercise include walking, biking, dancing, and swimming  You can also fit in more physical activity by taking the stairs instead of the elevator or parking farther away from stores  Ask your healthcare provider about the best exercise plan for you  © Copyright [employer] 2018 Information is for End User's use only and may not be sold, redistributed or otherwise used for commercial purposes   All illustrations and images included in CareNotes® are the copyrighted property of [employer]  or [address] well today, rec COVID 19 and flu shot this year, travelling to [place] for the winter starting end of August 2021  Take all meds as directed for HTN - stable today and gerd and meds for anxiety and depression as well as hypothyroidism  Patient will consider PCP down in [place] when there  Stay well hydrated and monitor renal function  Avoid NSAIDs  Renal function stable recently  Continue low cholesterol diet for hx of hyperlipidemia and get labs as directed for hypothyroidism

## 2021-09-13 NOTE — TELEPHONE ENCOUNTER
Pt's daughter called the office her name is Essence Cornelius  She called to let us know that her mother Veronica Dubois the pt is now back down in [place]  Pt will be using the [facility] in [place] on [address] for rx refills  I asked if pt is yet due for a refill not due  til 2 weeks for her pantoprazole  They will call as it gets closer for refill ibuprofen advised this is documented, but each time please clarify it is the [facility] in [place]

## 2021-09-15 NOTE — TELEPHONE ENCOUNTER
Requested medication(s) are due for refill today: Yes  Patient has already received a courtesy refill: No  Other reason request has been forwarded to provider: prescription went to the wrong pharmacy went to the [facility] on [address]  Needs to go to the [facility]

## 2022-01-01 ENCOUNTER — APPOINTMENT (INPATIENT)
Dept: NON INVASIVE DIAGNOSTICS | Facility: HOSPITAL | Age: 87
DRG: 064 | End: 2022-01-01
Payer: MEDICARE

## 2022-01-01 ENCOUNTER — NURSING HOME VISIT (OUTPATIENT)
Dept: FAMILY MEDICINE CLINIC | Facility: CLINIC | Age: 87
End: 2022-01-01
Payer: MEDICARE

## 2022-01-01 ENCOUNTER — APPOINTMENT (EMERGENCY)
Dept: RADIOLOGY | Facility: HOSPITAL | Age: 87
DRG: 064 | End: 2022-01-01
Payer: MEDICARE

## 2022-01-01 ENCOUNTER — TELEPHONE (OUTPATIENT)
Dept: FAMILY MEDICINE CLINIC | Facility: CLINIC | Age: 87
End: 2022-01-01

## 2022-01-01 ENCOUNTER — TELEPHONE (OUTPATIENT)
Dept: NEUROSURGERY | Facility: CLINIC | Age: 87
End: 2022-01-01

## 2022-01-01 ENCOUNTER — APPOINTMENT (INPATIENT)
Dept: RADIOLOGY | Facility: HOSPITAL | Age: 87
DRG: 064 | End: 2022-01-01
Payer: MEDICARE

## 2022-01-01 ENCOUNTER — PATIENT OUTREACH (OUTPATIENT)
Dept: CASE MANAGEMENT | Facility: OTHER | Age: 87
End: 2022-01-01

## 2022-01-01 ENCOUNTER — EPISODE CHANGES (OUTPATIENT)
Dept: CASE MANAGEMENT | Facility: OTHER | Age: 87
End: 2022-01-01

## 2022-01-01 ENCOUNTER — HOSPITAL ENCOUNTER (INPATIENT)
Facility: HOSPITAL | Age: 87
LOS: 6 days | Discharge: NURSING, REHAB OR LONG-TERM CARE | DRG: 064 | End: 2022-06-30
Attending: EMERGENCY MEDICINE | Admitting: INTERNAL MEDICINE
Payer: MEDICARE

## 2022-01-01 ENCOUNTER — PATIENT OUTREACH (OUTPATIENT)
Dept: CASE MANAGEMENT | Facility: HOSPITAL | Age: 87
End: 2022-01-01

## 2022-01-01 VITALS
DIASTOLIC BLOOD PRESSURE: 63 MMHG | RESPIRATION RATE: 18 BRPM | OXYGEN SATURATION: 97 % | BODY MASS INDEX: 38.98 KG/M2 | HEIGHT: 63 IN | HEART RATE: 65 BPM | TEMPERATURE: 97.3 F | SYSTOLIC BLOOD PRESSURE: 135 MMHG | WEIGHT: 220.02 LBS

## 2022-01-01 DIAGNOSIS — I15.9 SECONDARY HYPERTENSION: ICD-10-CM

## 2022-01-01 DIAGNOSIS — I25.10 3-VESSEL CORONARY ARTERY DISEASE: ICD-10-CM

## 2022-01-01 DIAGNOSIS — D32.9 MENINGIOMA (HCC): ICD-10-CM

## 2022-01-01 DIAGNOSIS — E03.9 PRIMARY HYPOTHYROIDISM: ICD-10-CM

## 2022-01-01 DIAGNOSIS — I63.9 CEREBROVASCULAR ACCIDENT (CVA), UNSPECIFIED MECHANISM (HCC): Primary | ICD-10-CM

## 2022-01-01 DIAGNOSIS — G93.89 BRAIN MASS: ICD-10-CM

## 2022-01-01 DIAGNOSIS — I10 UNCONTROLLED HYPERTENSION: ICD-10-CM

## 2022-01-01 DIAGNOSIS — G47.01 INSOMNIA DUE TO MEDICAL CONDITION: ICD-10-CM

## 2022-01-01 DIAGNOSIS — M10.9 GOUT, UNSPECIFIED CAUSE, UNSPECIFIED CHRONICITY, UNSPECIFIED SITE: ICD-10-CM

## 2022-01-01 DIAGNOSIS — R06.02 SHORTNESS OF BREATH: ICD-10-CM

## 2022-01-01 DIAGNOSIS — I73.9 PAD (PERIPHERAL ARTERY DISEASE) (HCC): ICD-10-CM

## 2022-01-01 DIAGNOSIS — M17.0 PRIMARY LOCALIZED OSTEOARTHRITIS OF BOTH KNEES: ICD-10-CM

## 2022-01-01 DIAGNOSIS — R26.9 GAIT DISTURBANCE: Primary | ICD-10-CM

## 2022-01-01 DIAGNOSIS — F32.89 OTHER DEPRESSION: ICD-10-CM

## 2022-01-01 DIAGNOSIS — K57.92 DIVERTICULITIS: ICD-10-CM

## 2022-01-01 DIAGNOSIS — L21.9 SEBORRHEIC DERMATITIS: Primary | ICD-10-CM

## 2022-01-01 DIAGNOSIS — K21.9 GASTROESOPHAGEAL REFLUX DISEASE WITHOUT ESOPHAGITIS: ICD-10-CM

## 2022-01-01 DIAGNOSIS — M48.061 SPINAL STENOSIS OF LUMBAR REGION WITHOUT NEUROGENIC CLAUDICATION: ICD-10-CM

## 2022-01-01 DIAGNOSIS — M15.9 GENERALIZED OSTEOARTHRITIS: ICD-10-CM

## 2022-01-01 DIAGNOSIS — Z85.3 HISTORY OF RIGHT BREAST CANCER: ICD-10-CM

## 2022-01-01 DIAGNOSIS — I10 UNCONTROLLED HYPERTENSION: Primary | ICD-10-CM

## 2022-01-01 DIAGNOSIS — R26.9 GAIT DISTURBANCE: ICD-10-CM

## 2022-01-01 DIAGNOSIS — R26.2 AMBULATORY DYSFUNCTION: ICD-10-CM

## 2022-01-01 DIAGNOSIS — F41.1 GENERALIZED ANXIETY DISORDER: ICD-10-CM

## 2022-01-01 DIAGNOSIS — G62.9 PERIPHERAL POLYNEUROPATHY: ICD-10-CM

## 2022-01-01 DIAGNOSIS — I63.9 CEREBROVASCULAR ACCIDENT (CVA), UNSPECIFIED MECHANISM (HCC): ICD-10-CM

## 2022-01-01 DIAGNOSIS — I10 HYPERTENSION, UNSPECIFIED TYPE: ICD-10-CM

## 2022-01-01 DIAGNOSIS — I10 BENIGN ESSENTIAL HYPERTENSION: ICD-10-CM

## 2022-01-01 DIAGNOSIS — N39.46 MIXED STRESS AND URGE URINARY INCONTINENCE: ICD-10-CM

## 2022-01-01 DIAGNOSIS — D69.6 THROMBOCYTOPENIA (HCC): ICD-10-CM

## 2022-01-01 DIAGNOSIS — I63.9 STROKE (CEREBRUM) (HCC): ICD-10-CM

## 2022-01-01 LAB
2HR DELTA HS TROPONIN: 0 NG/L
ALBUMIN SERPL BCP-MCNC: 2.9 G/DL (ref 3.5–5)
ALBUMIN SERPL BCP-MCNC: 2.9 G/DL (ref 3.5–5)
ALBUMIN SERPL BCP-MCNC: 3 G/DL (ref 3.5–5)
ALBUMIN SERPL BCP-MCNC: 3 G/DL (ref 3.5–5)
ALBUMIN SERPL BCP-MCNC: 3.1 G/DL (ref 3.5–5)
ALP SERPL-CCNC: 126 U/L (ref 46–116)
ALP SERPL-CCNC: 135 U/L (ref 46–116)
ALP SERPL-CCNC: 147 U/L (ref 46–116)
ALP SERPL-CCNC: 152 U/L (ref 46–116)
ALP SERPL-CCNC: 161 U/L (ref 46–116)
ALT SERPL W P-5'-P-CCNC: 20 U/L (ref 12–78)
ALT SERPL W P-5'-P-CCNC: 20 U/L (ref 12–78)
ALT SERPL W P-5'-P-CCNC: 22 U/L (ref 12–78)
ALT SERPL W P-5'-P-CCNC: 27 U/L (ref 12–78)
ALT SERPL W P-5'-P-CCNC: 36 U/L (ref 12–78)
ANION GAP SERPL CALCULATED.3IONS-SCNC: 10 MMOL/L (ref 4–13)
ANION GAP SERPL CALCULATED.3IONS-SCNC: 10 MMOL/L (ref 4–13)
ANION GAP SERPL CALCULATED.3IONS-SCNC: 4 MMOL/L (ref 4–13)
ANION GAP SERPL CALCULATED.3IONS-SCNC: 5 MMOL/L (ref 4–13)
ANION GAP SERPL CALCULATED.3IONS-SCNC: 7 MMOL/L (ref 4–13)
ANION GAP SERPL CALCULATED.3IONS-SCNC: 7 MMOL/L (ref 4–13)
AORTIC ROOT: 3.1 CM
AORTIC VALVE MEAN VELOCITY: 6.8 M/S
APICAL FOUR CHAMBER EJECTION FRACTION: 52 %
APTT PPP: 21 SECONDS (ref 23–37)
APTT PPP: 26 SECONDS (ref 23–37)
ASCENDING AORTA: 3.2 CM
AST SERPL W P-5'-P-CCNC: 12 U/L (ref 5–45)
AST SERPL W P-5'-P-CCNC: 14 U/L (ref 5–45)
AST SERPL W P-5'-P-CCNC: 15 U/L (ref 5–45)
AST SERPL W P-5'-P-CCNC: 16 U/L (ref 5–45)
AST SERPL W P-5'-P-CCNC: 37 U/L (ref 5–45)
ATRIAL RATE: 58 BPM
AV LVOT MEAN GRADIENT: 1 MMHG
AV LVOT PEAK GRADIENT: 3 MMHG
AV MEAN GRADIENT: 2 MMHG
AV PEAK GRADIENT: 6 MMHG
AV VELOCITY RATIO: 0.69
BACTERIA BLD CULT: NORMAL
BACTERIA BLD CULT: NORMAL
BACTERIA UR QL AUTO: NORMAL /HPF
BASOPHILS # BLD AUTO: 0.01 THOUSANDS/ΜL (ref 0–0.1)
BASOPHILS # BLD AUTO: 0.01 THOUSANDS/ΜL (ref 0–0.1)
BASOPHILS # BLD AUTO: 0.02 THOUSANDS/ΜL (ref 0–0.1)
BASOPHILS # BLD AUTO: 0.02 THOUSANDS/ΜL (ref 0–0.1)
BASOPHILS # BLD AUTO: 0.04 THOUSANDS/ΜL (ref 0–0.1)
BASOPHILS # BLD AUTO: 0.05 THOUSANDS/ΜL (ref 0–0.1)
BASOPHILS NFR BLD AUTO: 0 % (ref 0–1)
BILIRUB SERPL-MCNC: 0.48 MG/DL (ref 0.2–1)
BILIRUB SERPL-MCNC: 0.5 MG/DL (ref 0.2–1)
BILIRUB SERPL-MCNC: 0.5 MG/DL (ref 0.2–1)
BILIRUB SERPL-MCNC: 0.56 MG/DL (ref 0.2–1)
BILIRUB SERPL-MCNC: 0.66 MG/DL (ref 0.2–1)
BILIRUB UR QL STRIP: NEGATIVE
BLD SMEAR INTERP: NORMAL
BUN SERPL-MCNC: 24 MG/DL (ref 5–25)
BUN SERPL-MCNC: 26 MG/DL (ref 5–25)
BUN SERPL-MCNC: 27 MG/DL (ref 5–25)
BUN SERPL-MCNC: 28 MG/DL (ref 5–25)
BUN SERPL-MCNC: 30 MG/DL (ref 5–25)
BUN SERPL-MCNC: 34 MG/DL (ref 5–25)
CALCIUM ALBUM COR SERPL-MCNC: 10.1 MG/DL (ref 8.3–10.1)
CALCIUM ALBUM COR SERPL-MCNC: 9.7 MG/DL (ref 8.3–10.1)
CALCIUM ALBUM COR SERPL-MCNC: 9.8 MG/DL (ref 8.3–10.1)
CALCIUM ALBUM COR SERPL-MCNC: 9.8 MG/DL (ref 8.3–10.1)
CALCIUM ALBUM COR SERPL-MCNC: 9.9 MG/DL (ref 8.3–10.1)
CALCIUM SERPL-MCNC: 8.8 MG/DL (ref 8.3–10.1)
CALCIUM SERPL-MCNC: 9 MG/DL (ref 8.3–10.1)
CALCIUM SERPL-MCNC: 9 MG/DL (ref 8.3–10.1)
CALCIUM SERPL-MCNC: 9.2 MG/DL (ref 8.3–10.1)
CALCIUM SERPL-MCNC: 9.2 MG/DL (ref 8.3–10.1)
CALCIUM SERPL-MCNC: 9.3 MG/DL (ref 8.3–10.1)
CARDIAC TROPONIN I PNL SERPL HS: 7 NG/L
CARDIAC TROPONIN I PNL SERPL HS: 7 NG/L
CHLORIDE SERPL-SCNC: 101 MMOL/L (ref 100–108)
CHLORIDE SERPL-SCNC: 105 MMOL/L (ref 100–108)
CHLORIDE SERPL-SCNC: 99 MMOL/L (ref 100–108)
CHOLEST SERPL-MCNC: 207 MG/DL
CHOLEST SERPL-MCNC: 261 MG/DL
CLARITY UR: CLEAR
CO2 SERPL-SCNC: 24 MMOL/L (ref 21–32)
CO2 SERPL-SCNC: 25 MMOL/L (ref 21–32)
CO2 SERPL-SCNC: 26 MMOL/L (ref 21–32)
CO2 SERPL-SCNC: 27 MMOL/L (ref 21–32)
CO2 SERPL-SCNC: 27 MMOL/L (ref 21–32)
CO2 SERPL-SCNC: 29 MMOL/L (ref 21–32)
COLOR UR: YELLOW
COLOR, POC: NORMAL
CREAT SERPL-MCNC: 1.29 MG/DL (ref 0.6–1.3)
CREAT SERPL-MCNC: 1.44 MG/DL (ref 0.6–1.3)
CREAT SERPL-MCNC: 1.45 MG/DL (ref 0.6–1.3)
CREAT SERPL-MCNC: 1.46 MG/DL (ref 0.6–1.3)
CREAT SERPL-MCNC: 1.51 MG/DL (ref 0.6–1.3)
CREAT SERPL-MCNC: 1.62 MG/DL (ref 0.6–1.3)
D DIMER PPP FEU-MCNC: 1.9 UG/ML FEU
DOP CALC AO PEAK VEL: 1.15 M/S
DOP CALC AO VTI: 26.09 CM
DOP CALC LVOT PEAK VEL VTI: 23.04 CM
DOP CALC LVOT PEAK VEL: 0.79 M/S
DOP CALC MV VTI: 32.85 CM
E WAVE DECELERATION TIME: 224 MS
EOSINOPHIL # BLD AUTO: 0 THOUSAND/ΜL (ref 0–0.61)
EOSINOPHIL # BLD AUTO: 0 THOUSAND/ΜL (ref 0–0.61)
EOSINOPHIL # BLD AUTO: 0.01 THOUSAND/ΜL (ref 0–0.61)
EOSINOPHIL # BLD AUTO: 0.02 THOUSAND/ΜL (ref 0–0.61)
EOSINOPHIL # BLD AUTO: 0.06 THOUSAND/ΜL (ref 0–0.61)
EOSINOPHIL # BLD AUTO: 0.1 THOUSAND/ΜL (ref 0–0.61)
EOSINOPHIL NFR BLD AUTO: 0 % (ref 0–6)
EOSINOPHIL NFR BLD AUTO: 1 % (ref 0–6)
EOSINOPHIL NFR BLD AUTO: 1 % (ref 0–6)
ERYTHROCYTE [DISTWIDTH] IN BLOOD BY AUTOMATED COUNT: 13.8 % (ref 11.6–15.1)
ERYTHROCYTE [DISTWIDTH] IN BLOOD BY AUTOMATED COUNT: 14 % (ref 11.6–15.1)
ERYTHROCYTE [DISTWIDTH] IN BLOOD BY AUTOMATED COUNT: 14.1 % (ref 11.6–15.1)
ERYTHROCYTE [DISTWIDTH] IN BLOOD BY AUTOMATED COUNT: 14.1 % (ref 11.6–15.1)
ERYTHROCYTE [DISTWIDTH] IN BLOOD BY AUTOMATED COUNT: 14.3 % (ref 11.6–15.1)
ERYTHROCYTE [DISTWIDTH] IN BLOOD BY AUTOMATED COUNT: 14.3 % (ref 11.6–15.1)
EST. AVERAGE GLUCOSE BLD GHB EST-MCNC: 126 MG/DL
EST. AVERAGE GLUCOSE BLD GHB EST-MCNC: 126 MG/DL
FDP BLD QL AGGL: <10
FIBRINOGEN PPP-MCNC: 512 MG/DL (ref 227–495)
FLUAV RNA RESP QL NAA+PROBE: NEGATIVE
FLUBV RNA RESP QL NAA+PROBE: NEGATIVE
FRACTIONAL SHORTENING: 27 % (ref 28–44)
GFR SERPL CREATININE-BSD FRML MDRD: 27 ML/MIN/1.73SQ M
GFR SERPL CREATININE-BSD FRML MDRD: 29 ML/MIN/1.73SQ M
GFR SERPL CREATININE-BSD FRML MDRD: 31 ML/MIN/1.73SQ M
GFR SERPL CREATININE-BSD FRML MDRD: 36 ML/MIN/1.73SQ M
GLUCOSE SERPL-MCNC: 122 MG/DL (ref 65–140)
GLUCOSE SERPL-MCNC: 126 MG/DL (ref 65–140)
GLUCOSE SERPL-MCNC: 146 MG/DL (ref 65–140)
GLUCOSE SERPL-MCNC: 154 MG/DL (ref 65–140)
GLUCOSE SERPL-MCNC: 175 MG/DL (ref 65–140)
GLUCOSE SERPL-MCNC: 205 MG/DL (ref 65–140)
GLUCOSE UR STRIP-MCNC: NEGATIVE MG/DL
HAPTOGLOB SERPL-MCNC: 244 MG/DL (ref 41–333)
HBA1C MFR BLD: 6 %
HBA1C MFR BLD: 6 %
HCT VFR BLD AUTO: 30.8 % (ref 34.8–46.1)
HCT VFR BLD AUTO: 37.2 % (ref 34.8–46.1)
HCT VFR BLD AUTO: 37.7 % (ref 34.8–46.1)
HCT VFR BLD AUTO: 39.4 % (ref 34.8–46.1)
HCT VFR BLD AUTO: 40.5 % (ref 34.8–46.1)
HCT VFR BLD AUTO: 40.9 % (ref 34.8–46.1)
HDLC SERPL-MCNC: 57 MG/DL
HDLC SERPL-MCNC: 58 MG/DL
HGB BLD-MCNC: 12.1 G/DL (ref 11.5–15.4)
HGB BLD-MCNC: 12.7 G/DL (ref 11.5–15.4)
HGB BLD-MCNC: 13.2 G/DL (ref 11.5–15.4)
HGB BLD-MCNC: 13.6 G/DL (ref 11.5–15.4)
HGB BLD-MCNC: 13.6 G/DL (ref 11.5–15.4)
HGB BLD-MCNC: 9.9 G/DL (ref 11.5–15.4)
HGB UR QL STRIP.AUTO: NEGATIVE
IMM GRANULOCYTES # BLD AUTO: 0.02 THOUSAND/UL (ref 0–0.2)
IMM GRANULOCYTES # BLD AUTO: 0.05 THOUSAND/UL (ref 0–0.2)
IMM GRANULOCYTES # BLD AUTO: 0.05 THOUSAND/UL (ref 0–0.2)
IMM GRANULOCYTES # BLD AUTO: 0.09 THOUSAND/UL (ref 0–0.2)
IMM GRANULOCYTES # BLD AUTO: 0.1 THOUSAND/UL (ref 0–0.2)
IMM GRANULOCYTES # BLD AUTO: 0.18 THOUSAND/UL (ref 0–0.2)
IMM GRANULOCYTES NFR BLD AUTO: 0 % (ref 0–2)
IMM GRANULOCYTES NFR BLD AUTO: 1 % (ref 0–2)
INR PPP: 1 (ref 0.84–1.19)
INR PPP: 1.02 (ref 0.84–1.19)
INTERVENTRICULAR SEPTUM IN DIASTOLE (PARASTERNAL SHORT AXIS VIEW): 2.1 CM
INTERVENTRICULAR SEPTUM: 2.1 CM (ref 0.6–1.1)
KETONES UR STRIP-MCNC: NEGATIVE MG/DL
LAAS-AP4: 25.9 CM2
LACTATE SERPL-SCNC: 1.9 MMOL/L (ref 0.5–2)
LDH SERPL-CCNC: 194 IU/L (ref 119–226)
LDH1 CFR SERPL ELPH: 22 % (ref 17–32)
LDH2 CFR SERPL ELPH: 38 % (ref 25–40)
LDH3 CFR SERPL ELPH: 20 % (ref 17–27)
LDH4 CFR SERPL ELPH: 9 % (ref 5–13)
LDH5 CFR SERPL ELPH: 11 % (ref 4–20)
LDLC SERPL CALC-MCNC: 125 MG/DL (ref 0–100)
LDLC SERPL CALC-MCNC: 170 MG/DL (ref 0–100)
LEFT ATRIUM SIZE: 5.2 CM
LEFT INTERNAL DIMENSION IN SYSTOLE: 3.3 CM (ref 2.1–4)
LEFT VENTRICULAR INTERNAL DIMENSION IN DIASTOLE: 4.5 CM (ref 3.5–6)
LEFT VENTRICULAR POSTERIOR WALL IN END DIASTOLE: 1.6 CM
LEFT VENTRICULAR STROKE VOLUME: 47 ML
LEUKOCYTE ESTERASE UR QL STRIP: NEGATIVE
LVSV (TEICH): 47 ML
LYMPHOCYTES # BLD AUTO: 0.82 THOUSANDS/ΜL (ref 0.6–4.47)
LYMPHOCYTES # BLD AUTO: 0.82 THOUSANDS/ΜL (ref 0.6–4.47)
LYMPHOCYTES # BLD AUTO: 1.23 THOUSANDS/ΜL (ref 0.6–4.47)
LYMPHOCYTES # BLD AUTO: 1.52 THOUSANDS/ΜL (ref 0.6–4.47)
LYMPHOCYTES # BLD AUTO: 1.93 THOUSANDS/ΜL (ref 0.6–4.47)
LYMPHOCYTES # BLD AUTO: 1.97 THOUSANDS/ΜL (ref 0.6–4.47)
LYMPHOCYTES NFR BLD AUTO: 10 % (ref 14–44)
LYMPHOCYTES NFR BLD AUTO: 15 % (ref 14–44)
LYMPHOCYTES NFR BLD AUTO: 15 % (ref 14–44)
LYMPHOCYTES NFR BLD AUTO: 23 % (ref 14–44)
LYMPHOCYTES NFR BLD AUTO: 5 % (ref 14–44)
LYMPHOCYTES NFR BLD AUTO: 8 % (ref 14–44)
MAGNESIUM SERPL-MCNC: 2.2 MG/DL (ref 1.6–2.6)
MCH RBC QN AUTO: 30.1 PG (ref 26.8–34.3)
MCH RBC QN AUTO: 30.3 PG (ref 26.8–34.3)
MCH RBC QN AUTO: 30.6 PG (ref 26.8–34.3)
MCH RBC QN AUTO: 31.3 PG (ref 26.8–34.3)
MCHC RBC AUTO-ENTMCNC: 32.1 G/DL (ref 31.4–37.4)
MCHC RBC AUTO-ENTMCNC: 32.5 G/DL (ref 31.4–37.4)
MCHC RBC AUTO-ENTMCNC: 33.3 G/DL (ref 31.4–37.4)
MCHC RBC AUTO-ENTMCNC: 33.5 G/DL (ref 31.4–37.4)
MCHC RBC AUTO-ENTMCNC: 33.6 G/DL (ref 31.4–37.4)
MCHC RBC AUTO-ENTMCNC: 33.7 G/DL (ref 31.4–37.4)
MCV RBC AUTO: 90 FL (ref 82–98)
MCV RBC AUTO: 91 FL (ref 82–98)
MCV RBC AUTO: 91 FL (ref 82–98)
MCV RBC AUTO: 93 FL (ref 82–98)
MCV RBC AUTO: 93 FL (ref 82–98)
MCV RBC AUTO: 95 FL (ref 82–98)
MONOCYTES # BLD AUTO: 0.12 THOUSAND/ΜL (ref 0.17–1.22)
MONOCYTES # BLD AUTO: 0.41 THOUSAND/ΜL (ref 0.17–1.22)
MONOCYTES # BLD AUTO: 0.49 THOUSAND/ΜL (ref 0.17–1.22)
MONOCYTES # BLD AUTO: 0.84 THOUSAND/ΜL (ref 0.17–1.22)
MONOCYTES # BLD AUTO: 1.05 THOUSAND/ΜL (ref 0.17–1.22)
MONOCYTES # BLD AUTO: 1.47 THOUSAND/ΜL (ref 0.17–1.22)
MONOCYTES NFR BLD AUTO: 1 % (ref 4–12)
MONOCYTES NFR BLD AUTO: 12 % (ref 4–12)
MONOCYTES NFR BLD AUTO: 2 % (ref 4–12)
MONOCYTES NFR BLD AUTO: 5 % (ref 4–12)
MONOCYTES NFR BLD AUTO: 8 % (ref 4–12)
MONOCYTES NFR BLD AUTO: 9 % (ref 4–12)
MV E'TISSUE VEL-LAT: 7 CM/S
MV E'TISSUE VEL-SEP: 4 CM/S
MV MEAN GRADIENT: 1 MMHG
MV PEAK A VEL: 0.96 M/S
MV PEAK E VEL: 84 CM/S
MV PEAK GRADIENT: 4 MMHG
MV STENOSIS PRESSURE HALF TIME: 65 MS
MV VALVE AREA P 1/2 METHOD: 3.38 CM2
NEUTROPHILS # BLD AUTO: 10.18 THOUSANDS/ΜL (ref 1.85–7.62)
NEUTROPHILS # BLD AUTO: 13.34 THOUSANDS/ΜL (ref 1.85–7.62)
NEUTROPHILS # BLD AUTO: 16.8 THOUSANDS/ΜL (ref 1.85–7.62)
NEUTROPHILS # BLD AUTO: 3.5 THOUSANDS/ΜL (ref 1.85–7.62)
NEUTROPHILS # BLD AUTO: 8.76 THOUSANDS/ΜL (ref 1.85–7.62)
NEUTROPHILS # BLD AUTO: 9.19 THOUSANDS/ΜL (ref 1.85–7.62)
NEUTS SEG NFR BLD AUTO: 68 % (ref 43–75)
NEUTS SEG NFR BLD AUTO: 72 % (ref 43–75)
NEUTS SEG NFR BLD AUTO: 76 % (ref 43–75)
NEUTS SEG NFR BLD AUTO: 84 % (ref 43–75)
NEUTS SEG NFR BLD AUTO: 90 % (ref 43–75)
NEUTS SEG NFR BLD AUTO: 92 % (ref 43–75)
NITRITE UR QL STRIP: NEGATIVE
NON-SQ EPI CELLS URNS QL MICRO: NORMAL /HPF
NONHDLC SERPL-MCNC: 204 MG/DL
NRBC BLD AUTO-RTO: 0 /100 WBCS
P AXIS: 38 DEGREES
PH UR STRIP.AUTO: 7 [PH] (ref 4.5–8)
PHOSPHATE SERPL-MCNC: 3.6 MG/DL (ref 2.3–4.1)
PLATELET # BLD AUTO: 22 THOUSANDS/UL (ref 149–390)
PLATELET # BLD AUTO: 224 THOUSANDS/UL (ref 149–390)
PLATELET # BLD AUTO: 225 THOUSANDS/UL (ref 149–390)
PLATELET # BLD AUTO: 236 THOUSANDS/UL (ref 149–390)
PLATELET # BLD AUTO: 242 THOUSANDS/UL (ref 149–390)
PLATELET # BLD AUTO: 252 THOUSANDS/UL (ref 149–390)
PMV BLD AUTO: 10.8 FL (ref 8.9–12.7)
PMV BLD AUTO: 10.8 FL (ref 8.9–12.7)
PMV BLD AUTO: 11.1 FL (ref 8.9–12.7)
PMV BLD AUTO: 11.1 FL (ref 8.9–12.7)
PMV BLD AUTO: 11.3 FL (ref 8.9–12.7)
PMV BLD AUTO: 13.7 FL (ref 8.9–12.7)
POTASSIUM SERPL-SCNC: 3.9 MMOL/L (ref 3.5–5.3)
POTASSIUM SERPL-SCNC: 3.9 MMOL/L (ref 3.5–5.3)
POTASSIUM SERPL-SCNC: 4 MMOL/L (ref 3.5–5.3)
POTASSIUM SERPL-SCNC: 4.1 MMOL/L (ref 3.5–5.3)
POTASSIUM SERPL-SCNC: 4.2 MMOL/L (ref 3.5–5.3)
POTASSIUM SERPL-SCNC: 4.4 MMOL/L (ref 3.5–5.3)
PR INTERVAL: 178 MS
PROCALCITONIN SERPL-MCNC: 0.06 NG/ML
PROT SERPL-MCNC: 6.4 G/DL (ref 6.4–8.2)
PROT SERPL-MCNC: 6.9 G/DL (ref 6.4–8.2)
PROT SERPL-MCNC: 7 G/DL (ref 6.4–8.2)
PROT SERPL-MCNC: 7.3 G/DL (ref 6.4–8.2)
PROT SERPL-MCNC: 7.5 G/DL (ref 6.4–8.2)
PROT UR STRIP-MCNC: ABNORMAL MG/DL
PROTHROMBIN TIME: 12.8 SECONDS (ref 11.6–14.5)
PROTHROMBIN TIME: 13 SECONDS (ref 11.6–14.5)
QRS AXIS: 19 DEGREES
QRSD INTERVAL: 80 MS
QT INTERVAL: 424 MS
QTC INTERVAL: 416 MS
RA PRESSURE ESTIMATED: 5 MMHG
RBC # BLD AUTO: 3.24 MILLION/UL (ref 3.81–5.12)
RBC # BLD AUTO: 4.02 MILLION/UL (ref 3.81–5.12)
RBC # BLD AUTO: 4.19 MILLION/UL (ref 3.81–5.12)
RBC # BLD AUTO: 4.34 MILLION/UL (ref 3.81–5.12)
RBC # BLD AUTO: 4.35 MILLION/UL (ref 3.81–5.12)
RBC # BLD AUTO: 4.49 MILLION/UL (ref 3.81–5.12)
RBC #/AREA URNS AUTO: NORMAL /HPF
RIGHT ATRIAL 2D VOLUME: 72 ML
RIGHT ATRIUM AREA SYSTOLE A4C: 22 CM2
RIGHT VENTRICLE ID DIMENSION: 4 CM
RSV RNA RESP QL NAA+PROBE: NEGATIVE
RV PSP: 33 MMHG
SARS-COV-2 RNA RESP QL NAA+PROBE: NEGATIVE
SL CV LV EF: 65
SL CV PED ECHO LEFT VENTRICLE DIASTOLIC VOLUME (MOD BIPLANE) 2D: 92 ML
SL CV PED ECHO LEFT VENTRICLE SYSTOLIC VOLUME (MOD BIPLANE) 2D: 45 ML
SODIUM SERPL-SCNC: 132 MMOL/L (ref 136–145)
SODIUM SERPL-SCNC: 133 MMOL/L (ref 136–145)
SODIUM SERPL-SCNC: 133 MMOL/L (ref 136–145)
SODIUM SERPL-SCNC: 134 MMOL/L (ref 136–145)
SODIUM SERPL-SCNC: 135 MMOL/L (ref 136–145)
SODIUM SERPL-SCNC: 136 MMOL/L (ref 136–145)
SP GR UR STRIP.AUTO: 1.01 (ref 1–1.03)
T WAVE AXIS: 94 DEGREES
TR MAX PG: 28 MMHG
TR PEAK VELOCITY: 2.7 M/S
TRICUSPID VALVE PEAK REGURGITATION VELOCITY: 2.65 M/S
TRIGL SERPL-MCNC: 119 MG/DL
TRIGL SERPL-MCNC: 169 MG/DL
TSH SERPL DL<=0.05 MIU/L-ACNC: 1.7 UIU/ML (ref 0.45–4.5)
UROBILINOGEN UR QL STRIP.AUTO: 0.2 E.U./DL
VENTRICULAR RATE: 58 BPM
WBC # BLD AUTO: 12.82 THOUSAND/UL (ref 4.31–10.16)
WBC # BLD AUTO: 13.32 THOUSAND/UL (ref 4.31–10.16)
WBC # BLD AUTO: 15.82 THOUSAND/UL (ref 4.31–10.16)
WBC # BLD AUTO: 18.23 THOUSAND/UL (ref 4.31–10.16)
WBC # BLD AUTO: 5.27 THOUSAND/UL (ref 4.31–10.16)
WBC # BLD AUTO: 9.81 THOUSAND/UL (ref 4.31–10.16)
WBC #/AREA URNS AUTO: NORMAL /HPF

## 2022-01-01 PROCEDURE — 93010 ELECTROCARDIOGRAM REPORT: CPT | Performed by: INTERNAL MEDICINE

## 2022-01-01 PROCEDURE — 80053 COMPREHEN METABOLIC PANEL: CPT | Performed by: INTERNAL MEDICINE

## 2022-01-01 PROCEDURE — 97530 THERAPEUTIC ACTIVITIES: CPT

## 2022-01-01 PROCEDURE — 93306 TTE W/DOPPLER COMPLETE: CPT | Performed by: INTERNAL MEDICINE

## 2022-01-01 PROCEDURE — 85610 PROTHROMBIN TIME: CPT | Performed by: INTERNAL MEDICINE

## 2022-01-01 PROCEDURE — 70496 CT ANGIOGRAPHY HEAD: CPT

## 2022-01-01 PROCEDURE — 99308 SBSQ NF CARE LOW MDM 20: CPT | Performed by: FAMILY MEDICINE

## 2022-01-01 PROCEDURE — 92610 EVALUATE SWALLOWING FUNCTION: CPT

## 2022-01-01 PROCEDURE — 83625 ASSAY OF LDH ENZYMES: CPT | Performed by: EMERGENCY MEDICINE

## 2022-01-01 PROCEDURE — 84484 ASSAY OF TROPONIN QUANT: CPT

## 2022-01-01 PROCEDURE — 83605 ASSAY OF LACTIC ACID: CPT

## 2022-01-01 PROCEDURE — 84443 ASSAY THYROID STIM HORMONE: CPT | Performed by: INTERNAL MEDICINE

## 2022-01-01 PROCEDURE — 80053 COMPREHEN METABOLIC PANEL: CPT

## 2022-01-01 PROCEDURE — 85610 PROTHROMBIN TIME: CPT

## 2022-01-01 PROCEDURE — 97167 OT EVAL HIGH COMPLEX 60 MIN: CPT

## 2022-01-01 PROCEDURE — A9585 GADOBUTROL INJECTION: HCPCS | Performed by: INTERNAL MEDICINE

## 2022-01-01 PROCEDURE — 83615 LACTATE (LD) (LDH) ENZYME: CPT | Performed by: EMERGENCY MEDICINE

## 2022-01-01 PROCEDURE — 36415 COLL VENOUS BLD VENIPUNCTURE: CPT

## 2022-01-01 PROCEDURE — 85730 THROMBOPLASTIN TIME PARTIAL: CPT | Performed by: INTERNAL MEDICINE

## 2022-01-01 PROCEDURE — 83036 HEMOGLOBIN GLYCOSYLATED A1C: CPT | Performed by: INTERNAL MEDICINE

## 2022-01-01 PROCEDURE — 84145 PROCALCITONIN (PCT): CPT

## 2022-01-01 PROCEDURE — 83735 ASSAY OF MAGNESIUM: CPT | Performed by: INTERNAL MEDICINE

## 2022-01-01 PROCEDURE — 97163 PT EVAL HIGH COMPLEX 45 MIN: CPT

## 2022-01-01 PROCEDURE — 96374 THER/PROPH/DIAG INJ IV PUSH: CPT

## 2022-01-01 PROCEDURE — 85025 COMPLETE CBC W/AUTO DIFF WBC: CPT | Performed by: INTERNAL MEDICINE

## 2022-01-01 PROCEDURE — 99232 SBSQ HOSP IP/OBS MODERATE 35: CPT | Performed by: INTERNAL MEDICINE

## 2022-01-01 PROCEDURE — 80061 LIPID PANEL: CPT | Performed by: INTERNAL MEDICINE

## 2022-01-01 PROCEDURE — 76705 ECHO EXAM OF ABDOMEN: CPT

## 2022-01-01 PROCEDURE — C9113 INJ PANTOPRAZOLE SODIUM, VIA: HCPCS | Performed by: INTERNAL MEDICINE

## 2022-01-01 PROCEDURE — 70553 MRI BRAIN STEM W/O & W/DYE: CPT

## 2022-01-01 PROCEDURE — 84100 ASSAY OF PHOSPHORUS: CPT | Performed by: INTERNAL MEDICINE

## 2022-01-01 PROCEDURE — 70498 CT ANGIOGRAPHY NECK: CPT

## 2022-01-01 PROCEDURE — G1004 CDSM NDSC: HCPCS

## 2022-01-01 PROCEDURE — 0241U HB NFCT DS VIR RESP RNA 4 TRGT: CPT | Performed by: INTERNAL MEDICINE

## 2022-01-01 PROCEDURE — 99223 1ST HOSP IP/OBS HIGH 75: CPT | Performed by: INTERNAL MEDICINE

## 2022-01-01 PROCEDURE — 99285 EMERGENCY DEPT VISIT HI MDM: CPT

## 2022-01-01 PROCEDURE — 83010 ASSAY OF HAPTOGLOBIN QUANT: CPT | Performed by: EMERGENCY MEDICINE

## 2022-01-01 PROCEDURE — 97112 NEUROMUSCULAR REEDUCATION: CPT

## 2022-01-01 PROCEDURE — 99233 SBSQ HOSP IP/OBS HIGH 50: CPT | Performed by: INTERNAL MEDICINE

## 2022-01-01 PROCEDURE — 85379 FIBRIN DEGRADATION QUANT: CPT | Performed by: EMERGENCY MEDICINE

## 2022-01-01 PROCEDURE — 99222 1ST HOSP IP/OBS MODERATE 55: CPT | Performed by: PSYCHIATRY & NEUROLOGY

## 2022-01-01 PROCEDURE — 93005 ELECTROCARDIOGRAM TRACING: CPT

## 2022-01-01 PROCEDURE — C8929 TTE W OR WO FOL WCON,DOPPLER: HCPCS

## 2022-01-01 PROCEDURE — 97535 SELF CARE MNGMENT TRAINING: CPT

## 2022-01-01 PROCEDURE — 81001 URINALYSIS AUTO W/SCOPE: CPT

## 2022-01-01 PROCEDURE — 74176 CT ABD & PELVIS W/O CONTRAST: CPT

## 2022-01-01 PROCEDURE — 85362 FIBRIN DEGRADATION PRODUCTS: CPT | Performed by: EMERGENCY MEDICINE

## 2022-01-01 PROCEDURE — 85730 THROMBOPLASTIN TIME PARTIAL: CPT

## 2022-01-01 PROCEDURE — 71045 X-RAY EXAM CHEST 1 VIEW: CPT

## 2022-01-01 PROCEDURE — 99285 EMERGENCY DEPT VISIT HI MDM: CPT | Performed by: EMERGENCY MEDICINE

## 2022-01-01 PROCEDURE — 99222 1ST HOSP IP/OBS MODERATE 55: CPT | Performed by: INTERNAL MEDICINE

## 2022-01-01 PROCEDURE — 87040 BLOOD CULTURE FOR BACTERIA: CPT

## 2022-01-01 PROCEDURE — 99239 HOSP IP/OBS DSCHRG MGMT >30: CPT | Performed by: INTERNAL MEDICINE

## 2022-01-01 PROCEDURE — 85025 COMPLETE CBC W/AUTO DIFF WBC: CPT

## 2022-01-01 PROCEDURE — 80048 BASIC METABOLIC PNL TOTAL CA: CPT | Performed by: INTERNAL MEDICINE

## 2022-01-01 PROCEDURE — 85384 FIBRINOGEN ACTIVITY: CPT | Performed by: EMERGENCY MEDICINE

## 2022-01-01 PROCEDURE — 99306 1ST NF CARE HIGH MDM 50: CPT | Performed by: FAMILY MEDICINE

## 2022-01-01 PROCEDURE — 99223 1ST HOSP IP/OBS HIGH 75: CPT | Performed by: PHYSICIAN ASSISTANT

## 2022-01-01 RX ORDER — AMLODIPINE BESYLATE 10 MG/1
10 TABLET ORAL DAILY
Status: DISCONTINUED | OUTPATIENT
Start: 2022-01-01 | End: 2022-01-01 | Stop reason: HOSPADM

## 2022-01-01 RX ORDER — ALLOPURINOL 100 MG/1
100 TABLET ORAL DAILY
Qty: 90 TABLET | Refills: 0 | Status: SHIPPED | OUTPATIENT
Start: 2022-01-01

## 2022-01-01 RX ORDER — ASPIRIN 81 MG/1
81 TABLET, CHEWABLE ORAL DAILY
Status: DISCONTINUED | OUTPATIENT
Start: 2022-01-01 | End: 2022-01-01 | Stop reason: HOSPADM

## 2022-01-01 RX ORDER — HYDRALAZINE HYDROCHLORIDE 20 MG/ML
10 INJECTION INTRAMUSCULAR; INTRAVENOUS EVERY 4 HOURS PRN
Status: DISCONTINUED | OUTPATIENT
Start: 2022-01-01 | End: 2022-01-01

## 2022-01-01 RX ORDER — DEXAMETHASONE 4 MG/1
40 TABLET ORAL DAILY
Status: CANCELLED | OUTPATIENT
Start: 2022-01-01

## 2022-01-01 RX ORDER — CARVEDILOL 25 MG/1
25 TABLET ORAL 2 TIMES DAILY WITH MEALS
Refills: 0
Start: 2022-01-01

## 2022-01-01 RX ORDER — FAMOTIDINE 20 MG/1
20 TABLET, FILM COATED ORAL DAILY
Status: DISCONTINUED | OUTPATIENT
Start: 2022-01-01 | End: 2022-01-01 | Stop reason: HOSPADM

## 2022-01-01 RX ORDER — ATORVASTATIN CALCIUM 40 MG/1
40 TABLET, FILM COATED ORAL EVERY EVENING
Status: DISCONTINUED | OUTPATIENT
Start: 2022-01-01 | End: 2022-01-01 | Stop reason: HOSPADM

## 2022-01-01 RX ORDER — CARVEDILOL 12.5 MG/1
12.5 TABLET ORAL ONCE
Status: COMPLETED | OUTPATIENT
Start: 2022-01-01 | End: 2022-01-01

## 2022-01-01 RX ORDER — MELOXICAM 7.5 MG/1
7.5 TABLET ORAL DAILY
Qty: 30 TABLET | Refills: 0 | Status: CANCELLED | OUTPATIENT
Start: 2022-01-01

## 2022-01-01 RX ORDER — ATORVASTATIN CALCIUM 40 MG/1
40 TABLET, FILM COATED ORAL
Status: DISCONTINUED | OUTPATIENT
Start: 2022-01-01 | End: 2022-01-01

## 2022-01-01 RX ORDER — METOPROLOL SUCCINATE 100 MG/1
100 TABLET, EXTENDED RELEASE ORAL 3 TIMES DAILY
Status: DISCONTINUED | OUTPATIENT
Start: 2022-01-01 | End: 2022-01-01

## 2022-01-01 RX ORDER — PANTOPRAZOLE SODIUM 40 MG/1
40 TABLET, DELAYED RELEASE ORAL DAILY
Status: DISCONTINUED | OUTPATIENT
Start: 2022-01-01 | End: 2022-01-01 | Stop reason: HOSPADM

## 2022-01-01 RX ORDER — AMLODIPINE BESYLATE 5 MG/1
5 TABLET ORAL DAILY
Status: DISCONTINUED | OUTPATIENT
Start: 2022-01-01 | End: 2022-01-01

## 2022-01-01 RX ORDER — LISINOPRIL 40 MG/1
20 TABLET ORAL DAILY
Qty: 90 TABLET | Refills: 1 | Status: SHIPPED | OUTPATIENT
Start: 2022-01-01

## 2022-01-01 RX ORDER — PROMETHAZINE HYDROCHLORIDE 25 MG/ML
12.5 INJECTION, SOLUTION INTRAMUSCULAR; INTRAVENOUS ONCE
Status: COMPLETED | OUTPATIENT
Start: 2022-01-01 | End: 2022-01-01

## 2022-01-01 RX ORDER — DEXAMETHASONE SODIUM PHOSPHATE 4 MG/ML
4 INJECTION, SOLUTION INTRA-ARTICULAR; INTRALESIONAL; INTRAMUSCULAR; INTRAVENOUS; SOFT TISSUE EVERY 8 HOURS SCHEDULED
Status: DISCONTINUED | OUTPATIENT
Start: 2022-01-01 | End: 2022-01-01

## 2022-01-01 RX ORDER — ACETAMINOPHEN 325 MG/1
650 TABLET ORAL EVERY 6 HOURS PRN
Status: DISCONTINUED | OUTPATIENT
Start: 2022-01-01 | End: 2022-01-01 | Stop reason: HOSPADM

## 2022-01-01 RX ORDER — ONDANSETRON 2 MG/ML
4 INJECTION INTRAMUSCULAR; INTRAVENOUS ONCE
Status: COMPLETED | OUTPATIENT
Start: 2022-01-01 | End: 2022-01-01

## 2022-01-01 RX ORDER — MAGNESIUM HYDROXIDE/ALUMINUM HYDROXICE/SIMETHICONE 120; 1200; 1200 MG/30ML; MG/30ML; MG/30ML
30 SUSPENSION ORAL EVERY 6 HOURS PRN
Status: DISCONTINUED | OUTPATIENT
Start: 2022-01-01 | End: 2022-01-01 | Stop reason: HOSPADM

## 2022-01-01 RX ORDER — LEVOTHYROXINE SODIUM 0.05 MG/1
50 TABLET ORAL
Status: DISCONTINUED | OUTPATIENT
Start: 2022-01-01 | End: 2022-01-01 | Stop reason: HOSPADM

## 2022-01-01 RX ORDER — PANTOPRAZOLE SODIUM 40 MG/1
40 TABLET, DELAYED RELEASE ORAL DAILY
Qty: 90 TABLET | Refills: 1 | Status: SHIPPED | OUTPATIENT
Start: 2022-01-01

## 2022-01-01 RX ORDER — BUSPIRONE HYDROCHLORIDE 10 MG/1
10 TABLET ORAL 3 TIMES DAILY
Status: DISCONTINUED | OUTPATIENT
Start: 2022-01-01 | End: 2022-01-01 | Stop reason: HOSPADM

## 2022-01-01 RX ORDER — DOCUSATE SODIUM 100 MG/1
100 CAPSULE, LIQUID FILLED ORAL 2 TIMES DAILY PRN
Status: DISCONTINUED | OUTPATIENT
Start: 2022-01-01 | End: 2022-01-01 | Stop reason: HOSPADM

## 2022-01-01 RX ORDER — LABETALOL HYDROCHLORIDE 5 MG/ML
10 INJECTION, SOLUTION INTRAVENOUS EVERY 6 HOURS PRN
Status: DISCONTINUED | OUTPATIENT
Start: 2022-01-01 | End: 2022-01-01 | Stop reason: HOSPADM

## 2022-01-01 RX ORDER — CARVEDILOL 25 MG/1
25 TABLET ORAL 2 TIMES DAILY WITH MEALS
Status: DISCONTINUED | OUTPATIENT
Start: 2022-01-01 | End: 2022-01-01 | Stop reason: HOSPADM

## 2022-01-01 RX ORDER — FUROSEMIDE 10 MG/ML
40 INJECTION INTRAMUSCULAR; INTRAVENOUS ONCE
Status: COMPLETED | OUTPATIENT
Start: 2022-01-01 | End: 2022-01-01

## 2022-01-01 RX ORDER — CARVEDILOL 12.5 MG/1
12.5 TABLET ORAL 2 TIMES DAILY WITH MEALS
Status: DISCONTINUED | OUTPATIENT
Start: 2022-01-01 | End: 2022-01-01

## 2022-01-01 RX ORDER — LORAZEPAM 0.5 MG/1
0.5 TABLET ORAL 2 TIMES DAILY
Status: DISCONTINUED | OUTPATIENT
Start: 2022-01-01 | End: 2022-01-01 | Stop reason: HOSPADM

## 2022-01-01 RX ORDER — MELOXICAM 7.5 MG/1
TABLET ORAL
Qty: 30 TABLET | Refills: 0 | Status: SHIPPED | OUTPATIENT
Start: 2022-01-01 | End: 2022-01-01

## 2022-01-01 RX ORDER — CARVEDILOL 25 MG/1
25 TABLET ORAL 2 TIMES DAILY WITH MEALS
Status: DISCONTINUED | OUTPATIENT
Start: 2022-01-01 | End: 2022-01-01

## 2022-01-01 RX ORDER — ASPIRIN 81 MG/1
81 TABLET, CHEWABLE ORAL DAILY
Status: DISCONTINUED | OUTPATIENT
Start: 2022-01-01 | End: 2022-01-01

## 2022-01-01 RX ORDER — METRONIDAZOLE 500 MG/100ML
500 INJECTION, SOLUTION INTRAVENOUS EVERY 8 HOURS
Status: DISCONTINUED | OUTPATIENT
Start: 2022-01-01 | End: 2022-01-01

## 2022-01-01 RX ORDER — LORAZEPAM 0.5 MG/1
0.5 TABLET ORAL 2 TIMES DAILY
COMMUNITY

## 2022-01-01 RX ORDER — DOCUSATE SODIUM 100 MG/1
100 CAPSULE, LIQUID FILLED ORAL 2 TIMES DAILY PRN
Refills: 0
Start: 2022-01-01

## 2022-01-01 RX ORDER — KETOCONAZOLE 20 MG/G
CREAM TOPICAL DAILY
Qty: 60 G | Refills: 0 | Status: SHIPPED | OUTPATIENT
Start: 2022-01-01

## 2022-01-01 RX ORDER — ONDANSETRON 2 MG/ML
4 INJECTION INTRAMUSCULAR; INTRAVENOUS EVERY 6 HOURS PRN
Status: DISCONTINUED | OUTPATIENT
Start: 2022-01-01 | End: 2022-01-01 | Stop reason: HOSPADM

## 2022-01-01 RX ORDER — MELATONIN
1000 DAILY
Status: DISCONTINUED | OUTPATIENT
Start: 2022-01-01 | End: 2022-01-01 | Stop reason: HOSPADM

## 2022-01-01 RX ORDER — FUROSEMIDE 20 MG/1
20 TABLET ORAL DAILY PRN
Status: DISCONTINUED | OUTPATIENT
Start: 2022-01-01 | End: 2022-01-01 | Stop reason: HOSPADM

## 2022-01-01 RX ORDER — LISINOPRIL 20 MG/1
20 TABLET ORAL DAILY
Status: DISCONTINUED | OUTPATIENT
Start: 2022-01-01 | End: 2022-01-01 | Stop reason: HOSPADM

## 2022-01-01 RX ORDER — DEXAMETHASONE 2 MG/1
TABLET ORAL
Refills: 0
Start: 2022-01-01 | End: 2022-01-01

## 2022-01-01 RX ORDER — METOPROLOL SUCCINATE 200 MG/1
100 TABLET, EXTENDED RELEASE ORAL 3 TIMES DAILY
Qty: 135 TABLET | Refills: 3 | Status: SHIPPED | OUTPATIENT
Start: 2022-01-01 | End: 2022-01-01

## 2022-01-01 RX ORDER — PANTOPRAZOLE SODIUM 40 MG/10ML
40 INJECTION, POWDER, LYOPHILIZED, FOR SOLUTION INTRAVENOUS ONCE
Status: COMPLETED | OUTPATIENT
Start: 2022-01-01 | End: 2022-01-01

## 2022-01-01 RX ORDER — FAMOTIDINE 20 MG/1
20 TABLET, FILM COATED ORAL 2 TIMES DAILY
Qty: 180 TABLET | Refills: 1 | Status: SHIPPED | OUTPATIENT
Start: 2022-01-01

## 2022-01-01 RX ORDER — ALLOPURINOL 100 MG/1
100 TABLET ORAL DAILY
Qty: 90 TABLET | Refills: 0 | Status: SHIPPED | OUTPATIENT
Start: 2022-01-01 | End: 2022-01-01 | Stop reason: SDUPTHER

## 2022-01-01 RX ORDER — AMLODIPINE BESYLATE 10 MG/1
10 TABLET ORAL DAILY
Refills: 0
Start: 2022-01-01

## 2022-01-01 RX ORDER — ATORVASTATIN CALCIUM 40 MG/1
40 TABLET, FILM COATED ORAL EVERY EVENING
Refills: 0
Start: 2022-01-01

## 2022-01-01 RX ORDER — CHOLECALCIFEROL (VITAMIN D3) 125 MCG
100 CAPSULE ORAL DAILY
Status: DISCONTINUED | OUTPATIENT
Start: 2022-01-01 | End: 2022-01-01 | Stop reason: HOSPADM

## 2022-01-01 RX ORDER — BUSPIRONE HYDROCHLORIDE 10 MG/1
10 TABLET ORAL 3 TIMES DAILY
Qty: 270 TABLET | Refills: 0 | Status: SHIPPED | OUTPATIENT
Start: 2022-01-01

## 2022-01-01 RX ORDER — ALLOPURINOL 100 MG/1
100 TABLET ORAL DAILY
Status: DISCONTINUED | OUTPATIENT
Start: 2022-01-01 | End: 2022-01-01 | Stop reason: HOSPADM

## 2022-01-01 RX ORDER — LORATADINE 10 MG/1
10 TABLET ORAL DAILY
Status: DISCONTINUED | OUTPATIENT
Start: 2022-01-01 | End: 2022-01-01 | Stop reason: HOSPADM

## 2022-01-01 RX ORDER — HEPARIN SODIUM 5000 [USP'U]/ML
5000 INJECTION, SOLUTION INTRAVENOUS; SUBCUTANEOUS EVERY 8 HOURS SCHEDULED
Status: DISCONTINUED | OUTPATIENT
Start: 2022-01-01 | End: 2022-01-01

## 2022-01-01 RX ADMIN — CARVEDILOL 12.5 MG: 12.5 TABLET, FILM COATED ORAL at 07:49

## 2022-01-01 RX ADMIN — CARVEDILOL 12.5 MG: 12.5 TABLET, FILM COATED ORAL at 17:12

## 2022-01-01 RX ADMIN — METRONIDAZOLE 500 MG: 500 INJECTION, SOLUTION INTRAVENOUS at 16:45

## 2022-01-01 RX ADMIN — ATORVASTATIN CALCIUM 40 MG: 40 TABLET, FILM COATED ORAL at 18:04

## 2022-01-01 RX ADMIN — HEPARIN SODIUM 5000 UNITS: 5000 INJECTION INTRAVENOUS; SUBCUTANEOUS at 05:22

## 2022-01-01 RX ADMIN — ALLOPURINOL 100 MG: 100 TABLET ORAL at 07:48

## 2022-01-01 RX ADMIN — METRONIDAZOLE 500 MG: 500 INJECTION, SOLUTION INTRAVENOUS at 17:44

## 2022-01-01 RX ADMIN — Medication 100 MG: at 09:02

## 2022-01-01 RX ADMIN — METRONIDAZOLE 500 MG: 500 INJECTION, SOLUTION INTRAVENOUS at 07:45

## 2022-01-01 RX ADMIN — ALLOPURINOL 100 MG: 100 TABLET ORAL at 10:27

## 2022-01-01 RX ADMIN — Medication 1000 UNITS: at 10:27

## 2022-01-01 RX ADMIN — LORAZEPAM 0.5 MG: 0.5 TABLET ORAL at 09:48

## 2022-01-01 RX ADMIN — LORAZEPAM 0.5 MG: 0.5 TABLET ORAL at 10:27

## 2022-01-01 RX ADMIN — LEVOTHYROXINE SODIUM 50 MCG: 50 TABLET ORAL at 05:22

## 2022-01-01 RX ADMIN — CARVEDILOL 25 MG: 25 TABLET, FILM COATED ORAL at 16:44

## 2022-01-01 RX ADMIN — CYANOCOBALAMIN TAB 500 MCG 1000 MCG: 500 TAB at 10:27

## 2022-01-01 RX ADMIN — LORAZEPAM 0.5 MG: 0.5 TABLET ORAL at 21:34

## 2022-01-01 RX ADMIN — Medication 100 MG: at 09:08

## 2022-01-01 RX ADMIN — CARVEDILOL 12.5 MG: 12.5 TABLET, FILM COATED ORAL at 10:26

## 2022-01-01 RX ADMIN — DEXAMETHASONE SODIUM PHOSPHATE 4 MG: 4 INJECTION INTRA-ARTICULAR; INTRALESIONAL; INTRAMUSCULAR; INTRAVENOUS; SOFT TISSUE at 22:05

## 2022-01-01 RX ADMIN — LISINOPRIL 20 MG: 20 TABLET ORAL at 10:27

## 2022-01-01 RX ADMIN — CARVEDILOL 25 MG: 25 TABLET, FILM COATED ORAL at 08:49

## 2022-01-01 RX ADMIN — LISINOPRIL 20 MG: 20 TABLET ORAL at 09:08

## 2022-01-01 RX ADMIN — FAMOTIDINE 20 MG: 20 TABLET ORAL at 09:08

## 2022-01-01 RX ADMIN — MULTIPLE VITAMINS W/ MINERALS TAB 1 TABLET: TAB ORAL at 10:27

## 2022-01-01 RX ADMIN — HEPARIN SODIUM 5000 UNITS: 5000 INJECTION INTRAVENOUS; SUBCUTANEOUS at 14:41

## 2022-01-01 RX ADMIN — CEFTRIAXONE 1000 MG: 1 INJECTION, POWDER, FOR SOLUTION INTRAMUSCULAR; INTRAVENOUS at 08:57

## 2022-01-01 RX ADMIN — Medication 1000 UNITS: at 08:50

## 2022-01-01 RX ADMIN — BUSPIRONE HYDROCHLORIDE 10 MG: 10 TABLET ORAL at 08:50

## 2022-01-01 RX ADMIN — LORAZEPAM 0.5 MG: 0.5 TABLET ORAL at 09:05

## 2022-01-01 RX ADMIN — MAGNESIUM OXIDE TAB 400 MG (241.3 MG ELEMENTAL MG) 400 MG: 400 (241.3 MG) TAB at 09:08

## 2022-01-01 RX ADMIN — FUROSEMIDE 40 MG: 10 INJECTION, SOLUTION INTRAMUSCULAR; INTRAVENOUS at 22:35

## 2022-01-01 RX ADMIN — FAMOTIDINE 20 MG: 20 TABLET ORAL at 10:27

## 2022-01-01 RX ADMIN — PANTOPRAZOLE SODIUM 40 MG: 40 INJECTION, POWDER, FOR SOLUTION INTRAVENOUS at 22:36

## 2022-01-01 RX ADMIN — LEVOTHYROXINE SODIUM 50 MCG: 50 TABLET ORAL at 05:36

## 2022-01-01 RX ADMIN — LEVOTHYROXINE SODIUM 50 MCG: 50 TABLET ORAL at 05:04

## 2022-01-01 RX ADMIN — PANTOPRAZOLE SODIUM 40 MG: 40 TABLET, DELAYED RELEASE ORAL at 08:50

## 2022-01-01 RX ADMIN — ATORVASTATIN CALCIUM 40 MG: 40 TABLET, FILM COATED ORAL at 17:12

## 2022-01-01 RX ADMIN — MAGNESIUM OXIDE TAB 400 MG (241.3 MG ELEMENTAL MG) 400 MG: 400 (241.3 MG) TAB at 09:02

## 2022-01-01 RX ADMIN — CARVEDILOL 12.5 MG: 12.5 TABLET, FILM COATED ORAL at 18:04

## 2022-01-01 RX ADMIN — HYDRALAZINE HYDROCHLORIDE 10 MG: 20 INJECTION INTRAMUSCULAR; INTRAVENOUS at 21:05

## 2022-01-01 RX ADMIN — LORAZEPAM 0.5 MG: 0.5 TABLET ORAL at 21:01

## 2022-01-01 RX ADMIN — CEFTRIAXONE 1000 MG: 1 INJECTION, POWDER, FOR SOLUTION INTRAMUSCULAR; INTRAVENOUS at 09:07

## 2022-01-01 RX ADMIN — LORATADINE 10 MG: 10 TABLET ORAL at 09:03

## 2022-01-01 RX ADMIN — CARVEDILOL 25 MG: 25 TABLET, FILM COATED ORAL at 18:50

## 2022-01-01 RX ADMIN — BUSPIRONE HYDROCHLORIDE 10 MG: 10 TABLET ORAL at 18:50

## 2022-01-01 RX ADMIN — MULTIPLE VITAMINS W/ MINERALS TAB 1 TABLET: TAB ORAL at 09:03

## 2022-01-01 RX ADMIN — Medication 100 MG: at 09:48

## 2022-01-01 RX ADMIN — ASPIRIN 81 MG CHEWABLE TABLET 81 MG: 81 TABLET CHEWABLE at 09:07

## 2022-01-01 RX ADMIN — ATORVASTATIN CALCIUM 40 MG: 40 TABLET, FILM COATED ORAL at 18:50

## 2022-01-01 RX ADMIN — ATORVASTATIN CALCIUM 40 MG: 40 TABLET, FILM COATED ORAL at 17:05

## 2022-01-01 RX ADMIN — METRONIDAZOLE 500 MG: 500 INJECTION, SOLUTION INTRAVENOUS at 02:53

## 2022-01-01 RX ADMIN — Medication 100 MG: at 08:49

## 2022-01-01 RX ADMIN — METRONIDAZOLE 500 MG: 500 INJECTION, SOLUTION INTRAVENOUS at 18:51

## 2022-01-01 RX ADMIN — MULTIPLE VITAMINS W/ MINERALS TAB 1 TABLET: TAB ORAL at 08:50

## 2022-01-01 RX ADMIN — DEXAMETHASONE SODIUM PHOSPHATE 4 MG: 4 INJECTION INTRA-ARTICULAR; INTRALESIONAL; INTRAMUSCULAR; INTRAVENOUS; SOFT TISSUE at 21:01

## 2022-01-01 RX ADMIN — FAMOTIDINE 20 MG: 20 TABLET ORAL at 08:50

## 2022-01-01 RX ADMIN — CARVEDILOL 12.5 MG: 12.5 TABLET, FILM COATED ORAL at 11:11

## 2022-01-01 RX ADMIN — ALLOPURINOL 100 MG: 100 TABLET ORAL at 09:08

## 2022-01-01 RX ADMIN — METRONIDAZOLE 500 MG: 500 INJECTION, SOLUTION INTRAVENOUS at 18:02

## 2022-01-01 RX ADMIN — LORAZEPAM 0.5 MG: 0.5 TABLET ORAL at 07:50

## 2022-01-01 RX ADMIN — FAMOTIDINE 20 MG: 20 TABLET ORAL at 09:02

## 2022-01-01 RX ADMIN — CYANOCOBALAMIN TAB 500 MCG 1000 MCG: 500 TAB at 09:08

## 2022-01-01 RX ADMIN — LORATADINE 10 MG: 10 TABLET ORAL at 07:48

## 2022-01-01 RX ADMIN — PANTOPRAZOLE SODIUM 40 MG: 40 TABLET, DELAYED RELEASE ORAL at 09:08

## 2022-01-01 RX ADMIN — BUSPIRONE HYDROCHLORIDE 10 MG: 10 TABLET ORAL at 09:48

## 2022-01-01 RX ADMIN — BUSPIRONE HYDROCHLORIDE 10 MG: 10 TABLET ORAL at 17:14

## 2022-01-01 RX ADMIN — CARVEDILOL 25 MG: 25 TABLET, FILM COATED ORAL at 17:44

## 2022-01-01 RX ADMIN — FAMOTIDINE 20 MG: 20 TABLET ORAL at 09:48

## 2022-01-01 RX ADMIN — CYANOCOBALAMIN TAB 500 MCG 1000 MCG: 500 TAB at 09:02

## 2022-01-01 RX ADMIN — LISINOPRIL 20 MG: 20 TABLET ORAL at 09:48

## 2022-01-01 RX ADMIN — BUSPIRONE HYDROCHLORIDE 10 MG: 10 TABLET ORAL at 10:27

## 2022-01-01 RX ADMIN — METRONIDAZOLE 500 MG: 500 INJECTION, SOLUTION INTRAVENOUS at 23:48

## 2022-01-01 RX ADMIN — METRONIDAZOLE 500 MG: 500 INJECTION, SOLUTION INTRAVENOUS at 00:16

## 2022-01-01 RX ADMIN — BUSPIRONE HYDROCHLORIDE 10 MG: 10 TABLET ORAL at 09:03

## 2022-01-01 RX ADMIN — BUSPIRONE HYDROCHLORIDE 10 MG: 10 TABLET ORAL at 21:38

## 2022-01-01 RX ADMIN — MAGNESIUM OXIDE TAB 400 MG (241.3 MG ELEMENTAL MG) 400 MG: 400 (241.3 MG) TAB at 07:46

## 2022-01-01 RX ADMIN — ATORVASTATIN CALCIUM 40 MG: 40 TABLET, FILM COATED ORAL at 17:44

## 2022-01-01 RX ADMIN — LORAZEPAM 0.5 MG: 0.5 TABLET ORAL at 08:50

## 2022-01-01 RX ADMIN — DEXAMETHASONE SODIUM PHOSPHATE 4 MG: 4 INJECTION INTRA-ARTICULAR; INTRALESIONAL; INTRAMUSCULAR; INTRAVENOUS; SOFT TISSUE at 05:04

## 2022-01-01 RX ADMIN — CARVEDILOL 12.5 MG: 12.5 TABLET, FILM COATED ORAL at 09:09

## 2022-01-01 RX ADMIN — MULTIPLE VITAMINS W/ MINERALS TAB 1 TABLET: TAB ORAL at 07:45

## 2022-01-01 RX ADMIN — BUSPIRONE HYDROCHLORIDE 10 MG: 10 TABLET ORAL at 09:08

## 2022-01-01 RX ADMIN — DEXAMETHASONE SODIUM PHOSPHATE 4 MG: 4 INJECTION INTRA-ARTICULAR; INTRALESIONAL; INTRAMUSCULAR; INTRAVENOUS; SOFT TISSUE at 16:44

## 2022-01-01 RX ADMIN — ASPIRIN 81 MG CHEWABLE TABLET 81 MG: 81 TABLET CHEWABLE at 09:03

## 2022-01-01 RX ADMIN — PROMETHAZINE HYDROCHLORIDE 12.5 MG: 25 INJECTION INTRAMUSCULAR; INTRAVENOUS at 23:14

## 2022-01-01 RX ADMIN — CEFTRIAXONE 1000 MG: 1 INJECTION, POWDER, FOR SOLUTION INTRAMUSCULAR; INTRAVENOUS at 08:36

## 2022-01-01 RX ADMIN — HEPARIN SODIUM 5000 UNITS: 5000 INJECTION INTRAVENOUS; SUBCUTANEOUS at 17:44

## 2022-01-01 RX ADMIN — LORAZEPAM 0.5 MG: 0.5 TABLET ORAL at 09:08

## 2022-01-01 RX ADMIN — GADOBUTROL 10 ML: 604.72 INJECTION INTRAVENOUS at 17:34

## 2022-01-01 RX ADMIN — LISINOPRIL 20 MG: 20 TABLET ORAL at 09:02

## 2022-01-01 RX ADMIN — LISINOPRIL 20 MG: 20 TABLET ORAL at 07:49

## 2022-01-01 RX ADMIN — ATORVASTATIN CALCIUM 40 MG: 40 TABLET, FILM COATED ORAL at 17:20

## 2022-01-01 RX ADMIN — ONDANSETRON 4 MG: 2 INJECTION INTRAMUSCULAR; INTRAVENOUS at 22:08

## 2022-01-01 RX ADMIN — METRONIDAZOLE 500 MG: 500 INJECTION, SOLUTION INTRAVENOUS at 00:18

## 2022-01-01 RX ADMIN — AMLODIPINE BESYLATE 10 MG: 10 TABLET ORAL at 09:49

## 2022-01-01 RX ADMIN — CARVEDILOL 25 MG: 25 TABLET, FILM COATED ORAL at 09:11

## 2022-01-01 RX ADMIN — PANTOPRAZOLE SODIUM 40 MG: 40 TABLET, DELAYED RELEASE ORAL at 09:03

## 2022-01-01 RX ADMIN — DEXAMETHASONE SODIUM PHOSPHATE 4 MG: 4 INJECTION INTRA-ARTICULAR; INTRALESIONAL; INTRAMUSCULAR; INTRAVENOUS; SOFT TISSUE at 13:46

## 2022-01-01 RX ADMIN — Medication 1000 UNITS: at 07:47

## 2022-01-01 RX ADMIN — METRONIDAZOLE 500 MG: 500 INJECTION, SOLUTION INTRAVENOUS at 11:57

## 2022-01-01 RX ADMIN — LABETALOL HYDROCHLORIDE 10 MG: 5 INJECTION INTRAVENOUS at 03:43

## 2022-01-01 RX ADMIN — PROMETHAZINE HYDROCHLORIDE 12.5 MG: 25 INJECTION INTRAMUSCULAR; INTRAVENOUS at 03:32

## 2022-01-01 RX ADMIN — BUSPIRONE HYDROCHLORIDE 10 MG: 10 TABLET ORAL at 23:50

## 2022-01-01 RX ADMIN — LORATADINE 10 MG: 10 TABLET ORAL at 09:48

## 2022-01-01 RX ADMIN — CYANOCOBALAMIN TAB 500 MCG 1000 MCG: 500 TAB at 09:47

## 2022-01-01 RX ADMIN — LORAZEPAM 0.5 MG: 0.5 TABLET ORAL at 21:09

## 2022-01-01 RX ADMIN — PERFLUTREN 0.4 ML/MIN: 6.52 INJECTION, SUSPENSION INTRAVENOUS at 11:35

## 2022-01-01 RX ADMIN — CYANOCOBALAMIN TAB 500 MCG 1000 MCG: 500 TAB at 08:49

## 2022-01-01 RX ADMIN — BUSPIRONE HYDROCHLORIDE 10 MG: 10 TABLET ORAL at 16:44

## 2022-01-01 RX ADMIN — ASPIRIN 81 MG CHEWABLE TABLET 81 MG: 81 TABLET CHEWABLE at 08:50

## 2022-01-01 RX ADMIN — PANTOPRAZOLE SODIUM 40 MG: 40 TABLET, DELAYED RELEASE ORAL at 09:48

## 2022-01-01 RX ADMIN — BUSPIRONE HYDROCHLORIDE 10 MG: 10 TABLET ORAL at 07:48

## 2022-01-01 RX ADMIN — BUSPIRONE HYDROCHLORIDE 10 MG: 10 TABLET ORAL at 23:43

## 2022-01-01 RX ADMIN — AMLODIPINE BESYLATE 5 MG: 5 TABLET ORAL at 08:50

## 2022-01-01 RX ADMIN — METRONIDAZOLE 500 MG: 500 INJECTION, SOLUTION INTRAVENOUS at 09:40

## 2022-01-01 RX ADMIN — LORATADINE 10 MG: 10 TABLET ORAL at 09:08

## 2022-01-01 RX ADMIN — Medication 100 MG: at 10:27

## 2022-01-01 RX ADMIN — ALLOPURINOL 100 MG: 100 TABLET ORAL at 08:50

## 2022-01-01 RX ADMIN — IOHEXOL 75 ML: 350 INJECTION, SOLUTION INTRAVENOUS at 18:18

## 2022-01-01 RX ADMIN — MULTIPLE VITAMINS W/ MINERALS TAB 1 TABLET: TAB ORAL at 09:48

## 2022-01-01 RX ADMIN — ONDANSETRON 4 MG: 2 INJECTION INTRAMUSCULAR; INTRAVENOUS at 11:11

## 2022-01-01 RX ADMIN — LORATADINE 10 MG: 10 TABLET ORAL at 10:27

## 2022-01-01 RX ADMIN — MAGNESIUM OXIDE TAB 400 MG (241.3 MG ELEMENTAL MG) 400 MG: 400 (241.3 MG) TAB at 08:50

## 2022-01-01 RX ADMIN — METRONIDAZOLE 500 MG: 500 INJECTION, SOLUTION INTRAVENOUS at 23:50

## 2022-01-01 RX ADMIN — ALLOPURINOL 100 MG: 100 TABLET ORAL at 09:48

## 2022-01-01 RX ADMIN — PANTOPRAZOLE SODIUM 40 MG: 40 TABLET, DELAYED RELEASE ORAL at 07:49

## 2022-01-01 RX ADMIN — LEVOTHYROXINE SODIUM 50 MCG: 50 TABLET ORAL at 05:01

## 2022-01-01 RX ADMIN — METRONIDAZOLE 500 MG: 500 INJECTION, SOLUTION INTRAVENOUS at 10:17

## 2022-01-01 RX ADMIN — LISINOPRIL 20 MG: 20 TABLET ORAL at 08:50

## 2022-01-01 RX ADMIN — MAGNESIUM OXIDE TAB 400 MG (241.3 MG ELEMENTAL MG) 400 MG: 400 (241.3 MG) TAB at 09:48

## 2022-01-01 RX ADMIN — CEFTRIAXONE 1000 MG: 1 INJECTION, POWDER, FOR SOLUTION INTRAMUSCULAR; INTRAVENOUS at 09:24

## 2022-01-01 RX ADMIN — Medication 1000 UNITS: at 09:02

## 2022-01-01 RX ADMIN — CEFTRIAXONE 1000 MG: 1 INJECTION, POWDER, FOR SOLUTION INTRAMUSCULAR; INTRAVENOUS at 08:54

## 2022-01-01 RX ADMIN — LORAZEPAM 0.5 MG: 0.5 TABLET ORAL at 21:38

## 2022-01-01 RX ADMIN — ALLOPURINOL 100 MG: 100 TABLET ORAL at 09:03

## 2022-01-01 RX ADMIN — MULTIPLE VITAMINS W/ MINERALS TAB 1 TABLET: TAB ORAL at 09:08

## 2022-01-01 RX ADMIN — DEXAMETHASONE SODIUM PHOSPHATE 4 MG: 4 INJECTION INTRA-ARTICULAR; INTRALESIONAL; INTRAMUSCULAR; INTRAVENOUS; SOFT TISSUE at 05:22

## 2022-01-01 RX ADMIN — ASPIRIN 81 MG CHEWABLE TABLET 81 MG: 81 TABLET CHEWABLE at 09:48

## 2022-01-01 RX ADMIN — ONDANSETRON 4 MG: 2 INJECTION INTRAMUSCULAR; INTRAVENOUS at 13:36

## 2022-01-01 RX ADMIN — FAMOTIDINE 20 MG: 20 TABLET ORAL at 07:46

## 2022-01-01 RX ADMIN — Medication 1000 UNITS: at 09:49

## 2022-01-01 RX ADMIN — LORATADINE 10 MG: 10 TABLET ORAL at 08:50

## 2022-01-01 RX ADMIN — AMLODIPINE BESYLATE 10 MG: 10 TABLET ORAL at 09:03

## 2022-01-01 RX ADMIN — DEXAMETHASONE SODIUM PHOSPHATE 4 MG: 4 INJECTION INTRA-ARTICULAR; INTRALESIONAL; INTRAMUSCULAR; INTRAVENOUS; SOFT TISSUE at 14:41

## 2022-01-01 RX ADMIN — METRONIDAZOLE 500 MG: 500 INJECTION, SOLUTION INTRAVENOUS at 10:27

## 2022-01-01 RX ADMIN — MAGNESIUM OXIDE TAB 400 MG (241.3 MG ELEMENTAL MG) 400 MG: 400 (241.3 MG) TAB at 10:31

## 2022-01-01 RX ADMIN — BUSPIRONE HYDROCHLORIDE 10 MG: 10 TABLET ORAL at 17:44

## 2022-01-01 RX ADMIN — CYANOCOBALAMIN TAB 500 MCG 1000 MCG: 500 TAB at 07:46

## 2022-01-01 RX ADMIN — METRONIDAZOLE 500 MG: 500 INJECTION, SOLUTION INTRAVENOUS at 17:12

## 2022-01-01 RX ADMIN — CARVEDILOL 25 MG: 25 TABLET, FILM COATED ORAL at 17:20

## 2022-01-01 RX ADMIN — ASPIRIN 81 MG CHEWABLE TABLET 81 MG: 81 TABLET CHEWABLE at 13:36

## 2022-01-01 RX ADMIN — CARVEDILOL 25 MG: 25 TABLET, FILM COATED ORAL at 09:49

## 2022-01-01 RX ADMIN — METRONIDAZOLE 500 MG: 500 INJECTION, SOLUTION INTRAVENOUS at 08:50

## 2022-01-01 RX ADMIN — BUSPIRONE HYDROCHLORIDE 10 MG: 10 TABLET ORAL at 21:01

## 2022-01-01 RX ADMIN — DEXAMETHASONE SODIUM PHOSPHATE 4 MG: 4 INJECTION INTRA-ARTICULAR; INTRALESIONAL; INTRAMUSCULAR; INTRAVENOUS; SOFT TISSUE at 21:38

## 2022-01-01 RX ADMIN — Medication 1000 UNITS: at 09:08

## 2022-01-01 RX ADMIN — ONDANSETRON 4 MG: 2 INJECTION INTRAMUSCULAR; INTRAVENOUS at 17:04

## 2022-01-01 RX ADMIN — BUSPIRONE HYDROCHLORIDE 10 MG: 10 TABLET ORAL at 18:04

## 2022-01-01 RX ADMIN — CEFTRIAXONE 1000 MG: 1 INJECTION, POWDER, FOR SOLUTION INTRAMUSCULAR; INTRAVENOUS at 11:52

## 2022-01-01 RX ADMIN — PANTOPRAZOLE SODIUM 40 MG: 40 TABLET, DELAYED RELEASE ORAL at 10:30

## 2022-02-08 NOTE — TELEPHONE ENCOUNTER
Pt's daughter called and stated She has no refills remaining on the following medications  If we could send in new prescription  She also stated the Pt will be coming home in about a week or so

## 2022-02-08 NOTE — TELEPHONE ENCOUNTER
I called and spoke with the Pt's daughter and made her aware, she verbalized understanding and agreement   She would like to know what they can use in place of the Meloxicam?

## 2022-02-08 NOTE — TELEPHONE ENCOUNTER
I am a little bit worried about refilling the meloxicam on a regular basis as there is some contraindication with the patient's age as well as side effects on kidney function  We have not done labs since June because she is obviously in [place]  But again I would rather use something other than NSAID class for pain

## 2022-04-29 NOTE — TELEPHONE ENCOUNTER
Pt's daughter called and stated her mom is home and is need of a new prescription for her Allopurinol and her Buspirone

## 2022-05-06 NOTE — TELEPHONE ENCOUNTER
The these auditory and visual hallucinations have been going on for quite sometimes  Our approach right now should be possibly:     1  discontinuing the Seroquel/quetiapine at bedtime  2  Discontinuing the buspirone evening does     At least doing this short-term to see if it makes any difference in the hallucinations  I do know that she has had some long-term difficulties with actually falling asleep  How has her insomnia been?

## 2022-05-06 NOTE — TELEPHONE ENCOUNTER
Mackenzie Osborn pt's son in law called the office to make you aware that pt is not sleeping well the patient has been hallucinating for at least 1 month  Pt see's people  children, ralph  Pt did not recognize Mackenzie Dawning this morning  She usually remembers  him more than her daughter Aamir Polanco  Not sure if it is from her medications  Pt's son in law reports the memory issues  for 1 month  Pt is having sob, but this is normal for her per Mackenzie Catching  Pt has no active chest pain  No left arm pain  Possible weakness or numbness due to age per conversation  with Mackenzie Osborn  No fever no flu like symptoms  everything else is basically normal  Pt is disoriented today  ,but has been happening before in last month  Per with Mackenzie Dawning she lives in [facility] with him and in [facility]  Please call Elinor Brandon   or Aamir Sherri pt's daughter [phone]    Bishnu Singh I was trying to schedule next available  appointment ,but no availability til end of June wanted to see hat you advise

## 2022-05-09 NOTE — TELEPHONE ENCOUNTER
I called Stephanie's daughter and son in law  They are aware of your message and recommendations  They expressed verbal understanding  Pt's Seroquel/quetiapine has been stopped some time ago  Pt has not been given this  They cancelled refill with pharmacy  So pt does fall asleep, but wakes up very early and easily  Pt sleeps a lot during the day per verbal conversation with Kurt Herman  He believes patient may have days and night's mixed up

## 2022-05-10 NOTE — TELEPHONE ENCOUNTER
Pt's daughter gave the office a call back, I made her aware  She verbalized understanding and agreement

## 2022-05-10 NOTE — TELEPHONE ENCOUNTER
Buspirone should NOT be causing the issue BUT maybe they can try to wean off of this over the next week or so and see it it makes a difference

## 2022-06-02 NOTE — TELEPHONE ENCOUNTER
Pt daughter called back and stated she also needs a new script for the Ketoconazole cream, she is almost out and has no refills remaining [facility]

## 2022-06-24 PROBLEM — R42 DIZZINESS: Status: ACTIVE | Noted: 2022-01-01

## 2022-06-24 PROBLEM — G93.89 BRAIN MASS: Status: ACTIVE | Noted: 2022-01-01

## 2022-06-24 PROBLEM — D69.6 THROMBOCYTOPENIA (HCC): Status: ACTIVE | Noted: 2022-01-01

## 2022-06-24 NOTE — ED PROVIDER NOTES
History  No chief complaint on file  HPI  Peace Regan is a 80 y o  female with PMH significant for HLD, breast cancer coming in today with complaint of altered mental status  Per EMS, the patient was last known well was sometime last evening  Upon awakening (unknown time?), the family reports that she was off  She has been complaining of nausea  EMS was then called for evaluation  They reported some very slight facial droop, however appreciated no other deficits  She was stable on route  BS in 170s  On arrival, the patient is alert, oriented and able to answer questions  She denies any chest pain, headache, vision changes  She reports she is nauseous  She denies any weakness  She denies any abdominal pain  Remainder of history limited at this time       - No language barrier    - History obtained from patient and chart   - Reviewed and documented relevant past medical/family/social history  - There are limitations to the history obtained 2/2 age and hearing  - Previous charting was reviewed  Some data reviewed included below for ease of access whether or not it is relevant to this patient encounter  Prior to Admission Medications   Prescriptions Last Dose Informant Patient Reported? Taking?    Cholecalciferol (D3-1000) 1000 units tablet   Yes No   Sig: Take 1,000 Units by mouth daily   Coenzyme Q10 (CO Q 10) 100 MG CAPS   Yes No   Sig: Take by mouth   LORazepam (ATIVAN) 0 5 mg tablet   Yes Yes   Sig: Take 0 5 mg by mouth 2 (two) times a day   Magnesium 100 MG TABS  Family Member Yes No   Sig: Take by mouth   Multiple Vitamins-Minerals (PRESERVISION AREDS) CAPS  Family Member Yes No   Sig: Take by mouth   allopurinol (ZYLOPRIM) 100 mg tablet   No No   Sig: Take 1 tablet (100 mg total) by mouth daily   aspirin 81 mg chewable tablet  Family Member Yes No   Sig: Chew 1 tablet daily   busPIRone (BUSPAR) 10 mg tablet   No No   Sig: Take 1 tablet (10 mg total) by mouth 3 (three) times a day cetirizine (ZyrTEC) 10 mg tablet   Yes No   Sig: Take 10 mg by mouth daily   cyanocobalamin (VITAMIN B-12) 1,000 mcg tablet  Family Member Yes No   Sig: Take by mouth   famotidine (PEPCID) 20 mg tablet   No No   Sig: Take 1 tablet (20 mg total) by mouth 2 (two) times a day   furosemide (LASIX) 20 mg tablet   No No   Sig: TAKE 1 TABLET(20 MG) BY MOUTH DAILY AS NEEDED FOR SWELLING   ketoconazole (NIZORAL) 2 % cream   No No   Sig: Apply topically daily   levothyroxine 50 mcg tablet   No No   Sig: Take 1 tablet (50 mcg total) by mouth daily   lisinopril (ZESTRIL) 40 mg tablet   No No   Sig: Take 0 5 tablets (20 mg total) by mouth daily   meloxicam (MOBIC) 7 5 mg tablet   No No   Sig: TAKE 1 TABLET(7 5 MG) BY MOUTH DAILY   metoprolol succinate (TOPROL-XL) 200 MG 24 hr tablet   No No   Sig: Take 0 5 tablets (100 mg total) by mouth 3 (three) times a day   pantoprazole (PROTONIX) 40 mg tablet   No No   Sig: Take 1 tablet (40 mg total) by mouth daily      Facility-Administered Medications: None       Past Medical History:   Diagnosis Date    Arthritis     Malignant neoplasm of breast ([facility] [address] )     Resolved: Oct 26, 2015    Sciatica     last assessed: July 2, 2013       Past Surgical History:   Procedure Laterality Date    BREAST BIOPSY      BREAST LUMPECTOMY      CARDIAC SURGERY      CATARACT EXTRACTION      CORONARY ARTERY BYPASS GRAFT  2009    ROTATOR CUFF REPAIR      TONSILLECTOMY      TOTAL ABDOMINAL HYSTERECTOMY         Family History   Problem Relation Age of Onset    Other Mother         Maternal coagulation defect complicating pregnancy/childbirth/puerperium     Emphysema Father     Other Sister         Pacemaker Placement     Coronary artery disease Brother     Melanoma Daughter      I have reviewed and agree with the history as documented      E-Cigarette/Vaping     E-Cigarette/Vaping Substances     Social History     Tobacco Use    Smoking status: Former Smoker     Types: Cigarettes     Quit date: 2000     Years since quittin 4    Smokeless tobacco: Never Used   Substance Use Topics    Alcohol use: Yes     Comment: social     Drug use: No        Review of Systems   Unable to perform ROS: Age       Physical Exam  ED Triage Vitals   Temperature Pulse Respirations Blood Pressure SpO2   22 1923 22 1611 22 1611 22 1920 22 1611   97 5 °F (36 4 °C) 62 18 (!) 209/110 97 %      Temp Source Heart Rate Source Patient Position - Orthostatic VS BP Location FiO2 (%)   22 1923 22 1611 22 1908 22 1908 --   Oral Monitor Lying Left arm       Pain Score       22 190       No Pain             Orthostatic Vital Signs  Vitals:    22 2234 22 2302 22 2345 22 0128   BP: (!) 177/73 165/84 165/84 164/77   Pulse: 61 71 71 64   Patient Position - Orthostatic VS:   Sitting        Physical Exam  Vitals and nursing note reviewed  Constitutional:       General: She is not in acute distress  Appearance: She is well-developed  HENT:      Head: Normocephalic and atraumatic  Eyes:      Conjunctiva/sclera: Conjunctivae normal    Cardiovascular:      Rate and Rhythm: Normal rate and regular rhythm  Heart sounds: No murmur heard  Pulmonary:      Effort: Pulmonary effort is normal  No respiratory distress  Breath sounds: Normal breath sounds  Abdominal:      Palpations: Abdomen is soft  Tenderness: There is no abdominal tenderness  There is no guarding or rebound  Musculoskeletal:      Cervical back: Neck supple  Right lower leg: Edema present  Left lower leg: Edema present  Skin:     General: Skin is warm and dry  Neurological:      General: No focal deficit present  Mental Status: She is alert and oriented to person, place, and time  Cranial Nerves: No cranial nerve deficit  Motor: No weakness        Coordination: Coordination normal    Psychiatric:         Behavior: Behavior normal          ED Medications  Medications   hydrALAZINE (APRESOLINE) injection 10 mg (10 mg Intravenous Given 6/24/22 2105)   allopurinol (ZYLOPRIM) tablet 100 mg (has no administration in time range)   aspirin chewable tablet 81 mg (has no administration in time range)   busPIRone (BUSPAR) tablet 10 mg (10 mg Oral Not Given 6/24/22 2235)   loratadine (CLARITIN) tablet 10 mg (has no administration in time range)   cholecalciferol (VITAMIN D3) tablet 1,000 Units (has no administration in time range)   co-enzyme Q-10 capsule 100 mg (has no administration in time range)   cyanocobalamin (VITAMIN B-12) tablet 1,000 mcg (has no administration in time range)   famotidine (PEPCID) tablet 20 mg (has no administration in time range)   furosemide (LASIX) tablet 20 mg (has no administration in time range)   levothyroxine tablet 50 mcg (50 mcg Oral Refused 6/25/22 0729)   lisinopril (ZESTRIL) tablet 20 mg (has no administration in time range)   LORazepam (ATIVAN) tablet 0 5 mg (0 5 mg Oral Not Given 6/24/22 2234)   magnesium oxide (MAG-OX) tablet 400 mg (has no administration in time range)   metoprolol succinate (TOPROL-XL) 24 hr tablet 100 mg (100 mg Oral Not Given 6/24/22 2234)   multivitamin-minerals (CENTRUM) tablet 1 tablet (has no administration in time range)   pantoprazole (PROTONIX) EC tablet 40 mg (has no administration in time range)   acetaminophen (TYLENOL) tablet 650 mg (has no administration in time range)   docusate sodium (COLACE) capsule 100 mg (has no administration in time range)   ondansetron (ZOFRAN) injection 4 mg (4 mg Intravenous Given 6/24/22 2208)   aluminum-magnesium hydroxide-simethicone (MYLANTA) oral suspension 30 mL (has no administration in time range)   atorvastatin (LIPITOR) tablet 40 mg (has no administration in time range)   cefTRIAXone (ROCEPHIN) 1,000 mg in dextrose 5 % 50 mL IVPB (has no administration in time range)   metroNIDAZOLE (FLAGYL) IVPB (premix) 500 mg 100 mL (has no administration in time range)   ondansetron (ZOFRAN) injection 4 mg (4 mg Intravenous Given 6/24/22 1704)   iohexol (OMNIPAQUE) 350 MG/ML injection (MULTI-DOSE) 75 mL (75 mL Intravenous Given 6/24/22 1818)   furosemide (LASIX) injection 40 mg (40 mg Intravenous Given 6/24/22 2235)   pantoprazole (PROTONIX) injection 40 mg (40 mg Intravenous Given 6/24/22 2236)   promethazine (PHENERGAN) injection 12 5 mg (12 5 mg Intravenous Given 6/24/22 2314)   promethazine (PHENERGAN) injection 12 5 mg (12 5 mg Intravenous Given 6/25/22 0332)       Diagnostic Studies  Results Reviewed     Procedure Component Value Units Date/Time    Hemoglobin A1C [[id number]]     Lab Status: No result Specimen: Blood     TSH, 3rd generation [[id number]]     Lab Status: No result Specimen: Blood     Comprehensive metabolic panel [[id number]]     Lab Status: No result Specimen: Blood     Magnesium [[id number]]     Lab Status: No result Specimen: Blood     Phosphorus [[id number]]     Lab Status: No result Specimen: Blood     CBC and differential [[id number]]     Lab Status: No result Specimen: Blood     Protime-INR [[id number]]     Lab Status: No result Specimen: Blood     APTT [[id number]]     Lab Status: No result Specimen: Blood     Lipid panel [[id number]]     Lab Status: No result Specimen: Blood     Blood culture #1 [[id number]] Collected: 06/24/22 1654    Lab Status: Preliminary result Specimen: Blood from Line, Venous Updated: 06/24/22 2304     Blood Culture Received in Microbiology Lab  Culture in Progress  Blood culture #2 [[id number]] Collected: 06/24/22 1645    Lab Status: Preliminary result Specimen: Blood from Arm, Right Updated: 06/24/22 2304     Blood Culture Received in Microbiology Lab  Culture in Progress  Fibrin split products [[id number]]  (Normal) Collected: 06/24/22 1721    Lab Status: Final result Specimen: Blood from Arm, Right Updated: 06/24/22 2156     FDP <10    Narrative:      Test performed by semi-quantitative latex procedure        HS Troponin I 2hr [[id number]]  (Normal) Collected: 06/24/22 2023    Lab Status: Final result Specimen: Blood from Arm, Left Updated: 06/24/22 2115     hs TnI 2hr 7 ng/L      Delta 2hr hsTnI 0 ng/L     D-dimer, quantitative [[id number]]  (Abnormal) Collected: 06/24/22 1721    Lab Status: Final result Specimen: Blood from Arm, Right Updated: 06/24/22 2106     D-Dimer, Quant 1 90 ug/ml FEU     Narrative: In the evaluation for possible pulmonary embolism, in the appropriate (Well's Score of 4 or less) patient, the age adjusted d-dimer cutoff for this patient can be calculated as:    Age x 0 01 (in ug/mL) for Age-adjusted D-dimer exclusion threshold for a patient over 50 years  Fibrinogen [[id number]]  (Abnormal) Collected: 06/24/22 1721    Lab Status: Final result Specimen: Blood from Arm, Right Updated: 06/24/22 2106     Fibrinogen 512 mg/dL     Hemolysis Smear [[id number]] Collected: 06/24/22 2023    Lab Status: Final result Specimen: Arm, Left Updated: 06/24/22 2045     Hemolysis Smear No Schistocytes or Helmet Cells noted    Lactate dehydrogenase, isoenzymes [[id number]] Collected: 06/24/22 2023    Lab Status: In process Specimen: Blood from Arm, Left Updated: 06/24/22 2035    Haptoglobin [[id number]] Collected: 06/24/22 2023    Lab Status:  In process Specimen: Blood from Arm, Left Updated: 06/24/22 2035    POCT urinalysis dipstick [[id number]]  (Normal) Resulted: 06/24/22 2011    Lab Status: Final result Specimen: Urine Updated: 06/24/22 2012     Color, UA see results    Urine Microscopic [[id number]]  (Normal) Collected: 06/24/22 1918    Lab Status: Final result Specimen: Urine, Other Updated: 06/24/22 1941     RBC, UA None Seen /hpf      WBC, UA None Seen /hpf      Epithelial Cells Occasional /hpf      Bacteria, UA None Seen /hpf     Urine Macroscopic, POC [[id number]]  (Abnormal) Collected: 06/24/22 1918    Lab Status: Final result Specimen: Urine Updated: 06/24/22 1920     Color, UA Yellow     Clarity, UA Clear     pH, UA 7 0     Leukocytes, UA Negative     Nitrite, UA Negative     Protein, UA Trace mg/dl      Glucose, UA Negative mg/dl      Ketones, UA Negative mg/dl      Urobilinogen, UA 0 2 E U /dl      Bilirubin, UA Negative     Occult Blood, UA Negative     Specific Gravity, UA 1 015    Narrative:      CLINITEK RESULT    CBC and differential [[id number]]  (Abnormal) Collected: 06/24/22 1654    Lab Status: Final result Specimen: Blood from Arm, Left Updated: 06/24/22 1914     WBC 5 27 Thousand/uL      RBC 3 24 Million/uL      Hemoglobin 9 9 g/dL      Hematocrit 30 8 %      MCV 95 fL      MCH 30 6 pg      MCHC 32 1 g/dL      RDW 13 8 %      MPV 13 7 fL      Platelets 22 Thousands/uL      nRBC 0 /100 WBCs      Neutrophils Relative 68 %      Immat GRANS % 0 %      Lymphocytes Relative 23 %      Monocytes Relative 9 %      Eosinophils Relative 0 %      Basophils Relative 0 %      Neutrophils Absolute 3 50 Thousands/µL      Immature Grans Absolute 0 02 Thousand/uL      Lymphocytes Absolute 1 23 Thousands/µL      Monocytes Absolute 0 49 Thousand/µL      Eosinophils Absolute 0 02 Thousand/µL      Basophils Absolute 0 01 Thousands/µL     HS Troponin I 4hr [[id number]]     Lab Status: No result Specimen: Blood     Lactic acid [[id number]]  (Normal) Collected: 06/24/22 1737    Lab Status: Final result Specimen: Blood from Arm, Left Updated: 06/24/22 1814     LACTIC ACID 1 9 mmol/L     Narrative:      Result may be elevated if tourniquet was used during collection      Sherin David [[id number]]  (Normal) Collected: 06/24/22 1721    Lab Status: Final result Specimen: Blood from Arm, Right Updated: 06/24/22 1800     Protime 12 8 seconds      INR 1 00    APTT [[id number]]  (Normal) Collected: 06/24/22 1721    Lab Status: Final result Specimen: Blood from Arm, Right Updated: 06/24/22 1800     PTT 26 seconds     Procalcitonin [[id number]]  (Normal) Collected: 06/24/22 1654    Lab Status: Final result Specimen: Blood from Arm, Left Updated: 06/24/22 1737 Procalcitonin 0 06 ng/ml     HS Troponin 0hr (reflex protocol) [[id number]]  (Normal) Collected: 06/24/22 1649    Lab Status: Final result Specimen: Blood from Arm, Right Updated: 06/24/22 1734     hs TnI 0hr 7 ng/L     Comprehensive metabolic panel [[id number]]  (Abnormal) Collected: 06/24/22 1645    Lab Status: Final result Specimen: Blood from Arm, Right Updated: 06/24/22 1721     Sodium 136 mmol/L      Potassium 4 0 mmol/L      Chloride 105 mmol/L      CO2 27 mmol/L      ANION GAP 4 mmol/L      BUN 24 mg/dL      Creatinine 1 29 mg/dL      Glucose 146 mg/dL      Calcium 9 2 mg/dL      Corrected Calcium 10 1 mg/dL      AST 16 U/L      ALT 27 U/L      Alkaline Phosphatase 161 U/L      Total Protein 7 3 g/dL      Albumin 2 9 g/dL      Total Bilirubin 0 50 mg/dL      eGFR 36 ml/min/1 73sq m     Narrative:      [place] guidelines for Chronic Kidney Disease (CKD):     Stage 1 with normal or high GFR (GFR > 90 mL/min/1 73 square meters)    Stage 2 Mild CKD (GFR = 60-89 mL/min/1 73 square meters)    Stage 3A Moderate CKD (GFR = 45-59 mL/min/1 73 square meters)    Stage 3B Moderate CKD (GFR = 30-44 mL/min/1 73 square meters)    Stage 4 Severe CKD (GFR = 15-29 mL/min/1 73 square meters)    Stage 5 End Stage CKD (GFR <15 mL/min/1 73 square meters)  Note: GFR calculation is accurate only with a steady state creatinine    UA w Reflex to Microscopic w Reflex to Culture [[id number]]     Lab Status: No result Specimen: Urine                  CT abdomen pelvis wo contrast   Final Result by Rocio Cooper DO (06/25 9351)      Colonic diverticulosis with minimal inflammatory changes around the sigmoid colon which may represent acute diverticulitis  No drainable fluid collection  Recommend posttreatment colonoscopy to rule out underlying neoplasm  Distended gallbladder with gallstones and sludge    If clinically warranted right upper quadrant sonogram may be obtained for further evaluation The study was marked in [facility] for immediate notification  Workstation performed: [id number]         CTA head and neck with and without contrast   Final Result by Jolly Saeed MD (06/24 1952)      1  Moderate to severe (60-80%) in the the 4th segment of the left vertebral artery secondary to atherosclerotic plaque  No hemodynamically significant stenosis, dissection or occlusion of the carotid arteries  2   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  Recommend nonemergent neurosciences Center consultation  No hemodynamically significant stenosis or occlusion of the major vessels of the [place]  3   Right frontal extra-axial enhancing, partially calcified 2 6 cm mass suggesting a meningioma  Small mild vasogenic edema along the posterior aspect  Recommend MRI of the brain with gadolinium  4   No evidence of acute infarct, significant mass effect or intracranial hemorrhage  Workstation performed: [id number]         XR chest portable   Final Result by Luz Carr DO (06/25 2867)      No acute cardiopulmonary disease  Workstation performed: [id number]         MRI brain w wo contrast    (Results Pending)   US right upper quadrant    (Results Pending)         Procedures  Procedures      ED Course  ED Course as of 06/25/22 0856   Fri Jun 24, 2022   1905 Discussed with family who is on route  They report months of confusion, memory loss  Also report earlier today she had a transient of L sided weakness and that's when EMS was called                                       MDM  Number of Diagnoses or Management Options  Ambulatory dysfunction  Shortness of breath  Thrombocytopenia (HCC)  Diagnosis management comments: Kevin Swanson is a 80 y o  who presents with complaints of altered mental status    Vital signs are stable, physical exam shows no deficits    Ddx: CVA vs metabolic encephalopathy vs infection vs ACS vs other      Assessment: Overall the pt appears at her baseline which is unknown at this time  No focal deficits and no indication for stroke alert at this time given she's outside of tPA window and no evidence of LVO  However given her family's complaints, will obtain broad workup  Plan: CBC, CMP, Lactate, Procal, EKG, Trop, CT Head    Re-assessment: Workup remarkable for Plts of 22  EKG and labs unremarkable  Remainder of care signed out to evening team  Chart review remarkable for admission 2/2 to uncontrolled HTN  The remainder of her ED course was unremarkable  Disposition  Final diagnoses: Thrombocytopenia ([facility] )   Ambulatory dysfunction   Shortness of breath     Time reflects when diagnosis was documented in both MDM as applicable and the Disposition within this note     Time User Action Codes Description Comment    6/24/2022  8:53 PM Bernabe Heladio Add [D69 6] Thrombocytopenia ([facility] [address] )     6/24/2022  8:53 PM Bernabe Heladio Add [R26 2] Ambulatory dysfunction     6/24/2022  8:53 PM Magalis Cambric T Add [R06 02] Shortness of breath     6/24/2022  9:07 PM Trenton Duck S Add [I10] Uncontrolled hypertension     6/25/2022  8:37 AM Lorena Aid Add [K57 92] Diverticulitis       ED Disposition     ED Disposition   Admit    Condition   Stable    Date/Time   Fri Jun 24, 2022  8:55 PM    Comment   Case was discussed with Gricel Lew and the patient's admission status was agreed to be Admission Status: inpatient status to the service of Dr Gricel Lew              Follow-up Information    None         Current Discharge Medication List      CONTINUE these medications which have NOT CHANGED    Details   LORazepam (ATIVAN) 0 5 mg tablet Take 0 5 mg by mouth 2 (two) times a day      allopurinol (ZYLOPRIM) 100 mg tablet Take 1 tablet (100 mg total) by mouth daily  Qty: 90 tablet, Refills: 0    Associated Diagnoses: Secondary hypertension; Gout, unspecified cause, unspecified chronicity, unspecified site      aspirin 81 mg chewable tablet Chew 1 tablet daily      busPIRone (BUSPAR) 10 mg tablet Take 1 tablet (10 mg total) by mouth 3 (three) times a day  Qty: 270 tablet, Refills: 0    Associated Diagnoses: Insomnia due to medical condition      cetirizine (ZyrTEC) 10 mg tablet Take 10 mg by mouth daily      Cholecalciferol (D3-1000) 1000 units tablet Take 1,000 Units by mouth daily      Coenzyme Q10 (CO Q 10) 100 MG CAPS Take by mouth      cyanocobalamin (VITAMIN B-12) 1,000 mcg tablet Take by mouth      famotidine (PEPCID) 20 mg tablet Take 1 tablet (20 mg total) by mouth 2 (two) times a day  Qty: 180 tablet, Refills: 1    Associated Diagnoses: Gastroesophageal reflux disease without esophagitis      furosemide (LASIX) 20 mg tablet TAKE 1 TABLET(20 MG) BY MOUTH DAILY AS NEEDED FOR SWELLING  Qty: 90 tablet, Refills: 1    Associated Diagnoses: Edema of both lower extremities      ketoconazole (NIZORAL) 2 % cream Apply topically daily  Qty: 60 g, Refills: 0    Associated Diagnoses: Seborrheic dermatitis      levothyroxine 50 mcg tablet Take 1 tablet (50 mcg total) by mouth daily  Qty: 90 tablet, Refills: 3    Associated Diagnoses: Acquired hypothyroidism      lisinopril (ZESTRIL) 40 mg tablet Take 0 5 tablets (20 mg total) by mouth daily  Qty: 90 tablet, Refills: 1    Associated Diagnoses: Hypertension, unspecified type      Magnesium 100 MG TABS Take by mouth      meloxicam (MOBIC) 7 5 mg tablet TAKE 1 TABLET(7 5 MG) BY MOUTH DAILY  Qty: 30 tablet, Refills: 0    Associated Diagnoses: Generalized osteoarthritis; Primary localized osteoarthritis of both knees      metoprolol succinate (TOPROL-XL) 200 MG 24 hr tablet Take 0 5 tablets (100 mg total) by mouth 3 (three) times a day  Qty: 135 tablet, Refills: 3    Associated Diagnoses: Benign essential hypertension      Multiple Vitamins-Minerals (PRESERVISION AREDS) CAPS Take by mouth      pantoprazole (PROTONIX) 40 mg tablet Take 1 tablet (40 mg total) by mouth daily  Qty: 90 tablet, Refills: 1    Associated Diagnoses: Gastroesophageal reflux disease without esophagitis           No discharge procedures on file  PDMP Review       Value Time User    PDMP Reviewed  Yes 4/28/2020 15:71 AM Lauryn Plascencia DO           ED Provider  Attending physically available and evaluated Robert Lopez  LEENA managed the patient along with the ED Attending      Electronically Signed by         Shyann Graham MD  06/25/22 1368

## 2022-06-25 NOTE — ASSESSMENT & PLAN NOTE
Thrombocytopenia may be secondary to elevated blood pressure; will put on hold VTE prophylaxis  Hematology consult  Trend platelet count

## 2022-06-25 NOTE — PLAN OF CARE
Problem: MOBILITY - ADULT  Goal: Maintain or return to baseline ADL function  Description: INTERVENTIONS:  -  Assess patient's ability to carry out ADLs; assess patient's baseline for ADL function and identify physical deficits which impact ability to perform ADLs (bathing, care of mouth/teeth, toileting, grooming, dressing, etc )  - Assess/evaluate cause of self-care deficits   - Assess range of motion  - Assess patient's mobility; develop plan if impaired  - Assess patient's need for assistive devices and provide as appropriate  - Encourage maximum independence but intervene and supervise when necessary  - Involve family in performance of ADLs  - Assess for home care needs following discharge   - Consider OT consult to assist with ADL evaluation and planning for discharge  - Provide patient education as appropriate  Outcome: Progressing  Goal: Maintains/Returns to pre admission functional level  Description: INTERVENTIONS:  - Perform BMAT or MOVE assessment daily    - Set and communicate daily mobility goal to care team and patient/family/caregiver  - Collaborate with rehabilitation services on mobility goals if consulted  - Perform Range of Motion 3 times a day  - Reposition patient every 3 hours    - Dangle patient 3 times a day  - Stand patient 3 times a day  - Ambulate patient 3 times a day  - Out of bed to chair 3 times a day   - Out of bed for meals 3 times a day  - Out of bed for toileting  - Record patient progress and toleration of activity level   Outcome: Progressing     Problem: Potential for Falls  Goal: Patient will remain free of falls  Description: INTERVENTIONS:  - Educate patient/family on patient safety including physical limitations  - Instruct patient to call for assistance with activity   - Consult OT/PT to assist with strengthening/mobility   - Keep Call bell within reach  - Keep bed low and locked with side rails adjusted as appropriate  - Keep care items and personal belongings within reach  - Initiate and maintain comfort rounds  - Make Fall Risk Sign visible to staff  - Offer Toileting every 2 Hours, in advance of need  - Initiate/Maintain bed alarm  - Obtain necessary fall risk management equipment: bed alarm  - Apply yellow socks and bracelet for high fall risk patients  - Consider moving patient to room near nurses station  Outcome: Progressing     Problem: PAIN - ADULT  Goal: Verbalizes/displays adequate comfort level or baseline comfort level  Description: Interventions:  - Encourage patient to monitor pain and request assistance  - Assess pain using appropriate pain scale  - Administer analgesics based on type and severity of pain and evaluate response  - Implement non-pharmacological measures as appropriate and evaluate response  - Consider cultural and social influences on pain and pain management  - Notify physician/advanced practitioner if interventions unsuccessful or patient reports new pain  Outcome: Progressing     Problem: INFECTION - ADULT  Goal: Absence or prevention of progression during hospitalization  Description: INTERVENTIONS:  - Assess and monitor for signs and symptoms of infection  - Monitor lab/diagnostic results  - Monitor all insertion sites, i e  indwelling lines, tubes, and drains  - Monitor endotracheal if appropriate and nasal secretions for changes in amount and color  - Nixon appropriate cooling/warming therapies per order  - Administer medications as ordered  - Instruct and encourage patient and family to use good hand hygiene technique  - Identify and instruct in appropriate isolation precautions for identified infection/condition  Outcome: Progressing  Goal: Absence of fever/infection during neutropenic period  Description: INTERVENTIONS:  - Monitor WBC    Outcome: Progressing     Problem: SAFETY ADULT  Goal: Maintain or return to baseline ADL function  Description: INTERVENTIONS:  -  Assess patient's ability to carry out ADLs; assess patient's baseline for ADL function and identify physical deficits which impact ability to perform ADLs (bathing, care of mouth/teeth, toileting, grooming, dressing, etc )  - Assess/evaluate cause of self-care deficits   - Assess range of motion  - Assess patient's mobility; develop plan if impaired  - Assess patient's need for assistive devices and provide as appropriate  - Encourage maximum independence but intervene and supervise when necessary  - Involve family in performance of ADLs  - Assess for home care needs following discharge   - Consider OT consult to assist with ADL evaluation and planning for discharge  - Provide patient education as appropriate  Outcome: Progressing  Goal: Maintains/Returns to pre admission functional level  Description: INTERVENTIONS:  - Perform BMAT or MOVE assessment daily    - Set and communicate daily mobility goal to care team and patient/family/caregiver  - Collaborate with rehabilitation services on mobility goals if consulted  - Perform Range of Motion 3 times a day  - Reposition patient every 3 hours    - Dangle patient 3 times a day  - Stand patient 3 times a day  - Ambulate patient 3 times a day  - Out of bed to chair 3 times a day   - Out of bed for meals 3 times a day  - Out of bed for toileting  - Record patient progress and toleration of activity level   Outcome: Progressing  Goal: Patient will remain free of falls  Description: INTERVENTIONS:  - Educate patient/family on patient safety including physical limitations  - Instruct patient to call for assistance with activity   - Consult OT/PT to assist with strengthening/mobility   - Keep Call bell within reach  - Keep bed low and locked with side rails adjusted as appropriate  - Keep care items and personal belongings within reach  - Initiate and maintain comfort rounds  - Make Fall Risk Sign visible to staff  - Offer Toileting every 2 Hours, in advance of need  - Initiate/Maintain bed alarm  - Obtain necessary fall risk management equipment: bed alarm  - Apply yellow socks and bracelet for high fall risk patients  - Consider moving patient to room near nurses station  Outcome: Progressing     Problem: Knowledge Deficit  Goal: Patient/family/caregiver demonstrates understanding of disease process, treatment plan, medications, and discharge instructions  Description: Complete learning assessment and assess knowledge base    Interventions:  - Provide teaching at level of understanding  - Provide teaching via preferred learning methods  Outcome: Progressing     Problem: Prexisting or High Potential for Compromised Skin Integrity  Goal: Skin integrity is maintained or improved  Description: INTERVENTIONS:  - Identify patients at risk for skin breakdown  - Assess and monitor skin integrity  - Assess and monitor nutrition and hydration status  - Monitor labs   - Assess for incontinence   - Turn and reposition patient  - Assist with mobility/ambulation  - Relieve pressure over bony prominences  - Avoid friction and shearing  - Provide appropriate hygiene as needed including keeping skin clean and dry  - Evaluate need for skin moisturizer/barrier cream  - Collaborate with interdisciplinary team   - Patient/family teaching  - Consider wound care consult   Outcome: Progressing

## 2022-06-25 NOTE — ASSESSMENT & PLAN NOTE
Due to atherosclerotic plaque; will check lipid profile tomorrow and may benefit starting Lipitor 40 mg

## 2022-06-25 NOTE — PROGRESS NOTES
[address] [facility]  Progress Note - Melvi Moore 12/24/1930, 80 y o  female MRN: [id number]  Unit/Bed#: [facility] 930-01 Encounter: [id number]  Primary Care Provider: Rl Mclaughlin DO   Date and time admitted to hospital: 6/24/2022  4:04 PM    Dizziness  Assessment & Plan  Patient complaining of dizziness for the past 24 hours  Now resolved  Will monitor closely       Thrombocytopenia (HCC)  Assessment & Plan  ITP? discussed with hematology   Repeat CBC   If plat <50 then give decadron 40 once a day    Brain mass  Assessment & Plan  Brain mass noted in CTA; may be meningioma  Recommended MRI; plan from hospitalist yesterday was to order to today  Will order MRI today  Neuro checks every 4 hours  Primary hypothyroidism  Assessment & Plan  Continue levothyroxine 50 mcg daily   TSH  Mild vitamin D deficiency  Assessment & Plan  On cholecalciferol  Hyperuricemia  Assessment & Plan  Continue allopurinol 100 mg daily  Hyperlipidemia  Assessment & Plan  Due to atherosclerotic plaque; will check lipid profile tomorrow and may benefit starting Lipitor 40 mg     * Uncontrolled hypertension  Assessment & Plan  Appreciate cardiology input  Metoprolol changed to coreg  Will continue to monitor blood pressure              VTE Pharmacologic Prophylaxis:   contraindicated for chem prop    Patient Centered Rounds: I performed bedside rounds with nursing staff today  Discussions with Specialists or Other Care Team Provider: Discussed with hematology oncology       Education and Discussions with Family / Patient: called Daughter Meghana Parson-   son in law answered  Said wife was "talking to neighbors outside"  "you can update me"- I updated son-in-law  Time Spent for Care: 30 minutes  More than 50% of total time spent on counseling and coordination of care as described above      Current Length of Stay: 1 day(s)  Current Patient Status: Inpatient   Certification Statement: The patient will continue to require additional inpatient hospital stay due to thrombocytopenia   Discharge Plan: Anticipate discharge in 48-72 hrs to depending     Code Status: Level 1 - Full Code    Subjective:   Patient seen and examined   Feeling "good"    Objective:     Vitals:   Temp (24hrs), Av 4 °F (36 3 °C), Min:97 3 °F (36 3 °C), Max:97 5 °F (36 4 °C)    Temp:  [97 3 °F (36 3 °C)-97 5 °F (36 4 °C)] 97 3 °F (36 3 °C)  HR:  [61-71] 70  Resp:  [16-18] 16  BP: (160-223)/() 160/74  SpO2:  [96 %-99 %] 98 %  Body mass index is 38 62 kg/m²  Input and Output Summary (last 24 hours):   No intake or output data in the 24 hours ending 22 1332    Physical Exam:   Physical Exam  Constitutional:       General: She is not in acute distress  Appearance: She is not ill-appearing or toxic-appearing  HENT:      Head: Normocephalic  Eyes:      General: No scleral icterus  Right eye: No discharge  Left eye: No discharge  Pupils: Pupils are equal, round, and reactive to light  Cardiovascular:      Rate and Rhythm: Normal rate  Heart sounds: No murmur heard  No friction rub  No gallop  Pulmonary:      Effort: No respiratory distress  Breath sounds: No stridor  No wheezing, rhonchi or rales  Chest:      Chest wall: No tenderness  Abdominal:      General: There is no distension  Palpations: There is no mass  Tenderness: There is no right CVA tenderness, left CVA tenderness or guarding  Musculoskeletal:         General: No tenderness or signs of injury  Right lower leg: No edema  Skin:     Coloration: Skin is pale  Skin is not jaundiced  Findings: No bruising, erythema, lesion or rash  Neurological:      General: No focal deficit present  Cranial Nerves: No cranial nerve deficit  Sensory: No sensory deficit  Motor: No weakness        Gait: Gait normal       Deep Tendon Reflexes: Reflexes normal    Psychiatric:         Mood and Affect: Mood normal           Additional Data:     Labs:  Results from last 7 days   Lab Units 06/24/22  1654   WBC Thousand/uL 5 27   HEMOGLOBIN g/dL 9 9*   HEMATOCRIT % 30 8*   PLATELETS Thousands/uL 22*   NEUTROS PCT % 68   LYMPHS PCT % 23   MONOS PCT % 9   EOS PCT % 0     Results from last 7 days   Lab Units 06/24/22  1645   SODIUM mmol/L 136   POTASSIUM mmol/L 4 0   CHLORIDE mmol/L 105   CO2 mmol/L 27   BUN mg/dL 24   CREATININE mg/dL 1 29   ANION GAP mmol/L 4   CALCIUM mg/dL 9 2   ALBUMIN g/dL 2 9*   TOTAL BILIRUBIN mg/dL 0 50   ALK PHOS U/L 161*   ALT U/L 27   AST U/L 16   GLUCOSE RANDOM mg/dL 146*     Results from last 7 days   Lab Units 06/24/22  1721   INR  1 00             Results from last 7 days   Lab Units 06/24/22  1737 06/24/22  1654   LACTIC ACID mmol/L 1 9  --    PROCALCITONIN ng/ml  --  0 06       Lines/Drains:  Invasive Devices  Report    Peripheral Intravenous Line  Duration           Peripheral IV 06/24/22 Left;Ventral (anterior) Forearm <1 day    Peripheral IV 06/24/22 Proximal;Right;Ventral (anterior) Antecubital <1 day                      Imaging: No pertinent imaging reviewed  Recent Cultures (last 7 days):   Results from last 7 days   Lab Units 06/24/22  1654 06/24/22  1645   BLOOD CULTURE  Received in Microbiology Lab  Culture in Progress  Received in Microbiology Lab  Culture in Progress         Last 24 Hours Medication List:   Current Facility-Administered Medications   Medication Dose Route Frequency Provider Last Rate    acetaminophen  650 mg Oral Q6H PRN Tom Beck MD      allopurinol  100 mg Oral Daily Tom Beck MD      aluminum-magnesium hydroxide-simethicone  30 mL Oral Q6H PRN Tom Beck MD      atorvastatin  40 mg Oral Daily With Guillermina Conway MD      busPIRone  10 mg Oral TID Tom Beck MD      carvedilol  12 5 mg Oral BID With Meals RAO Carpenter      cefTRIAXone  1,000 mg Intravenous Q24H Natacha Gomez MD 1,000 mg (06/25/22 1152)    cholecalciferol  1,000 Units Oral Daily Akilah Moreno MD      co-enzyme Q-10  100 mg Oral Daily Akilah Moreno MD      vitamin B-12  1,000 mcg Oral Daily Akilah Moreno MD      docusate sodium  100 mg Oral BID PRN Akilah Moreno MD      famotidine  20 mg Oral Daily Akilah Moreno MD      furosemide  20 mg Oral Daily PRN Akilah Moreno MD      labetalol  10 mg Intravenous Q6H PRN RAO Betancourt      levothyroxine  50 mcg Oral Early Morning Akilah Moreno MD      lisinopril  20 mg Oral Daily Akilah Moreno MD      loratadine  10 mg Oral Daily Akilah Moreno MD      LORazepam  0 5 mg Oral BID Akilah Moreno MD      magnesium oxide  400 mg Oral Daily Akilah Moreno MD      metroNIDAZOLE  500 mg Intravenous Vanesa Rodriguez  mg (06/25/22 1027)    multivitamin-minerals  1 tablet Oral Daily Akialh Moreno MD      ondansetron  4 mg Intravenous Q6H PRN Akilah Moreno MD      pantoprazole  40 mg Oral Daily Akilah Moreno MD          Today, Patient Was Seen By: Lance Avelar MD    **Please Note: This note may have been constructed using a voice recognition system  **

## 2022-06-25 NOTE — ASSESSMENT & PLAN NOTE
Patient complaining of dizziness for the past 24 hours; blood pressure elevated and noted moderate to severe atherosclerotic plaque at the left vertebral artery  TSH with hemoglobin A1c and lipid profile for tomorrow  Occupational and physical therapy consult with case management

## 2022-06-25 NOTE — CONSULTS
Cardiology   [facility] 80 y o  female MRN: [id number]  [facility] En[facility]: [id number]      Reason for Consult / Principal Problem:    Physician Requesting Consult:  Harsha Mcconnell MD    Outpatient Cardiologist: Dr April Malone  1  Accelerated hypertension - possibly provoked in the setting of # 2  -/110 on admission, last recorded at 164/77   -In the ED received x1 dose of IV hydralazine 10 mg and IV Lasix 40 mg  -Outpatient BP regimen; lisinopril 20 mg daily, and metoprolol succinate 100 mg t i d  -Inpatient BP regimen; lisinopril 20 mg daily, and metoprolol succinate 100 mg t i d  2  Abdominal pain/nausea/vomiting  -GI consulted  -Distended gallbladder with gallstones and sludge   -Colonic diverticulosis with minimal inflammatory changes around the sigmoid colon which may represent acute diverticulitis  3  CAD s/p CABG x 6 (2009)  -No recent ischemic testing for review   -No recent symptoms of chest pain/pressure  -HS troponin levels unremarkable on admission  ECG on admission NSR, with nonspecific ST T-wave abnormalities   -On aspirin 81 mg daily  -Previously on no statin, but was started on atorvastatin 40 mg daily this admission by the primary team   -On metoprolol succinate 100 mg t i d  4  Chronic diastolic CHF  -Appears compensated  -TTE 2014 - LVEF 55%, no regional wall motion abnormalities, mild concentric hypertrophy, grade 1 DD, mild AI, and mild TR  -Previously on oral furosemide 20 mg daily as needed for lower extremity edema  5  Dyslipidemia  -Lipid profile 6/01/2021 - cholesterol 248, triglycerides 170, HDL 51, and     -Previously on no statin, but was started on atorvastatin 40 mg daily this admission by the primary team   6  Morbid obesity; BMI 38    Plan  -Accelerated HTN likely provoked in the setting of acute GI issues (including abdominal pain/nausea/vomiting)    She appears to be well compensated from a CHF standpoint without any PE or significant imaging findings of volume overload  She may have missed some doses of her blood pressure medications over the past day or 2 given her acute GI symptoms  Regardless, we will assist in adjusting blood pressure medications if she is able to tolerate oral intake  Would recommend discontinuing Toprol and switch her to carvedilol 12 5 mg BID this a m and assess her response  Will continue lisinopril for now and can even up titrate this to 40 mg daily if needed  If unable to tolerate oral intake due to persistent nausea/vomiting can utilize IV labetalol 10 mg q 6 hours for SBP > 160     -A TTE was requested by the primary team, we can follow up with these imaging results   -No further diuretics are necessary at this time    -Platelet count 22 this a m - would place aspirin on hold until she is further evaluated by the GI/Heme-Onc service   -No indication for telemetry monitoring at this time  HPI: David Barkley 80y o  year old female with a medical history of CAD s/p CABG x6 (2009), essential hypertension, dyslipidemia, morbid obesity, and hypothyroidism  Former smoker (quit in 2000) no report of excessive alcohol or recreational drug use  She previously followed with Dr Nitish Knight however has not been seen by him since 2016  She presented to the [facility] on 6/24/22 with complaints of mild confusion, nausea/vomiting, dizziness, and shortness of breath  Further workup in the ED   Hemodynamics on admission  Temp 97 5° F, HR 62, R 18, /110, sat 97% on RA  Laboratory data on admission  NA +136, K +4 0, chloride 105, CO2 27, anion gap 4, BUN 24, creatinine 1 29, glucose 146, calcium 9 2, AST 16, ALT 27, alk-phos 161, T bili 0 5, albumin 2 9  WBC 5 3, HGB 9 9, and platelet count 22  HS troponin levels 7 - 7  Imaging  -CTA head/neck - 6/24/22  1  No evidence of acute infarct, or intracranial hemorrhage    2  Right frontal extra-axial enhancing, partially calcified 2 6 cm mass suggesting a meningioma  Small mild vasogenic edema along the posterior aspect  3  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  4  Moderate to severe (60-80%) in the the 4th segment of the left vertebral artery secondary to atherosclerotic plaque  -CT abdomen pelvis without contrast - 22  -Distended gallbladder with gallstones and sludge   -Colonic diverticulosis with minimal inflammatory changes around the sigmoid colon which may represent acute diverticulitis  Family History:   Family History   Problem Relation Age of Onset    Other Mother         Maternal coagulation defect complicating pregnancy/childbirth/puerperium     Emphysema Father     Other Sister         Pacemaker Placement     Coronary artery disease Brother     Melanoma Daughter      Historical Information   Past Medical History:   Diagnosis Date    Arthritis     Malignant neoplasm of breast ([address] )     Resolved:  Oct 26, 2015    Sciatica     last assessed: 2013     Past Surgical History:   Procedure Laterality Date    BREAST BIOPSY      BREAST LUMPECTOMY      CARDIAC SURGERY      CATARACT EXTRACTION      CORONARY ARTERY BYPASS GRAFT      ROTATOR CUFF REPAIR      TONSILLECTOMY      TOTAL ABDOMINAL HYSTERECTOMY       Social History   Social History     Substance and Sexual Activity   Alcohol Use Yes    Comment: social      Social History     Substance and Sexual Activity   Drug Use No     Social History     Tobacco Use   Smoking Status Former Smoker    Types: Cigarettes    Quit date:     Years since quittin 4   Smokeless Tobacco Never Used     Family History:   Family History   Problem Relation Age of Onset    Other Mother         Maternal coagulation defect complicating pregnancy/childbirth/puerperium     Emphysema Father     Other Sister         Pacemaker Placement     Coronary artery disease Brother     Melanoma Daughter        Review of Systems:  Review of Systems   Constitutional: Positive for activity change, appetite change and fatigue  Negative for chills, fever and unexpected weight change  Eyes: Negative for visual disturbance  Respiratory: Negative for cough, chest tightness and shortness of breath  Cardiovascular: Negative for chest pain, palpitations and leg swelling  Gastrointestinal: Positive for abdominal pain, nausea and vomiting  Neurological: Negative for dizziness and light-headedness  All other systems reviewed and are negative            Scheduled Meds:  Current Facility-Administered Medications   Medication Dose Route Frequency Provider Last Rate    acetaminophen  650 mg Oral Q6H PRN James Salazar MD      allopurinol  100 mg Oral Daily James Salazar MD      aluminum-magnesium hydroxide-simethicone  30 mL Oral Q6H PRN James Salazar MD      aspirin  81 mg Oral Daily James Salazar MD      atorvastatin  40 mg Oral Daily With Lidia Herr MD      busPIRone  10 mg Oral TID James Salazar MD      cefTRIAXone  1,000 mg Intravenous Q24H Jenaro Braga MD      cholecalciferol  1,000 Units Oral Daily James Salazar MD      co-enzyme Q-10  100 mg Oral Daily James Salazar MD      vitamin B-12  1,000 mcg Oral Daily James Salazar MD      docusate sodium  100 mg Oral BID PRN James Salazar MD      famotidine  20 mg Oral Daily James Salazar MD      furosemide  20 mg Oral Daily PRN James Salazar MD      hydrALAZINE  10 mg Intravenous Q4H PRN James Salazar MD      levothyroxine  50 mcg Oral Early Morning James Salazar MD      lisinopril  20 mg Oral Daily James Salazar MD      loratadine  10 mg Oral Daily James Salazar MD      LORazepam  0 5 mg Oral BID James Salazar MD      magnesium oxide  400 mg Oral Daily James Salazar MD      metoprolol succinate  100 mg Oral TID James Salazar MD      metroNIDAZOLE  500 mg Intravenous Caity Eagle MD      multivitamin-minerals  1 tablet Oral Daily MD Nitza Molina ondansetron  4 mg Intravenous Q6H PRN Luli Thomas MD      pantoprazole  40 mg Oral Daily Luli Thomas MD       Continuous Infusions:   PRN Meds:   acetaminophen    aluminum-magnesium hydroxide-simethicone    docusate sodium    furosemide    hydrALAZINE    ondansetron  all current active meds have been reviewed and current meds:   Current Facility-Administered Medications   Medication Dose Route Frequency    acetaminophen (TYLENOL) tablet 650 mg  650 mg Oral Q6H PRN    allopurinol (ZYLOPRIM) tablet 100 mg  100 mg Oral Daily    aluminum-magnesium hydroxide-simethicone (MYLANTA) oral suspension 30 mL  30 mL Oral Q6H PRN    aspirin chewable tablet 81 mg  81 mg Oral Daily    atorvastatin (LIPITOR) tablet 40 mg  40 mg Oral Daily With Dinner    busPIRone (BUSPAR) tablet 10 mg  10 mg Oral TID    cefTRIAXone (ROCEPHIN) 1,000 mg in dextrose 5 % 50 mL IVPB  1,000 mg Intravenous Q24H    cholecalciferol (VITAMIN D3) tablet 1,000 Units  1,000 Units Oral Daily    co-enzyme Q-10 capsule 100 mg  100 mg Oral Daily    cyanocobalamin (VITAMIN B-12) tablet 1,000 mcg  1,000 mcg Oral Daily    docusate sodium (COLACE) capsule 100 mg  100 mg Oral BID PRN    famotidine (PEPCID) tablet 20 mg  20 mg Oral Daily    furosemide (LASIX) tablet 20 mg  20 mg Oral Daily PRN    hydrALAZINE (APRESOLINE) injection 10 mg  10 mg Intravenous Q4H PRN    levothyroxine tablet 50 mcg  50 mcg Oral Early Morning    lisinopril (ZESTRIL) tablet 20 mg  20 mg Oral Daily    loratadine (CLARITIN) tablet 10 mg  10 mg Oral Daily    LORazepam (ATIVAN) tablet 0 5 mg  0 5 mg Oral BID    magnesium oxide (MAG-OX) tablet 400 mg  400 mg Oral Daily    metoprolol succinate (TOPROL-XL) 24 hr tablet 100 mg  100 mg Oral TID    metroNIDAZOLE (FLAGYL) IVPB (premix) 500 mg 100 mL  500 mg Intravenous Q8H    multivitamin-minerals (CENTRUM) tablet 1 tablet  1 tablet Oral Daily    ondansetron (ZOFRAN) injection 4 mg  4 mg Intravenous Q6H PRN    pantoprazole (PROTONIX) EC tablet 40 mg  40 mg Oral Daily       Allergies   Allergen Reactions    Meperidine Nausea Only    Tramadol Nausea Only       Objective   Vitals: Blood pressure 164/77, pulse 64, temperature (!) 97 3 °F (36 3 °C), temperature source Oral, resp  rate 18, height 5' 3" (1 6 m), weight 98 9 kg (218 lb 0 6 oz), SpO2 96 %  , Body mass index is 38 62 kg/m² ,   Orthostatic Blood Pressures    Flowsheet Row Most Recent Value   Blood Pressure 164/77 filed at 06/25/2022 0128   Patient Position - Orthostatic VS Sitting filed at 06/24/2022 2344          No intake or output data in the 24 hours ending 06/25/22 0846    Invasive Devices  Report    Peripheral Intravenous Line  Duration           Peripheral IV 06/24/22 Left;Ventral (anterior) Forearm <1 day    Peripheral IV 06/24/22 Proximal;Right;Ventral (anterior) Antecubital <1 day              Physical Exam:  Physical Exam  Vitals and nursing note reviewed  Constitutional:       General: She is not in acute distress  Appearance: She is obese  She is not diaphoretic  HENT:      Head: Normocephalic and atraumatic  Mouth/Throat:      Mouth: Mucous membranes are moist    Eyes:      General: No scleral icterus  Cardiovascular:      Rate and Rhythm: Normal rate and regular rhythm  Pulses: Normal pulses  Heart sounds: Normal heart sounds  No murmur heard  Pulmonary:      Effort: Pulmonary effort is normal       Breath sounds: No wheezing or rales  Abdominal:      Palpations: Abdomen is soft  Comments: Obese abdomen   Musculoskeletal:      Cervical back: Neck supple  Right lower leg: No edema  Left lower leg: No edema  Skin:     General: Skin is warm and dry  Capillary Refill: Capillary refill takes less than 2 seconds  Neurological:      General: No focal deficit present  Mental Status: She is alert and oriented to person, place, and time     Psychiatric:         Mood and Affect: Mood normal          Lab Results:   Recent Results (from the past 24 hour(s))   Comprehensive metabolic panel    Collection Time: 06/24/22  4:45 PM   Result Value Ref Range    Sodium 136 136 - 145 mmol/L    Potassium 4 0 3 5 - 5 3 mmol/L    Chloride 105 100 - 108 mmol/L    CO2 27 21 - 32 mmol/L    ANION GAP 4 4 - 13 mmol/L    BUN 24 5 - 25 mg/dL    Creatinine 1 29 0 60 - 1 30 mg/dL    Glucose 146 (H) 65 - 140 mg/dL    Calcium 9 2 8 3 - 10 1 mg/dL    Corrected Calcium 10 1 8 3 - 10 1 mg/dL    AST 16 5 - 45 U/L    ALT 27 12 - 78 U/L    Alkaline Phosphatase 161 (H) 46 - 116 U/L    Total Protein 7 3 6 4 - 8 2 g/dL    Albumin 2 9 (L) 3 5 - 5 0 g/dL    Total Bilirubin 0 50 0 20 - 1 00 mg/dL    eGFR 36 ml/min/1 73sq m   Blood culture #2    Collection Time: 06/24/22  4:45 PM    Specimen: Arm, Right; Blood   Result Value Ref Range    Blood Culture Received in Microbiology Lab  Culture in Progress      HS Troponin 0hr (reflex protocol)    Collection Time: 06/24/22  4:49 PM   Result Value Ref Range    hs TnI 0hr 7 "Refer to ACS Flowchart"- see link ng/L   CBC and differential    Collection Time: 06/24/22  4:54 PM   Result Value Ref Range    WBC 5 27 4 31 - 10 16 Thousand/uL    RBC 3 24 (L) 3 81 - 5 12 Million/uL    Hemoglobin 9 9 (L) 11 5 - 15 4 g/dL    Hematocrit 30 8 (L) 34 8 - 46 1 %    MCV 95 82 - 98 fL    MCH 30 6 26 8 - 34 3 pg    MCHC 32 1 31 4 - 37 4 g/dL    RDW 13 8 11 6 - 15 1 %    MPV 13 7 (H) 8 9 - 12 7 fL    Platelets 22 (LL) 140 - 390 Thousands/uL    nRBC 0 /100 WBCs    Neutrophils Relative 68 43 - 75 %    Immat GRANS % 0 0 - 2 %    Lymphocytes Relative 23 14 - 44 %    Monocytes Relative 9 4 - 12 %    Eosinophils Relative 0 0 - 6 %    Basophils Relative 0 0 - 1 %    Neutrophils Absolute 3 50 1 85 - 7 62 Thousands/µL    Immature Grans Absolute 0 02 0 00 - 0 20 Thousand/uL    Lymphocytes Absolute 1 23 0 60 - 4 47 Thousands/µL    Monocytes Absolute 0 49 0 17 - 1 22 Thousand/µL    Eosinophils Absolute 0 02 0 00 - 0 61 Thousand/µL    Basophils Absolute 0 01 0 00 - 0 10 Thousands/µL   Procalcitonin    Collection Time: 06/24/22  4:54 PM   Result Value Ref Range    Procalcitonin 0 06 <=0 25 ng/ml   Blood culture #1    Collection Time: 06/24/22  4:54 PM    Specimen: Line, Venous; Blood   Result Value Ref Range    Blood Culture Received in Microbiology Lab  Culture in Progress      Protime-INR    Collection Time: 06/24/22  5:21 PM   Result Value Ref Range    Protime 12 8 11 6 - 14 5 seconds    INR 1 00 0 84 - 1 19   APTT    Collection Time: 06/24/22  5:21 PM   Result Value Ref Range    PTT 26 23 - 37 seconds   D-dimer, quantitative    Collection Time: 06/24/22  5:21 PM   Result Value Ref Range    D-Dimer, Quant 1 90 (H) <0 50 ug/ml FEU   Fibrinogen    Collection Time: 06/24/22  5:21 PM   Result Value Ref Range    Fibrinogen 512 (H) 227 - 495 mg/dL   Fibrin split products    Collection Time: 06/24/22  5:21 PM   Result Value Ref Range    FDP <10 <10   Lactic acid    Collection Time: 06/24/22  5:37 PM   Result Value Ref Range    LACTIC ACID 1 9 0 5 - 2 0 mmol/L   Urine Macroscopic, POC    Collection Time: 06/24/22  7:18 PM   Result Value Ref Range    Color, UA Yellow     Clarity, UA Clear     pH, UA 7 0 4 5 - 8 0    Leukocytes, UA Negative Negative    Nitrite, UA Negative Negative    Protein, UA Trace (A) Negative mg/dl    Glucose, UA Negative Negative mg/dl    Ketones, UA Negative Negative mg/dl    Urobilinogen, UA 0 2 0 2, 1 0 E U /dl E U /dl    Bilirubin, UA Negative Negative    Occult Blood, UA Negative Negative    Specific Gravity, UA 1 015 1 003 - 1 030   Urine Microscopic    Collection Time: 06/24/22  7:18 PM   Result Value Ref Range    RBC, UA None Seen None Seen, 1-2 /hpf    WBC, UA None Seen None Seen, 1-2 /hpf    Epithelial Cells Occasional None Seen, Occasional /hpf    Bacteria, UA None Seen None Seen, Occasional /hpf   POCT urinalysis dipstick    Collection Time: 06/24/22  8:11 PM   Result Value Ref Range    Color, UA see results    HS Troponin I 2hr Collection Time: 06/24/22  8:23 PM   Result Value Ref Range    hs TnI 2hr 7 "Refer to ACS Flowchart"- see link ng/L    Delta 2hr hsTnI 0 <20 ng/L   Hemolysis Smear    Collection Time: 06/24/22  8:23 PM   Result Value Ref Range    Hemolysis Smear No Schistocytes or Helmet Cells noted      Imaging: I have personally reviewed pertinent reports  and I have personally reviewed pertinent films in PACS  Code Status:  Level 1 full code  Epic/ Allscripts/Care Everywhere records reviewed:  Yes    * Please Note: Fluency DirectDictation voice to text software may have been used in the creation of this document   **

## 2022-06-25 NOTE — CONSULTS
Oncology Consult Note  Aamir Valle 80 y o  female MRN: [id number]  Unit/Bed#: [facility] 930-01 Encounter: [id number]      Presenting Complaint: Thrombocytopenia    History of Presenting Illness: The patient is a pleasant 70-year-old female who was admitted to the hospital with a 1 day history of increasing shortness of breath and dyspnea on exertion  She also has some dizziness  She also has some abdominal pain  Upon admission her blood pressure was elevated at 223 x 100  She was nonfocal   Blood work at the time revealed a platelet count of 22  This has not been repeated  Previous to this the last blood work I have is from  which showed a normal platelet count  Most recent CBC with differential showed a white count of 5 27 with a hemoglobin of 9 9 and platelet count of 22  Creatinine was normal at 1 29  Liver function test showed a normal AST and ALT  Alk-phos was slightly elevated at 161  The patient herself states she is at baseline  Sitting comfortably in bed  Denies any nausea denies any vomiting denies any diarrhea  Is nonfocal   Does feel somewhat fatigued  The rest of her 14 point review of systems today was negative  Blood work was repeated this morning but is pending  Review of Systems - As stated in the HPI otherwise the fourteen point review of systems was negative  Past Medical History:   Diagnosis Date    Arthritis     Malignant neoplasm of breast ([facility] [address] )     Resolved: Oct 26, 2015    Sciatica     last assessed: 2013       Social History     Socioeconomic History    Marital status:       Spouse name: None    Number of children: None    Years of education: None    Highest education level: None   Occupational History    None   Tobacco Use    Smoking status: Former Smoker     Types: Cigarettes     Quit date: 2000     Years since quittin 4    Smokeless tobacco: Never Used   Substance and Sexual Activity    Alcohol use: Yes     Comment: social     Drug use: No    Sexual activity: None   Other Topics Concern    None   Social History Narrative    Lives with adult children      Social Determinants of Health     Financial Resource Strain: Not on file   Food Insecurity: Not on file   Transportation Needs: Not on file   Physical Activity: Not on file   Stress: Not on file   Social Connections: Not on file   Intimate Partner Violence: Not on file   Housing Stability: Not on file       Family History   Problem Relation Age of Onset    Other Mother         Maternal coagulation defect complicating pregnancy/childbirth/puerperium     Emphysema Father     Other Sister         Pacemaker Placement     Coronary artery disease Brother     Melanoma Daughter        Allergies   Allergen Reactions    Meperidine Nausea Only    Tramadol Nausea Only         Current Facility-Administered Medications:     acetaminophen (TYLENOL) tablet 650 mg, 650 mg, Oral, Q6H PRN, Lieutenant Austin MD    allopurinol (ZYLOPRIM) tablet 100 mg, 100 mg, Oral, Daily, Lieutenant Austin MD, 100 mg at 06/25/22 1027    aluminum-magnesium hydroxide-simethicone (MYLANTA) oral suspension 30 mL, 30 mL, Oral, Q6H PRN, Lieutenant Austin MD    atorvastatin (LIPITOR) tablet 40 mg, 40 mg, Oral, Daily With Dinner, Lieutenant Austin MD    busPIRone (BUSPAR) tablet 10 mg, 10 mg, Oral, TID, Lieutenant Austin MD, 10 mg at 06/25/22 1027    carvedilol (COREG) tablet 12 5 mg, 12 5 mg, Oral, BID With Meals, [facility] Sp[facility], CRNP, 12 5 mg at 06/25/22 1026    cefTRIAXone (ROCEPHIN) 1,000 mg in dextrose 5 % 50 mL IVPB, 1,000 mg, Intravenous, Q24H, Jose R Basilio MD    cholecalciferol (VITAMIN D3) tablet 1,000 Units, 1,000 Units, Oral, Daily, Lieutenant Austin MD, 1,000 Units at 06/25/22 1027    co-enzyme Q-10 capsule 100 mg, 100 mg, Oral, Daily, Lieutenant Austin MD, 100 mg at 06/25/22 1027    cyanocobalamin (VITAMIN B-12) tablet 1,000 mcg, 1,000 mcg, Oral, Daily, Lieutenant Austin MD, 1,000 mcg at 06/25/22 1029   docusate sodium (COLACE) capsule 100 mg, 100 mg, Oral, BID PRN, Nahed Donaldson MD    famotidine (PEPCID) tablet 20 mg, 20 mg, Oral, Daily, Nahed Donaldson MD, 20 mg at 06/25/22 1027    furosemide (LASIX) tablet 20 mg, 20 mg, Oral, Daily PRN, Nahed Donaldson MD    labetalol (NORMODYNE) injection 10 mg, 10 mg, Intravenous, Q6H PRN, Willean Pill, CRNP    levothyroxine tablet 50 mcg, 50 mcg, Oral, Early Morning, Nahed Donaldson MD    lisinopril (ZESTRIL) tablet 20 mg, 20 mg, Oral, Daily, Nahed Donaldson MD, 20 mg at 06/25/22 1027    loratadine (CLARITIN) tablet 10 mg, 10 mg, Oral, Daily, Nahed Donaldson MD, 10 mg at 06/25/22 1027    LORazepam (ATIVAN) tablet 0 5 mg, 0 5 mg, Oral, BID, Nahed Donaldson MD, 0 5 mg at 06/25/22 1027    magnesium oxide (MAG-OX) tablet 400 mg, 400 mg, Oral, Daily, Nahed Donaldson MD, 400 mg at 06/25/22 1031    metroNIDAZOLE (FLAGYL) IVPB (premix) 500 mg 100 mL, 500 mg, Intravenous, Q8H, Harsha Mcconnell MD, Last Rate: 200 mL/hr at 06/25/22 1027, 500 mg at 06/25/22 1027    multivitamin-minerals (CENTRUM) tablet 1 tablet, 1 tablet, Oral, Daily, Nahed Donaldson MD, 1 tablet at 06/25/22 1027    ondansetron [facility]) injection 4 mg, 4 mg, Intravenous, Q6H PRN, Nahed Donaldson MD, 4 mg at 06/24/22 2208    pantoprazole (PROTONIX) EC tablet 40 mg, 40 mg, Oral, Daily, Nahed Donaldson MD, 40 mg at 06/25/22 1030      /74   Pulse 70   Temp (!) 97 3 °F (36 3 °C) (Oral)   Resp 16   Ht 5' 3" (1 6 m)   Wt 98 9 kg (218 lb 0 6 oz)   SpO2 98%   BMI 38 62 kg/m²     General Appearance:    Alert, oriented        Eyes:    PERRL   Ears:    Normal external ear canals, both ears   Nose:   Nares normal, septum midline   Throat:   Mucosa moist  Pharynx without injection      Neck:   Supple       Lungs:     Clear to auscultation bilaterally   Chest Wall:    No tenderness or deformity    Heart:    Regular rate and rhythm       Abdomen:     Soft, non-tender, bowel sounds +, no organomegaly           Extremities:   Extremities no cyanosis or edema       Skin:   no rash or icterus  Lymph nodes:   Cervical, supraclavicular, and axillary nodes normal   Neurologic:   CNII-XII intact, normal strength, sensation and reflexes     Throughout               Recent Results (from the past 48 hour(s))   ECG 12 lead    Collection Time: 06/24/22  4:16 PM   Result Value Ref Range    Ventricular Rate 58 BPM    Atrial Rate 58 BPM    VA Interval 178 ms    QRSD Interval 80 ms    QT Interval 424 ms    QTC Interval 416 ms    P Axis 38 degrees    QRS Axis 19 degrees    T Wave Axis 94 degrees   Comprehensive metabolic panel    Collection Time: 06/24/22  4:45 PM   Result Value Ref Range    Sodium 136 136 - 145 mmol/L    Potassium 4 0 3 5 - 5 3 mmol/L    Chloride 105 100 - 108 mmol/L    CO2 27 21 - 32 mmol/L    ANION GAP 4 4 - 13 mmol/L    BUN 24 5 - 25 mg/dL    Creatinine 1 29 0 60 - 1 30 mg/dL    Glucose 146 (H) 65 - 140 mg/dL    Calcium 9 2 8 3 - 10 1 mg/dL    Corrected Calcium 10 1 8 3 - 10 1 mg/dL    AST 16 5 - 45 U/L    ALT 27 12 - 78 U/L    Alkaline Phosphatase 161 (H) 46 - 116 U/L    Total Protein 7 3 6 4 - 8 2 g/dL    Albumin 2 9 (L) 3 5 - 5 0 g/dL    Total Bilirubin 0 50 0 20 - 1 00 mg/dL    eGFR 36 ml/min/1 73sq m   Blood culture #2    Collection Time: 06/24/22  4:45 PM    Specimen: Arm, Right; Blood   Result Value Ref Range    Blood Culture Received in Microbiology Lab  Culture in Progress      HS Troponin 0hr (reflex protocol)    Collection Time: 06/24/22  4:49 PM   Result Value Ref Range    hs TnI 0hr 7 "Refer to ACS Flowchart"- see link ng/L   CBC and differential    Collection Time: 06/24/22  4:54 PM   Result Value Ref Range    WBC 5 27 4 31 - 10 16 Thousand/uL    RBC 3 24 (L) 3 81 - 5 12 Million/uL    Hemoglobin 9 9 (L) 11 5 - 15 4 g/dL    Hematocrit 30 8 (L) 34 8 - 46 1 %    MCV 95 82 - 98 fL    MCH 30 6 26 8 - 34 3 pg    MCHC 32 1 31 4 - 37 4 g/dL    RDW 13 8 11 6 - 15 1 %    MPV 13 7 (H) 8 9 - 12 7 fL    Platelets 22 (LL) 663 - 390 Thousands/uL    nRBC 0 /100 WBCs    Neutrophils Relative 68 43 - 75 %    Immat GRANS % 0 0 - 2 %    Lymphocytes Relative 23 14 - 44 %    Monocytes Relative 9 4 - 12 %    Eosinophils Relative 0 0 - 6 %    Basophils Relative 0 0 - 1 %    Neutrophils Absolute 3 50 1 85 - 7 62 Thousands/µL    Immature Grans Absolute 0 02 0 00 - 0 20 Thousand/uL    Lymphocytes Absolute 1 23 0 60 - 4 47 Thousands/µL    Monocytes Absolute 0 49 0 17 - 1 22 Thousand/µL    Eosinophils Absolute 0 02 0 00 - 0 61 Thousand/µL    Basophils Absolute 0 01 0 00 - 0 10 Thousands/µL   Procalcitonin    Collection Time: 06/24/22  4:54 PM   Result Value Ref Range    Procalcitonin 0 06 <=0 25 ng/ml   Blood culture #1    Collection Time: 06/24/22  4:54 PM    Specimen: Line, Venous; Blood   Result Value Ref Range    Blood Culture Received in Microbiology Lab  Culture in Progress      Protime-INR    Collection Time: 06/24/22  5:21 PM   Result Value Ref Range    Protime 12 8 11 6 - 14 5 seconds    INR 1 00 0 84 - 1 19   APTT    Collection Time: 06/24/22  5:21 PM   Result Value Ref Range    PTT 26 23 - 37 seconds   D-dimer, quantitative    Collection Time: 06/24/22  5:21 PM   Result Value Ref Range    D-Dimer, Quant 1 90 (H) <0 50 ug/ml FEU   Fibrinogen    Collection Time: 06/24/22  5:21 PM   Result Value Ref Range    Fibrinogen 512 (H) 227 - 495 mg/dL   Fibrin split products    Collection Time: 06/24/22  5:21 PM   Result Value Ref Range    FDP <10 <10   Lactic acid    Collection Time: 06/24/22  5:37 PM   Result Value Ref Range    LACTIC ACID 1 9 0 5 - 2 0 mmol/L   Urine Macroscopic, POC    Collection Time: 06/24/22  7:18 PM   Result Value Ref Range    Color, UA Yellow     Clarity, UA Clear     pH, UA 7 0 4 5 - 8 0    Leukocytes, UA Negative Negative    Nitrite, UA Negative Negative    Protein, UA Trace (A) Negative mg/dl    Glucose, UA Negative Negative mg/dl    Ketones, UA Negative Negative mg/dl    Urobilinogen, UA 0 2 0  2, 1 0 E U /dl E U /dl    Bilirubin, UA Negative Negative    Occult Blood, UA Negative Negative    Specific Gravity, UA 1 015 1 003 - 1 030   Urine Microscopic    Collection Time: 06/24/22  7:18 PM   Result Value Ref Range    RBC, UA None Seen None Seen, 1-2 /hpf    WBC, UA None Seen None Seen, 1-2 /hpf    Epithelial Cells Occasional None Seen, Occasional /hpf    Bacteria, UA None Seen None Seen, Occasional /hpf   POCT urinalysis dipstick    Collection Time: 06/24/22  8:11 PM   Result Value Ref Range    Color, UA see results    HS Troponin I 2hr    Collection Time: 06/24/22  8:23 PM   Result Value Ref Range    hs TnI 2hr 7 "Refer to ACS Flowchart"- see link ng/L    Delta 2hr hsTnI 0 <20 ng/L   Hemolysis Smear    Collection Time: 06/24/22  8:23 PM   Result Value Ref Range    Hemolysis Smear No Schistocytes or Helmet Cells noted          CT abdomen pelvis wo contrast    Result Date: 6/25/2022  Narrative: CT ABDOMEN AND PELVIS WITHOUT IV CONTRAST INDICATION:   RLQ abdominal pain (Age >= 14y) Abdominal pain, acute, nonlocalized unrelieved N/V, R sided abdominal pain  COMPARISON:  None  TECHNIQUE:  CT examination of the abdomen and pelvis was performed without intravenous contrast  This examination was performed without intravenous contrast in the context of the critical nationwide Omnipaque shortage  Axial, sagittal, and coronal 2D reformatted images were created from the source data and submitted for interpretation  Radiation dose length product (DLP) for this visit:  1163 03 mGy-cm   This examination, like all CT scans performed in the [facility], was performed utilizing techniques to minimize radiation dose exposure, including the use of iterative reconstruction and automated exposure control  Enteric contrast was not administered  FINDINGS: ABDOMEN LOWER CHEST:  Atelectasis seen within the lung bases  The heart is mildly enlarged  Small hiatal hernia is seen  LIVER/BILIARY TREE:  Unremarkable  GALLBLADDER:  Increased density within the gallbladder which may represent sludge and stones  SPLEEN:  Unremarkable  PANCREAS:  Unremarkable  ADRENAL GLANDS:  Unremarkable  KIDNEYS/URETERS:  One or more simple renal cyst(s) is noted  Otherwise unremarkable kidneys  No hydronephrosis  STOMACH AND BOWEL:  Colonic diverticulosis with minimal inflammatory changes around the sigmoid colon APPENDIX:  No findings to suggest appendicitis  ABDOMINOPELVIC CAVITY:  No ascites  No pneumoperitoneum  No lymphadenopathy  VESSELS:  Unremarkable for patient's age  PELVIS REPRODUCTIVE ORGANS:  Unremarkable for patient's age  URINARY BLADDER:  Increased density within the urinary bladder likely representing contrast  ABDOMINAL WALL/INGUINAL REGIONS:  Unremarkable  OSSEOUS STRUCTURES:  No acute fracture or destructive osseous lesion  Spinal degenerative changes are noted  Impression: Colonic diverticulosis with minimal inflammatory changes around the sigmoid colon which may represent acute diverticulitis  No drainable fluid collection  Recommend posttreatment colonoscopy to rule out underlying neoplasm  Distended gallbladder with gallstones and sludge  If clinically warranted right upper quadrant sonogram may be obtained for further evaluation The study was marked in EPIC for immediate notification  Workstation performed: [id number]     CTA head and neck with and without contrast    Result Date: 6/24/2022  Narrative: CTA NECK AND BRAIN WITH AND WITHOUT CONTRAST INDICATION: Altered mental status and left facial droop  COMPARISON:   10/17/2006  TECHNIQUE:  Routine CT imaging of the Brain without contrast   Post contrast imaging was performed after administration of iodinated contrast through the neck and brain  Post contrast axial 0 625 mm images timed to opacify the arterial system  3D rendering was performed on an independent workstation  MIP reconstructions performed   Coronal reconstructions were performed of the noncontrast portion of the brain  Radiation dose length product (DLP) for this visit:  1456 9 mGy-cm   This examination, like all CT scans performed in the [facility], was performed utilizing techniques to minimize radiation dose exposure, including the use of iterative  reconstruction and automated exposure control  IV Contrast:  75 mL of iohexol (OMNIPAQUE)  IMAGE QUALITY:   Diagnostic FINDINGS: NONCONTRAST BRAIN PARENCHYMA:  Peripherally calcified left frontal extra-axial mass measuring 2 6 x 1 9 x 2 6 cm  Minimal vasogenic edema posterior to the mass  Chronic lacunar infarcts in the benny  Periventricular and subcortical hypoattenuating foci consistent with microangiopathic disease  No intracranial hemorrhage  VENTRICLES AND EXTRA-AXIAL SPACES:  No hydrocephalus  VISUALIZED ORBITS AND PARANASAL SINUSES:  Intact globes and orbits  Clear paranasal sinuses  CERVICAL VASCULATURE AORTIC ARCH AND GREAT VESSELS: Three-vessel configuration of the aortic origin  No stenosis in the subclavian arteries RIGHT VERTEBRAL ARTERY CERVICAL SEGMENT: Normal origin    The vesselis normall in caliber throughout the neck  LEFT VERTEBRAL ARTERY CERVICAL SEGMENT: Normal origin    The vesselis normall in caliber throughout the neck  RIGHT EXTRACRANIAL CAROTID SEGMENT:  Normal caliber common carotid artery  Calcified plaque at the bifurcation and internal carotid artery origin without significant stenosis  LEFT EXTRACRANIAL CAROTID SEGMENT:  Normal caliber common carotid artery  Calcified plaque at the bifurcation and internal carotid artery origin without significant stenosis  NASCET criteria was used to determine the degree of internal carotid artery diameter stenosis  INTRACRANIAL VASCULATURE INTERNAL CAROTID ARTERIES: Calcified plaque throughout the carotid siphons without hemodynamically significant stenosis  ANTERIOR CIRCULATION: Symmetric A1 segments and anterior cerebral arteries with normal enhancement  Normal anterior communicating artery  Elizabeth Burow MIDDLE CEREBRAL ARTERY CIRCULATION: M1 segment and middle cerebral artery branches demonstrate normal enhancement bilaterally  Elizabeth Burow DISTAL VERTEBRAL ARTERIES: Calcified plaque along the left V4 segment resulting in moderate to severe (60-80%) stenosis  Posterior inferior cerebellar arteries are patent  BASILAR ARTERY: Physiologically small basilar artery  Patent superior cerebellar arteries  POSTERIOR CEREBRAL ARTERIES:Bilateral fetal type posterior cerebral arteries with multifocal mild stenoses  Possible aneurysm measuring 3 mm at the origin of the left posterior communicating artery (PCOM)  VENOUS STRUCTURES: Patent  NON VASCULAR ANATOMY BONY STRUCTURES: No acute osseous abnormality  Elizabeth Burow SOFT TISSUES OF THE NECK: Enlarged heterogeneous left lobe of the thyroid containing cystic nodules measuring up to 1 4 cm  THORACIC INLET: Clear lung apices  Impression: 1  Moderate to severe (60-80%) in the the 4th segment of the left vertebral artery secondary to atherosclerotic plaque  No hemodynamically significant stenosis, dissection or occlusion of the carotid arteries  2   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  [facility]  No hemodynamically significant stenosis or occlusion of the major vessels of the [place]  3   Right frontal extra-axial enhancing, partially calcified 2 6 cm mass suggesting a meningioma  Small mild vasogenic edema along the posterior aspect  Recommend MRI of the brain with gadolinium  4   No evidence of acute infarct, significant mass effect or intracranial hemorrhage  Workstation performed: [id number]     XR chest portable    Result Date: 6/25/2022  Narrative: CHEST INDICATION:  Altered mental status  COMPARISON:  7/8/2014, CT chest 6/10/2008 EXAM PERFORMED/VIEWS:  XR CHEST PORTABLE FINDINGS:  The patient is slightly rotated to the left  Cardiomediastinal silhouette appears unremarkable    Status post median sternotomy  The lungs are clear  No pneumothorax or pleural effusion  Osseous structures appear within normal limits for patient age  Impression: No acute cardiopulmonary disease  Workstation performed: [id number]       Assessment and Plan:  The patient is a pleasant 80-year-old female admitted for some dyspnea on exertion and abdominal pain found to have a platelet count of 22  White count was normal   Hemoglobin was slightly low  Blood work needs to be repeated  If the platelet count is persistently low etiology could be ITP  The patient is not bleeding  If the platelet count comes back under 50 Decadron 40 mg daily for 4 days can be initiated for the assumption of ITP  It is hard to  her baseline as her hemoglobin previously was normal   I do not have a lot of blood work previously  We will give a 4 day course of Decadron if her platelet count on repeat testing comes back under 50  If he does not have a response I would consider bone marrow biopsy  I explained this to the patient  She is agreeable to steroids if needed  Blood work was drawn this morning with still is not back  If her platelet count is under 50 on the repeat blood work my recommendation would be to start Decadron 40 mg either IV or p o  Daily for 4 days  If she does start this she needs to be on GI prophylaxis with a PPI  Please call with any questions  Thank you for the consult  Addendum to above dictation  Patient's platelet count was repeated and was normal   It seems the platelet count of 22 was in error  Primary service is aware  Patient will not get Decadron and does not need any further management of platelets which are actually normal   It seems to have been a lab error

## 2022-06-25 NOTE — CONSULTS
[facility] - Inpatient Consultation  Vida Yeager 80 y o  female MRN: [id number]  Unit/Bed#: [facility] 930-01 Encounter: [id number]    Reason for Consult / Principal Problem:     Abnormal CT, nausea, vomiting    ASSESSMENT & PLAN:      69-year-old female with history of hyperlipidemia, peripheral arterial disease, peripheral neuropathy, obesity, chronic incontinence, osteoarthritis, anxiety, GERD, CAD, hyperuricemia who presented with possible facial droop and altered mental status as well as shortness of breath  GI consultation for abnormal CT findings showing possible diverticulitis as well as nausea  1  Nausea, vomiting - etiology unclear  Possible findings of distended gallbladder with gallstones and sludge noted on CT  However, patient without any significant tenderness in the right upper quadrant on examination  Could be contributing to her nausea although relatively low suspicion for symptomatic biliary pathology as her underlying cause  Elevated alkaline phosphatase is noted  Remainder of LFTs within normal limits  Could be secondary to acute infectious etiology versus possible component of dysmotility if there is truly underlying concern for possible neurologic insult versus PUD versus esophagitis versus other  · Recommend conservative management at this time  · Follow-up right upper quadrant ultrasound ordered by primary team  · Diet as tolerated  · Continue Protonix 40 mg daily  · Antiemetics and supportive care per primary team  · If symptoms fail to improve, can consider further workup  · Rule out underlying neurologic issue or insult at discretion of primary team    2  Abnormal CT - noted colonic diverticulosis on CT with possible minimal inflammatory changes around the sigmoid colon  Patient denies any abdominal pain and without any leukocytosis  Low suspicion for actual diverticulitis at this time    · Because the patient is asymptomatic without any definitive findings indicative of diverticulitis and considering the patient's advanced age and her comorbidities, would defer colonoscopy at this time  · However, if patient becomes symptomatic and/or has continued symptoms or pain or worsening anemia, can re-evaluate the risks and benefits of undergoing endoscopic evaluation  · No indication for antibiotics  · Follow clinically  · Serial abdominal exams    3  Anemia, thrombocytopenia - no signs of overt bleeding  Etiology unclear  Normocytic anemia is noted  Baseline hemoglobin approximately 12 13  Acute thrombocytopenia is also noted  Previously normal platelets  Elevated fibrinogen and D-dimer noted although fibrin split products is normal and hemolysis smear is negative  ITP? No evidence of underlying chronic liver disease  · Hematology consulted; follow-up recommendations  · Management per primary team  · Could consider endoscopic evaluation if anemia persists or if there is overt bleeding; however, patient is at high risk for janice-procedural and anesthesia complications given her advanced age and co-morbidities  · As such, would defer for now  · Iron supplementation if indicated at discretion of primary team  ______________________________________________________________________    HISTORY OF PRESENT ILLNESS:  27-year-old female with history of hyperlipidemia, peripheral arterial disease, peripheral neuropathy, obesity, chronic incontinence, osteoarthritis, anxiety, GERD, CAD, hyperuricemia who presented with possible facial droop and altered mental status as well as shortness of breath  Patient reportedly had been complaining of increasing shortness of breath with exertion over the last day or so  Yesterday morning, patient was also reportedly complaining of dizziness and per family, the patient seemed off  The patient was also complaining of nausea    The patient's family called EMS and the patient was initially evaluated for possible facial droop, as part of her workup, patient underwent CT imaging which found possible localized diverticulitis in the sigmoid colon with mild inflammatory changes seen  GI consultation requested for abnormal CT findings and nausea  Information regarding HPI was obtained from the medical chart  Patient is a poor historian with limitations due to possible perfusion as well as hearing loss  Currently, the patient does complain of ongoing nausea  She denies any abdominal pain  REVIEW OF SYSTEMS:    Unable to obtain accurate ROS  Patient is poor historian and has hearing loss  Possible confusion as well  Does complain of nausea/vomiting  Historical Information   Past Medical History:   Diagnosis Date    Arthritis     Malignant neoplasm of breast ([address] )     Resolved:  Oct 26, 2015    Sciatica     last assessed: 2013     Past Surgical History:   Procedure Laterality Date    BREAST BIOPSY      BREAST LUMPECTOMY      CARDIAC SURGERY      CATARACT EXTRACTION      CORONARY ARTERY BYPASS GRAFT      ROTATOR CUFF REPAIR      TONSILLECTOMY      TOTAL ABDOMINAL HYSTERECTOMY       Social History   Social History     Substance and Sexual Activity   Alcohol Use Yes    Comment: social      Social History     Substance and Sexual Activity   Drug Use No     Social History     Tobacco Use   Smoking Status Former Smoker    Types: Cigarettes    Quit date:     Years since quittin 4   Smokeless Tobacco Never Used     Family History   Problem Relation Age of Onset    Other Mother         Maternal coagulation defect complicating pregnancy/childbirth/puerperium     Emphysema Father     Other Sister         Pacemaker Placement     Coronary artery disease Brother     Melanoma Daughter        Meds/Allergies   Medications Prior to Admission   Medication    LORazepam (ATIVAN) 0 5 mg tablet    allopurinol (ZYLOPRIM) 100 mg tablet    aspirin 81 mg chewable tablet    busPIRone (BUSPAR) 10 mg tablet    cetirizine (ZyrTEC) 10 mg tablet    Cholecalciferol (D3-1000) 1000 units tablet    Coenzyme Q10 (CO Q 10) 100 MG CAPS    cyanocobalamin (VITAMIN B-12) 1,000 mcg tablet    famotidine (PEPCID) 20 mg tablet    furosemide (LASIX) 20 mg tablet    ketoconazole (NIZORAL) 2 % cream    levothyroxine 50 mcg tablet    lisinopril (ZESTRIL) 40 mg tablet    Magnesium 100 MG TABS    meloxicam (MOBIC) 7 5 mg tablet    metoprolol succinate (TOPROL-XL) 200 MG 24 hr tablet    Multiple Vitamins-Minerals (PRESERVISION AREDS) CAPS    pantoprazole (PROTONIX) 40 mg tablet     Current Facility-Administered Medications   Medication Dose Route Frequency    acetaminophen (TYLENOL) tablet 650 mg  650 mg Oral Q6H PRN    allopurinol (ZYLOPRIM) tablet 100 mg  100 mg Oral Daily    aluminum-magnesium hydroxide-simethicone (MYLANTA) oral suspension 30 mL  30 mL Oral Q6H PRN    aspirin chewable tablet 81 mg  81 mg Oral Daily    atorvastatin (LIPITOR) tablet 40 mg  40 mg Oral Daily With Dinner    busPIRone (BUSPAR) tablet 10 mg  10 mg Oral TID    cefTRIAXone (ROCEPHIN) 1,000 mg in dextrose 5 % 50 mL IVPB  1,000 mg Intravenous Q24H    cholecalciferol (VITAMIN D3) tablet 1,000 Units  1,000 Units Oral Daily    co-enzyme Q-10 capsule 100 mg  100 mg Oral Daily    cyanocobalamin (VITAMIN B-12) tablet 1,000 mcg  1,000 mcg Oral Daily    docusate sodium (COLACE) capsule 100 mg  100 mg Oral BID PRN    famotidine (PEPCID) tablet 20 mg  20 mg Oral Daily    furosemide (LASIX) tablet 20 mg  20 mg Oral Daily PRN    hydrALAZINE (APRESOLINE) injection 10 mg  10 mg Intravenous Q4H PRN    levothyroxine tablet 50 mcg  50 mcg Oral Early Morning    lisinopril (ZESTRIL) tablet 20 mg  20 mg Oral Daily    loratadine (CLARITIN) tablet 10 mg  10 mg Oral Daily    LORazepam (ATIVAN) tablet 0 5 mg  0 5 mg Oral BID    magnesium oxide (MAG-OX) tablet 400 mg  400 mg Oral Daily    metoprolol succinate (TOPROL-XL) 24 hr tablet 100 mg  100 mg Oral TID    metroNIDAZOLE (FLAGYL) IVPB (premix) 500 mg 100 mL  500 mg Intravenous Q8H    multivitamin-minerals (CENTRUM) tablet 1 tablet  1 tablet Oral Daily    ondansetron (ZOFRAN) injection 4 mg  4 mg Intravenous Q6H PRN    pantoprazole (PROTONIX) EC tablet 40 mg  40 mg Oral Daily     Allergies   Allergen Reactions    Meperidine Nausea Only    Tramadol Nausea Only       PHYSICAL EXAM:      Objective   Blood pressure 164/77, pulse 64, temperature (!) 97 3 °F (36 3 °C), temperature source Oral, resp  rate 18, height 5' 3" (1 6 m), weight 98 9 kg (218 lb 0 6 oz), SpO2 96 %  Body mass index is 38 62 kg/m²  No intake or output data in the 24 hours ending 06/25/22 0844    General Appearance:   Alert, cooperative, elderly female, appears uncomfortable due to nausea   HEENT:   Normocephalic, atraumatic, anicteric     Neck:   Supple, symmetrical, trachea midline   Lungs:   Equal chest rise, respirations unlabored    Heart:   Regular rate and rhythm   Abdomen:   Soft, non-tender, non-distended; normal bowel sounds; no masses, no organomegaly    Rectal:   Deferred    Extremities:   No cyanosis, clubbing or edema    Neuro:    Moves all 4 extremities; oriented x4 but very hard of hearing, some confusion during conversation    Skin:   No jaundice, rashes, or lesions      LAB RESULTS:     Admission on 06/24/2022   Component Date Value    WBC 06/24/2022 5 27     RBC 06/24/2022 3 24 (A)    Hemoglobin 06/24/2022 9 9 (A)    Hematocrit 06/24/2022 30 8 (A)    MCV 06/24/2022 95     MCH 06/24/2022 30 6     MCHC 06/24/2022 32 1     RDW 06/24/2022 13 8     MPV 06/24/2022 13 7 (A)    Platelets 70/68/0017 22 (A)    nRBC 06/24/2022 0     Neutrophils Relative 06/24/2022 68     Immat GRANS % 06/24/2022 0     Lymphocytes Relative 06/24/2022 23     Monocytes Relative 06/24/2022 9     Eosinophils Relative 06/24/2022 0     Basophils Relative 06/24/2022 0     Neutrophils Absolute 06/24/2022 3 50     Immature Grans Absolute 06/24/2022 0 02     Lymphocytes Absolute 06/24/2022 1 23     Monocytes Absolute 06/24/2022 0 49     Eosinophils Absolute 06/24/2022 0 02     Basophils Absolute 06/24/2022 0 01     Sodium 06/24/2022 136     Potassium 06/24/2022 4 0     Chloride 06/24/2022 105     CO2 06/24/2022 27     ANION GAP 06/24/2022 4     BUN 06/24/2022 24     Creatinine 06/24/2022 1 29     Glucose 06/24/2022 146 (A)    Calcium 06/24/2022 9 2     Corrected Calcium 06/24/2022 10 1     AST 06/24/2022 16     ALT 06/24/2022 27     Alkaline Phosphatase 06/24/2022 161 (A)    Total Protein 06/24/2022 7 3     Albumin 06/24/2022 2 9 (A)    Total Bilirubin 06/24/2022 0 50     eGFR 06/24/2022 36     LACTIC ACID 06/24/2022 1 9     Procalcitonin 06/24/2022 0 06     Protime 06/24/2022 12 8     INR 06/24/2022 1 00     PTT 06/24/2022 26     Blood Culture 06/24/2022 Received in Microbiology Lab  Culture in Progress   Blood Culture 06/24/2022 Received in Microbiology Lab  Culture in Progress       hs TnI 0hr 06/24/2022 7     hs TnI 2hr 06/24/2022 7     Delta 2hr hsTnI 06/24/2022 0     Color, UA 06/24/2022 see results     Color, UA 06/24/2022 Yellow     Clarity, UA 06/24/2022 Clear     pH, UA 06/24/2022 7 0     Leukocytes, UA 06/24/2022 Negative     Nitrite, UA 06/24/2022 Negative     Protein, UA 06/24/2022 Trace (A)    Glucose, UA 06/24/2022 Negative     Ketones, UA 06/24/2022 Negative     Urobilinogen, UA 06/24/2022 0 2     Bilirubin, UA 06/24/2022 Negative     Occult Blood, UA 06/24/2022 Negative     Specific Gravity, UA 06/24/2022 1 015     RBC, UA 06/24/2022 None Seen     WBC, UA 06/24/2022 None Seen     Epithelial Cells 06/24/2022 Occasional     Bacteria, UA 06/24/2022 None Seen     Hemolysis Smear 06/24/2022 No Schistocytes or Helmet Cells noted     D-Dimer, Quant 06/24/2022 1 90 (A)    Fibrinogen 06/24/2022 512 (A)    FDP 06/24/2022 <10        RADIOLOGY RESULTS: I have personally reviewed pertinent imaging studies  LITZY Cottrell  Chief Gastroenterology Fellow  [facility]  Available on [email or web address]  [email or web address]

## 2022-06-25 NOTE — CASE MANAGEMENT
Case Management Assessment & Discharge Planning Note    Patient name So Grullon  Location [facility] 930/[facility] 930-01 MRN [id number]  : 1930 Date 2022       Current Admission Date: 2022  Current Admission Diagnosis:Uncontrolled hypertension   Patient Active Problem List    Diagnosis Date Noted    Brain mass 2022    Thrombocytopenia ([facility] ) 2022    Dizziness 2022    Visual hallucination 2020    Encounter for follow-up examination after completed treatment for malignant neoplasm 2019    History of right breast cancer 2018    Macular degeneration 2017    Mixed stress and urge urinary incontinence 2017    Primary localized osteoarthritis of both knees 2016    PAD (peripheral artery disease) ([facility] ) 2016    Hyperuricemia 10/09/2015    Mild vitamin D deficiency 2015    Spinal stenosis 2014    Gait disturbance 2014    Generalized osteoarthritis 2013    Peripheral neuropathy 2013    Other chronic pain 10/24/2012    Uncontrolled hypertension 2012    Obesity 2012    Hyperlipidemia 2012    Generalized anxiety disorder 2012    Insomnia 2012    Primary hypothyroidism 2012    3-vessel coronary artery disease 2012    Depression 2012    Esophageal reflux 2012      LOS (days): 1  Geometric Mean LOS (GMLOS) (days):   Days to GMLOS:     OBJECTIVE:    Risk of Unplanned Readmission Score: 8 27         Current admission status: Inpatient       Preferred Pharmacy:   [facility] , [address] [place] [address]  [address]  [place] [address]  Phone: [phone] Fax: [phone] - [facility], [address]  [facility] [address]  Phone: [phone] Fax: [phone]    Primary Care Provider: Tiff Anderson DO    Primary Insurance: MEDICARE  Secondary Insurance: AET[employer]    ASSESSMENT:  Active Health Care Proxies    There are no active Health Care Proxies on file  Advance Directives  Does patient have a [address]?: Yes  Does patient have Advance Directives?: Yes  Advance Directives: Power of  for health care  Primary Contact: Katie Florian    Patient Information  Admitted from[de-identified] Home  Mental Status: Alert  During Assessment patient was accompanied by: Not accompanied during assessment  Assessment information provided by[de-identified] Daughter  Primary Caregiver: Family  Caregiver's Name[de-identified] Thao Lao (Daughter)  Caregiver's Relationship to Patient[de-identified] Family Member  Caregiver's Telephone Number[de-identified] [phone]  Support Systems: [address] [facility] of Residence: [address] [facility] do you live in?: [address] [facility] entry access options   Select all that apply : Stairs  Number of steps to enter home : 3  Type of Current Residence: [address] [place] home (Per pt's dtr she has a 1st floor set up)  Upon entering residence, is there a bedroom on the main floor (no further steps)?: Yes  Upon entering residence, is there a bathroom on the main floor (no further steps)?: Yes  In the last 12 months, was there a time when you were not able to pay the mortgage or rent on time?: No  In the last 12 months, how many places have you lived?: 1  In the last 12 months, was there a time when you did not have a steady place to sleep or slept in a shelter (including now)?: No  Homeless/housing insecurity resource given?: N/A  Living Arrangements: Other (Comment) (Pt lives with dtr and son in law)    Activities of Daily Living Prior to Admission  Functional Status: Assistance  Completes ADLs independently?: No  Level of ADL dependence: Assistance  Does patient use assisted devices?: Yes  Assisted Devices (DME) used: Radha Navas  Does patient currently own DME?: Yes  What DME does the patient currently own?: [employer] Wheelchair  Does patient have a history of Outpatient Therapy (PT/OT)?: No  Does the patient have a history of Short-Term Rehab?: No  Does patient have a history of HHC?: No  Does patient currently have Shalom Liu?: No         Patient Information Continued  Income Source: Pension/custodial  Does patient have prescription coverage?: Yes  Within the past 12 months, you worried that your food would run out before you got the money to buy more : Never true  Within the past 12 months, the food you bought just didn't last and you didn't have money to get more : Never true  Food insecurity resource given?: N/A  Does patient receive dialysis treatments?: No  Does patient have a history of substance abuse?: No  Does patient have a history of Mental Health Diagnosis?: Yes (Generalized anxiety disorder)  Has patient received inpatient treatment related to mental health in the last 2 years?: No         Means of Transportation  Means of Transport to Appts[de-identified] Family transport  In the past 12 months, has lack of transportation kept you from medical appointments or from getting medications?: No  In the past 12 months, has lack of transportation kept you from meetings, work, or from getting things needed for daily living?: No  Was application for public transport provided?: N/A        DISCHARGE DETAILS:    Discharge planning discussed with[de-identified] Pt's dtr  Freedom of Choice: Yes  Comments - Freedom of Choice: pending recs  CM contacted family/caregiver?: Yes           Patient/caregiver received discharge checklist   Content reviewed  Patient/caregiver encouraged to participate in discharge plan of care prior to discharge home  CM reviewed d/c planning process including the following: identifying help at home, patient preference for d/c planning needs, Discharge Lounge, [facility] Meds to Bed program, availability of treatment team to discuss questions or concerns patient and/or family may have regarding understanding medications and recognizing signs and symptoms once discharged    CM also encouraged patient to follow up with all recommended appointments after discharge  Patient advised of importance for patient and family to participate in managing patients medical well being

## 2022-06-25 NOTE — PLAN OF CARE
Problem: MOBILITY - ADULT  Goal: Maintain or return to baseline ADL function  Description: INTERVENTIONS:  -  Assess patient's ability to carry out ADLs; assess patient's baseline for ADL function and identify physical deficits which impact ability to perform ADLs (bathing, care of mouth/teeth, toileting, grooming, dressing, etc )  - Assess/evaluate cause of self-care deficits   - Assess range of motion  - Assess patient's mobility; develop plan if impaired  - Assess patient's need for assistive devices and provide as appropriate  - Encourage maximum independence but intervene and supervise when necessary  - Involve family in performance of ADLs  - Assess for home care needs following discharge   - Consider OT consult to assist with ADL evaluation and planning for discharge  - Provide patient education as appropriate  6/25/2022 0206 by Vanesa Mitchell RN  Outcome: Progressing  6/25/2022 0206 by Vanesa Mitchell RN  Outcome: Progressing     Problem: Potential for Falls  Goal: Patient will remain free of falls  Description: INTERVENTIONS:  - Educate patient/family on patient safety including physical limitations  - Instruct patient to call for assistance with activity   - Consult OT/PT to assist with strengthening/mobility   - Keep Call bell within reach  - Keep bed low and locked with side rails adjusted as appropriate  - Keep care items and personal belongings within reach  - Initiate and maintain comfort rounds  - Make Fall Risk Sign visible to staff  - Apply yellow socks and bracelet for high fall risk patients  - Consider moving patient to room near nurses station  6/25/2022 0206 by Vanesa Mitchell RN  Outcome: Progressing  6/25/2022 0206 by Vanesa Mitchell RN  Outcome: Progressing     Problem: PAIN - ADULT  Goal: Verbalizes/displays adequate comfort level or baseline comfort level  Description: Interventions:  - Encourage patient to monitor pain and request assistance  - Assess pain using appropriate pain scale  - Administer analgesics based on type and severity of pain and evaluate response  - Implement non-pharmacological measures as appropriate and evaluate response  - Consider cultural and social influences on pain and pain management  - Notify physician/advanced practitioner if interventions unsuccessful or patient reports new pain  Outcome: Progressing     Problem: INFECTION - ADULT  Goal: Absence or prevention of progression during hospitalization  Description: INTERVENTIONS:  - Assess and monitor for signs and symptoms of infection  - Monitor lab/diagnostic results  - Monitor all insertion sites, i e  indwelling lines, tubes, and drains  - Monitor endotracheal if appropriate and nasal secretions for changes in amount and color  - Thompson appropriate cooling/warming therapies per order  - Administer medications as ordered  - Instruct and encourage patient and family to use good hand hygiene technique  - Identify and instruct in appropriate isolation precautions for identified infection/condition  Outcome: Progressing  Goal: Absence of fever/infection during neutropenic period  Description: INTERVENTIONS:  - Monitor WBC    Outcome: Progressing     Problem: SAFETY ADULT  Goal: Maintain or return to baseline ADL function  Description: INTERVENTIONS:  -  Assess patient's ability to carry out ADLs; assess patient's baseline for ADL function and identify physical deficits which impact ability to perform ADLs (bathing, care of mouth/teeth, toileting, grooming, dressing, etc )  - Assess/evaluate cause of self-care deficits   - Assess range of motion  - Assess patient's mobility; develop plan if impaired  - Assess patient's need for assistive devices and provide as appropriate  - Encourage maximum independence but intervene and supervise when necessary  - Involve family in performance of ADLs  - Assess for home care needs following discharge   - Consider OT consult to assist with ADL evaluation and planning for discharge  - Provide patient education as appropriate  6/25/2022 0206 by Vanesa Mitchell RN  Outcome: Progressing  6/25/2022 0206 by Vanesa Mitchell RN  Outcome: Progressing  Goal: Maintains/Returns to pre admission functional level  Description: INTERVENTIONS:  - Perform BMAT or MOVE assessment daily    - Set and communicate daily mobility goal to care team and patient/family/caregiver     - Collaborate with rehabilitation services on mobility goals if consulted  - Out of bed for toileting  - Record patient progress and toleration of activity level   6/25/2022 0206 by Vanesa Mitchell RN  Outcome: Progressing    Goal: Patient will remain free of falls  Description: INTERVENTIONS:  - Educate patient/family on patient safety including physical limitations  - Instruct patient to call for assistance with activity   - Consult OT/PT to assist with strengthening/mobility   - Keep Call bell within reach  - Keep bed low and locked with side rails adjusted as appropriate  - Keep care items and personal belongings within reach  - Initiate and maintain comfort rounds  - Make Fall Risk Sign visible to staff  - Apply yellow socks and bracelet for high fall risk patients  - Consider moving patient to room near nurses station  6/25/2022 0206 by Vanesa Mitchell RN  Outcome: Progressing  6/25/2022 0206 by Vanesa Mitchell RN  Outcome: Progressing     Problem: DISCHARGE PLANNING  Goal: Discharge to home or other facility with appropriate resources  Description: INTERVENTIONS:  - Identify barriers to discharge w/patient and caregiver  - Arrange for needed discharge resources and transportation as appropriate  - Identify discharge learning needs (meds, wound care, etc )  - Arrange for interpretive services to assist at discharge as needed  - Refer to Case Management Department for coordinating discharge planning if the patient needs post-hospital services based on physician/advanced practitioner order or complex needs related to functional status, cognitive ability, or social support system  Outcome: Progressing     Problem: Knowledge Deficit  Goal: Patient/family/caregiver demonstrates understanding of disease process, treatment plan, medications, and discharge instructions  Description: Complete learning assessment and assess knowledge base    Interventions:  - Provide teaching at level of understanding  - Provide teaching via preferred learning methods  Outcome: Progressing

## 2022-06-25 NOTE — ASSESSMENT & PLAN NOTE
Brain mass noted in CTA; may be meningioma  Recommended MRI; will order tomorrow  Neuro checks every 4 hours  Depending on MRI; may need further workup

## 2022-06-25 NOTE — DISCHARGE INSTRUCTIONS
Meningioma   WHAT YOU NEED TO KNOW:   A meningioma is a tumor that starts in the meninges of the brain and spinal cord  The meninges are the tissues that cover the brain and spinal cord  They prevent germs and other substances from entering the brain and spinal cord  Most meningiomas are slow-growing and benign (not cancer)  DISCHARGE INSTRUCTIONS:   Call your local emergency number (911 in the [facility] [facility]) if:   You have any of the following signs of a stroke:      Numbness or drooping on one side of your face     Weakness in an arm or leg    Confusion or difficulty speaking    Dizziness, a severe headache, or vision loss      Return to the emergency department if:   You vomit repeatedly, and cannot keep any food or liquids down  You have a severe headache, or you feel dizzy  Call your doctor or oncologist if:   You have a fever  You have questions or concerns about your condition or care  Medicines:   Medicines  may be given to kill the tumor cells and decrease the size of the meningioma  Take your medicine as directed  Contact your healthcare provider if you think your medicine is not helping or if you have side effects  Tell him or her if you are allergic to any medicine  Keep a list of the medicines, vitamins, and herbs you take  Include the amounts, and when and why you take them  Bring the list or the pill bottles to follow-up visits  Carry your medicine list with you in case of an emergency  Manage your symptoms:   Drink liquids as directed  Ask how much liquid to drink each day and which liquids are best for you  If you have nausea or diarrhea from treatment, extra liquids may help decrease your risk for dehydration  Eat healthy foods  Healthy foods include fruits, vegetables, whole-grain breads, low-fat dairy products, beans, lean meats, and fish  This may help you feel better during treatment and decrease side effects  You may need to change what you eat during treatment   Do not eat foods or drink liquids that cause gas, such as cabbage, beans, onions, or soft drinks  A nutritionist may help to plan the best meals and snacks for you  Be physically active, as directed  Exercise may improve your energy levels and appetite  Your healthcare provider can help you create a physical activity plan  Follow up with your doctor or oncologist as directed:  Write down your questions so you remember to ask them during your visits  © Copyright [employer] 2022 Information is for End User's use only and may not be sold, redistributed or otherwise used for commercial purposes  All illustrations and images included in CareNotes® are the copyrighted property of A D A M , Inc  or [facility] Dex Herrera   The above information is an  only  It is not intended as medical advice for individual conditions or treatments  Talk to your doctor, nurse or pharmacist before following any medical regimen to see if it is safe and effective for you

## 2022-06-25 NOTE — H&P
Bryan [address] 12/24/1930, 80 y o  female MRN: [id number]  Unit/Bed#: ED 21 Encounter: [id number]  Primary Care Provider: Gutierrez Glez DO   Date and time admitted to hospital: 6/24/2022  4:04 PM    * Uncontrolled hypertension  Assessment & Plan  Patient on lisinopril 40 mg daily  Furosemide 20 mg as needed for swelling, metoprolol succinate 100 mg 3 times daily  Patient eating pretzels and may be causing elevated uncontrolled blood pressure  Would most likely benefit from new echocardiogram as last 1 was 2014  No history of aortic stenosis  Hydralazine 10 mg x 1 intravenous and Q 4 as needed for greater than 180  May benefit from Lasix 40 mg intravenous x1  Cardiology consult  Noted new thrombocytopenia may be secondary to prolonged uncontrolled hypertension  Recheck CBC in the morning  Metabolic profile  Dizziness  Assessment & Plan  Patient complaining of dizziness for the past 24 hours; blood pressure elevated and noted moderate to severe atherosclerotic plaque at the left vertebral artery  TSH with hemoglobin A1c and lipid profile for tomorrow  Occupational and physical therapy consult with case management  Thrombocytopenia (HCC)  Assessment & Plan  Thrombocytopenia may be secondary to elevated blood pressure; will put on hold VTE prophylaxis  Hematology consult  Trend platelet count  Brain mass  Assessment & Plan  Brain mass noted in CTA; may be meningioma  Recommended MRI; will order tomorrow  Neuro checks every 4 hours  Depending on MRI; may need further workup  Primary hypothyroidism  Assessment & Plan  Continue levothyroxine 50 mcg daily   TSH  Mild vitamin D deficiency  Assessment & Plan  On cholecalciferol  Hyperuricemia  Assessment & Plan  Continue allopurinol 100 mg daily      Hyperlipidemia  Assessment & Plan  Due to atherosclerotic plaque; will check lipid profile tomorrow and may benefit starting Lipitor 40 mg           VTE Prophylaxis: Pharmacologic VTE Prophylaxis contraindicated due to Thrombocytopenia  / sequential compression device   Code Status: Level 1 - Full Code needs to be reconfirmed  POLST: There is no POLST form on file for this patient (pre-hospital)    Anticipated Length of Stay:  Patient will be admitted on an Inpatient basis with an anticipated length of stay of  greater than 2 midnights  Justification for Hospital Stay: Please see detailed plans noted above  Chief Complaint:     Dizziness with shortness of breath  History of Present Illness:  Srinivas Scott is a 80 y o  female who has past medical history significant for hyperlipidemia, peripheral artery disease, peripheral neuropathy, obesity, chronic incontinence, generalized osteoarthritis, anxiety disorder, gastroesophageal reflux disease, CAD with triple-vessel coronary disease, hyperuricemia, hyperlipidemia, vitamin-D deficiency, hypothyroidism, who comes in due to an 1 day history of increasing shortness of breath with dyspnea on exertion  She denies having any chest pain  Likewise the same time, she noted that since this morning she is experiencing mild dizziness somewhat the floor of the room is raised  No room spinning and no tinnitus  No fever  No cough or cold  Likewise, patient states that she has been having shortness of breath with mild exertion  That said, she was brought to the emergency room by EMS who thought that she might have a facial droop earlier  Her blood pressure has been elevated and currently was seen to be 223/100; she does not have any shortness of breath and is not in any cardiorespiratory distress  Patient does not have any manifestation of focal neurologic signs and no change in mental status  No drifting  Currently, patient is sitting on stretcher and is communicative although hard of hearing but she can answer questions appropriately    Patient has admitted to be eating pretzels lately  Review of Systems:    Constitutional:  Denies fever or chills   Eyes:  Denies change in visual acuity   HENT:  Denies nasal congestion or sore throat   Respiratory:  Denies cough or fever but with shortness of breath only on exertion  No increased edema  Cardiovascular:  Denies chest pain or edema   GI:  Denies abdominal pain, nausea, vomiting, bloody stools or diarrhea   :  Denies dysuria   Musculoskeletal:  Denies back pain or joint pain   Integument:  Denies rash   Neurologic:  Denies headache, focal weakness or sensory changes   Endocrine:  Denies polyuria or polydipsia   Lymphatic:  Denies swollen glands   Psychiatric:  Denies depression or anxiety     Past Medical and Surgical History:   Past Medical History:   Diagnosis Date    Arthritis     Malignant neoplasm of breast ([facility] [address] )     Resolved: Oct 26, 2015    Sciatica     last assessed: 2013     Past Surgical History:   Procedure Laterality Date    BREAST BIOPSY      BREAST LUMPECTOMY      CARDIAC SURGERY      CATARACT EXTRACTION      CORONARY ARTERY BYPASS GRAFT  2009    ROTATOR CUFF REPAIR      TONSILLECTOMY      TOTAL ABDOMINAL HYSTERECTOMY         Meds/Allergies:  (Not in a hospital admission)      Allergies: Allergies   Allergen Reactions    Meperidine Nausea Only    Tramadol Nausea Only     History:  Marital Status:    Occupation:  She is retired  Patient Pre-hospital Living Situation:  Lives at home  Patient Pre-hospital Level of Mobility:  Ambulatory  Patient Pre-hospital Diet Restrictions:  Regular; eating salty foods    Substance Use History:   Social History     Substance and Sexual Activity   Alcohol Use Yes    Comment: social      Social History     Tobacco Use   Smoking Status Former Smoker    Types: Cigarettes    Quit date:     Years since quittin 4   Smokeless Tobacco Never Used     Social History     Substance and Sexual Activity   Drug Use No       Family History:  Family History   Problem Relation Age of Onset    Other Mother         Maternal coagulation defect complicating pregnancy/childbirth/puerperium     Emphysema Father     Other Sister         Pacemaker Placement     Coronary artery disease Brother     Melanoma Daughter        Physical Exam:     Vitals:   Blood Pressure: (!) 198/92 (06/24/22 2101)  Pulse: 71 (06/24/22 1920)  Temperature: 97 5 °F (36 4 °C) (06/24/22 1923)  Temp Source: Oral (06/24/22 1923)  Respirations: 18 (06/24/22 1611)  Weight - Scale: 98 9 kg (218 lb) (06/24/22 1611)  SpO2: 97 % (06/24/22 1920)    Constitutional:  Well developed, well nourished, morbidly obese, no acute distress, non-toxic appearance   Eyes:  PERRL, conjunctiva normal   HENT:  Atraumatic, external ears normal, nose normal, oropharynx moist, no pharyngeal exudates  Neck- normal range of motion, no tenderness, supple   Respiratory:  No respiratory distress, bronchial breath sounds,, no rales, no wheezing   Cardiovascular:  Normal rate, normal rhythm, no murmurs, no gallops, no rubs   GI:  Soft, nondistended, globular, normal bowel sounds, nontender, no organomegaly, no mass, no rebound, no guarding   :  No costovertebral angle tenderness   Musculoskeletal:  No pitting edema, no tenderness, no deformities  Back- no tenderness  Integument:  Well hydrated, no rash   Lymphatic:  No lymphadenopathy noted   Neurologic:  Alert &awake, communicative, CN 2-12 normal, normal motor function, normal sensory function, no focal deficits noted   Psychiatric:  Speech and behavior appropriate       Lab Results: I have personally reviewed pertinent reports        Results from last 7 days   Lab Units 06/24/22  1654   WBC Thousand/uL 5 27   HEMOGLOBIN g/dL 9 9*   HEMATOCRIT % 30 8*   PLATELETS Thousands/uL 22*   NEUTROS PCT % 68   LYMPHS PCT % 23   MONOS PCT % 9   EOS PCT % 0     Results from last 7 days   Lab Units 06/24/22  1645   POTASSIUM mmol/L 4 0   CHLORIDE mmol/L 105   CO2 mmol/L 27   BUN mg/dL 24   CREATININE mg/dL 1 29   CALCIUM mg/dL 9 2   ALK PHOS U/L 161*   ALT U/L 27   AST U/L 16     Results from last 7 days   Lab Units 06/24/22  1721   INR  1 00           Imaging: Chest x-ray personally reviewed with features of pulmonary congestion    CTA head and neck with and without contrast    Result Date: 6/24/2022  Narrative: CTA NECK AND BRAIN WITH AND WITHOUT CONTRAST INDICATION: Altered mental status and left facial droop  COMPARISON:   10/17/2006  TECHNIQUE:  Routine CT imaging of the Brain without contrast   Post contrast imaging was performed after administration of iodinated contrast through the neck and brain  Post contrast axial 0 625 mm images timed to opacify the arterial system  3D rendering was performed on an independent workstation  MIP reconstructions performed  Coronal reconstructions were performed of the noncontrast portion of the brain  Radiation dose length product (DLP) for this visit:  1456 9 mGy-cm   This examination, like all CT scans performed in the [facility], was performed utilizing techniques to minimize radiation dose exposure, including the use of iterative  reconstruction and automated exposure control  IV Contrast:  75 mL of iohexol (OMNIPAQUE)  IMAGE QUALITY:   Diagnostic FINDINGS: NONCONTRAST BRAIN PARENCHYMA:  Peripherally calcified left frontal extra-axial mass measuring 2 6 x 1 9 x 2 6 cm  Minimal vasogenic edema posterior to the mass  Chronic lacunar infarcts in the benny  Periventricular and subcortical hypoattenuating foci consistent with microangiopathic disease  No intracranial hemorrhage  VENTRICLES AND EXTRA-AXIAL SPACES:  No hydrocephalus  VISUALIZED ORBITS AND PARANASAL SINUSES:  Intact globes and orbits  Clear paranasal sinuses  CERVICAL VASCULATURE AORTIC ARCH AND GREAT VESSELS: Three-vessel configuration of the aortic origin  No stenosis in the subclavian arteries RIGHT VERTEBRAL ARTERY CERVICAL SEGMENT: Normal origin    The vesselis normall in caliber throughout the neck  LEFT VERTEBRAL ARTERY CERVICAL SEGMENT: Normal origin    The vesselis normall in caliber throughout the neck  RIGHT EXTRACRANIAL CAROTID SEGMENT:  Normal caliber common carotid artery  Calcified plaque at the bifurcation and internal carotid artery origin without significant stenosis  LEFT EXTRACRANIAL CAROTID SEGMENT:  Normal caliber common carotid artery  Calcified plaque at the bifurcation and internal carotid artery origin without significant stenosis  NASCET criteria was used to determine the degree of internal carotid artery diameter stenosis  INTRACRANIAL VASCULATURE INTERNAL CAROTID ARTERIES: Calcified plaque throughout the carotid siphons without hemodynamically significant stenosis  ANTERIOR CIRCULATION: Symmetric A1 segments and anterior cerebral arteries with normal enhancement  Normal anterior communicating artery  Gloria Ibis MIDDLE CEREBRAL ARTERY CIRCULATION: M1 segment and middle cerebral artery branches demonstrate normal enhancement bilaterally  Gloria Oxly DISTAL VERTEBRAL ARTERIES: Calcified plaque along the left V4 segment resulting in moderate to severe (60-80%) stenosis  Posterior inferior cerebellar arteries are patent  BASILAR ARTERY: Physiologically small basilar artery  Patent superior cerebellar arteries  POSTERIOR CEREBRAL ARTERIES:Bilateral fetal type posterior cerebral arteries with multifocal mild stenoses  Possible aneurysm measuring 3 mm at the origin of the left posterior communicating artery (PCOM)  VENOUS STRUCTURES: Patent  NON VASCULAR ANATOMY BONY STRUCTURES: No acute osseous abnormality  [employer] SOFT TISSUES OF THE NECK: Enlarged heterogeneous left lobe of the thyroid containing cystic nodules measuring up to 1 4 cm  THORACIC INLET: Clear lung apices  Impression: 1  Moderate to severe (60-80%) in the the 4th segment of the left vertebral artery secondary to atherosclerotic plaque    No hemodynamically significant stenosis, dissection or occlusion of the carotid arteries  2   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  Recommend nonemergent neurosciences Center consultation  No hemodynamically significant stenosis or occlusion of the major vessels of the [place]  3   Right frontal extra-axial enhancing, partially calcified 2 6 cm mass suggesting a meningioma  Small mild vasogenic edema along the posterior aspect  Recommend MRI of the brain with gadolinium  4   No evidence of acute infarct, significant mass effect or intracranial hemorrhage  Workstation performed: [id number]         ** Please Note: Dragon 360 Dictation voice to text software was used in the creation of this document   **

## 2022-06-25 NOTE — QUICK NOTE
Notified by RN that pt was still experiencing nausea after receiving zofran and a one time dose of 12 5mg of phenergan  On my arrival to the bedside, pt was dry heaving and vomited clear liquid  On palpation she was tender along the right side of her abdomen  Will order another one time dose of phenergan and order CTAP

## 2022-06-25 NOTE — ASSESSMENT & PLAN NOTE
Patient on lisinopril 40 mg daily  Furosemide 20 mg as needed for swelling, metoprolol succinate 100 mg 3 times daily  Patient eating pretzels and may be causing elevated uncontrolled blood pressure  Would most likely benefit from new echocardiogram as last 1 was 2014  No history of aortic stenosis  Hydralazine 10 mg x 1 intravenous and Q 4 as needed for greater than 180  May benefit from Lasix 40 mg intravenous x1  Cardiology consult  Noted new thrombocytopenia may be secondary to prolonged uncontrolled hypertension  Recheck CBC in the morning  Metabolic profile

## 2022-06-25 NOTE — ASSESSMENT & PLAN NOTE
Brain mass noted in CTA; may be meningioma  Recommended MRI; plan from hospitalist yesterday was to order to today  Will order MRI today  Neuro checks every 4 hours

## 2022-06-26 PROBLEM — I63.9 STROKE (CEREBRUM) (HCC): Status: ACTIVE | Noted: 2022-01-01

## 2022-06-26 NOTE — PROGRESS NOTES
[address] [facility]  Progress Note - Sophie Castaneda 12/24/1930, 80 y o  female MRN: [id number]  Unit/Bed#: [facility] 930-01 Encounter: [id number]  Primary Care Provider: Jeffery Duarte DO   Date and time admitted to hospital: 6/24/2022  4:04 PM    Dizziness  Assessment & Plan  Patient complaining of dizziness for the past 24 hours  Now resolved  Will monitor closely       Thrombocytopenia (HCC)  Assessment & Plan  Likely platelet count from initial CBC likely error  Brain mass  Assessment & Plan  Brain mass noted in CTA; may be meningioma  Recommended MRI; plan from hospitalist yesterday was to order to today  Will order MRI today  Neuro checks every 4 hours  Primary hypothyroidism  Assessment & Plan  Continue levothyroxine 50 mcg daily   TSH  Mild vitamin D deficiency  Assessment & Plan  On cholecalciferol  Hyperuricemia  Assessment & Plan  Continue allopurinol 100 mg daily  Hyperlipidemia  Assessment & Plan  Due to atherosclerotic plaque; will check lipid profile tomorrow and may benefit starting Lipitor 40 mg     * Uncontrolled hypertension  Assessment & Plan  Appreciate cardiology input  Metoprolol changed to coreg  Will continue to monitor blood pressure  /73              VTE Pharmacologic Prophylaxis:   Moderate Risk (Score 3-4) - Pharmacological DVT Prophylaxis Ordered: heparin  Patient Centered Rounds: I performed bedside rounds with nursing staff today  Discussions with Specialists or Other Care Team Provider: Discussed with nursing   Education and Discussions with Family / Patient: tried family   Time Spent for Care: 30 minutes  More than 50% of total time spent on counseling and coordination of care as described above      Current Length of Stay: 2 day(s)  Current Patient Status: Inpatient   Certification Statement: The patient will continue to require additional inpatient hospital stay due to stroke needs stroke pathway   Discharge Plan: Anticipate discharge in 48-72 hrs to rehab facility  Code Status: Level 1 - Full Code    Subjective:   Patient seen and examined   Feeling "okay"    Objective:     Vitals:   Temp (24hrs), Av 3 °F (36 3 °C), Min:97 2 °F (36 2 °C), Max:97 4 °F (36 3 °C)    Temp:  [97 2 °F (36 2 °C)-97 4 °F (36 3 °C)] 97 3 °F (36 3 °C)  HR:  [70-83] 70  Resp:  [16-18] 18  BP: (138-168)/(63-79) 144/73  SpO2:  [92 %-96 %] 95 %  Body mass index is 38 44 kg/m²  Input and Output Summary (last 24 hours): Intake/Output Summary (Last 24 hours) at 2022 1602  Last data filed at 2022 0134  Gross per 24 hour   Intake 380 ml   Output 464 ml   Net -84 ml       Physical Exam:   Physical Exam  Constitutional:       General: She is not in acute distress  Appearance: She is not ill-appearing, toxic-appearing or diaphoretic  HENT:      Nose: Nose normal       Mouth/Throat:      Mouth: Mucous membranes are moist    Eyes:      General: No scleral icterus  Right eye: No discharge  Left eye: No discharge  Pupils: Pupils are equal, round, and reactive to light  Cardiovascular:      Rate and Rhythm: Normal rate  Abdominal:      General: There is no distension  Palpations: There is no mass  Tenderness: There is no abdominal tenderness  There is no right CVA tenderness, left CVA tenderness, guarding or rebound  Hernia: No hernia is present  Musculoskeletal:         General: No swelling or tenderness  Right lower leg: No edema  Skin:     Capillary Refill: Capillary refill takes less than 2 seconds  Coloration: Skin is pale  Skin is not jaundiced  Findings: No lesion  Neurological:      General: No focal deficit present  Mental Status: She is alert  Cranial Nerves: No cranial nerve deficit  Sensory: No sensory deficit  Motor: No weakness        Coordination: Coordination normal       Gait: Gait normal       Deep Tendon Reflexes: Reflexes normal  Psychiatric:         Mood and Affect: Mood normal           Additional Data:     Labs:  Results from last 7 days   Lab Units 06/26/22  0807   WBC Thousand/uL 13 32*   HEMOGLOBIN g/dL 13 2   HEMATOCRIT % 39 4   PLATELETS Thousands/uL 252   NEUTROS PCT % 76*   LYMPHS PCT % 15   MONOS PCT % 8   EOS PCT % 1     Results from last 7 days   Lab Units 06/26/22  0807   SODIUM mmol/L 133*   POTASSIUM mmol/L 3 9   CHLORIDE mmol/L 99*   CO2 mmol/L 29   BUN mg/dL 26*   CREATININE mg/dL 1 46*   ANION GAP mmol/L 5   CALCIUM mg/dL 9 0   ALBUMIN g/dL 3 0*   TOTAL BILIRUBIN mg/dL 0 66   ALK PHOS U/L 135*   ALT U/L 20   AST U/L 15   GLUCOSE RANDOM mg/dL 126     Results from last 7 days   Lab Units 06/25/22  1602   INR  1 02         Results from last 7 days   Lab Units 06/25/22  1333   HEMOGLOBIN A1C % 6 0*     Results from last 7 days   Lab Units 06/24/22  1737 06/24/22  1654   LACTIC ACID mmol/L 1 9  --    PROCALCITONIN ng/ml  --  0 06       Lines/Drains:  Invasive Devices  Report    Peripheral Intravenous Line  Duration           Peripheral IV 06/24/22 Left;Ventral (anterior) Forearm 1 day    Peripheral IV 06/24/22 Proximal;Right;Ventral (anterior) Antecubital 1 day                  Telemetry:  Telemetry Orders (From admission, onward)             48 Hour Telemetry Monitoring  Continuous x 48 hours        References:    Telemetry Guidelines   Question:  Reason for 48 Hour Telemetry  Answer:  Acute CVA (<24 hrs old, hemispheric strokes, selected brainstem strokes, cardiac arrhythmias)                 Telemetry Reviewed: Normal Sinus Rhythm  Indication for Continued Telemetry Use: Arrthymias requiring medical therapy             Imaging: No pertinent imaging reviewed  Recent Cultures (last 7 days):   Results from last 7 days   Lab Units 06/24/22  1654 06/24/22  1645   BLOOD CULTURE  No Growth at 24 hrs  No Growth at 24 hrs         Last 24 Hours Medication List:   Current Facility-Administered Medications   Medication Dose Route Frequency Provider Last Rate    acetaminophen  650 mg Oral Q6H PRN Minus MD [facility]      allopurinol  100 mg Oral Daily Minus [facility]      aluminum-magnesium hydroxide-simethicone  30 mL Oral Q6H PRN Minus [facility]      aspirin  81 mg Oral Daily Soco Orellana MD      atorvastatin  40 mg Oral QPM Soco Orellana MD      busPIRone  10 mg Oral TID Minus [facility]      carvedilol  12 5 mg Oral BID With Meals RAO Bashir      cefTRIAXone  1,000 mg Intravenous Q24H Soco Orellana MD 1,000 mg (06/26/22 0854)    cholecalciferol  1,000 Units Oral Daily Minus [facility]      co-enzyme Q-10  100 mg Oral Daily Minus [facility]      vitamin B-12  1,000 mcg Oral Daily Minus MD [facility]      docusate sodium  100 mg Oral BID PRN Minus [facility]      famotidine  20 mg Oral Daily Minus MD [facility]      furosemide  20 mg Oral Daily PRN Minus [facility]      labetalol  10 mg Intravenous Q6H PRN RAO Bashir      levothyroxine  50 mcg Oral Early Morning Minus MD [facility]      lisinopril  20 mg Oral Daily Minus MD [facility]      loratadine  10 mg Oral Daily Minus [facility]      LORazepam  0 5 mg Oral BID Minus [facility]      magnesium oxide  400 mg Oral Daily Minus MD [facility]      metroNIDAZOLE  500 mg Intravenous Audrey Hummel  mg (06/26/22 0745)    multivitamin-minerals  1 tablet Oral Daily Minus MD [facility]      ondansetron  4 mg Intravenous Q6H PRN Minus [facility]      pantoprazole  40 mg Oral Daily Minus [facility]          Today, Patient Was Seen By: Soco Orellana MD    **Please Note: This note may have been constructed using a voice recognition system  **

## 2022-06-26 NOTE — ASSESSMENT & PLAN NOTE
Assessment:  Raquel Godwin is a 80 y o  female  found to have a punctate foci of acute infarction in the right hippocampus and posterior periventricular white matter on MRI  Workup: In the ED, Presenting BP Blood Pressure: (!) 209/110  MRI: Punctate foci of acute infarction in the right hippocampus and posterior periventricular white matter    Risk factors: hypertension, diabetes, hyperlipidemia and coronary artery disease    Impression: The pts stroke is most likely related to Small Vessel/microvascular disease in the setting of a patient with significant small vessel disease risk factors including uncontrolled hypertension, diabetes, hyperlipidemia, and coronary artery disease  Plan:  - Recommend risk factor reduction including blood pressure control, diabetes control and cholesterol control  - ASA 81 mg daily, can consider switching to Plavix  - Atorvastatin 40 mg q d      Lab Results   Component Value Date    HGBA1C 6 0 (H) 06/25/2022     Lab Results   Component Value Date    CHOLESTEROL 261 (H) 06/25/2022    TRIG 169 (H) 06/25/2022    HDL 57 06/25/2022    LDLCALC 170 (H) 06/25/2022

## 2022-06-26 NOTE — QUICK NOTE
GI Quick Note:    Chart reviewed  Repeat labs showed normalization of Hb and platelets  Suspect previous lab was erroneous  RUQ US with some cholelithiasis and biliary sludge with no sonographic evidence of acute cholecystitis  Bile duct is normal in caliber and negative for choledocholithiasis  Patient is asymptomatic at this time with no abdominal pain  Nausea/vomiting resolved  As noted in consultation, no indication for colonoscopy at this time  Low suspicion for diverticulitis as patient had no abdominal pain  No further workup indicated for asymptomatic cholelithiasis  Supportive care per primary team  Okay for discharge from GI standpoint  Patient can follow-up with GI as needed  LITZY Bergman  Chief Gastroenterology Fellow  [facility]  Available on [email or web address]

## 2022-06-26 NOTE — ED ATTENDING ATTESTATION
6/24/2022  I saw and evaluated the patient  I have discussed the patient with the resident physician and agree with the resident's findings, assessment and plan as documented in the resident physician's note, unless otherwise documented below  All available laboratory and imaging studies were reviewed by myself  I was present for key portions of any procedure(s) performed by the resident and I was immediately available to provide assistance  I agree with the current assessment done in the Emergency Department  I have conducted an independent evaluation of this patient  Emergency Department Note- Florencio Sandoval 80 y o  female MRN: [id number]    Unit/Bed#: 21 Encounter: [id number]    CC: Confusion    Florencio Sandoval is a 80 y o  female with past medical history hyperlipidemia, breast cancer, presenting with altered mental status  History obtained from EMS, intact clinically from patient's family  Patient resides with her family  This afternoon, they found patient to be confused and off  Given this, EMS was called for evaluation  To EMS, patient appeared to have left-sided facial droop, no other neurologic deficits  Blood glucose was in 170s  On arrival, patient is awake, alert, answering questions  She reports nausea but denies other symptoms:  No fever, no headache, no chest pain, no shortness of breath  No abdominal pain, nausea, vomiting, or diarrhea  REVIEW OF SYSTEMS    Constitutional: denies fevers, chills  Visual/Eyes: no changes in vision  HENT: no rhinorrhea, no sore throat  Patient is hard of hearing    Cardiac: no chest pain  Respiratory: no shortness of breath, no cough  GI:  Nausea, no abdominal pain, vomiting, or diarrhea  : no burning with urination  Heme/Onc: no easy bruising  Endocrine: no diabetes  Neuro:  Possible left-sided facial droop, otherwise, no focal weakness or numbness, no headache    Ten systems reviewed and negative unless otherwise noted in HPI and above    PAST MEDICAL HISTORY  Past Medical History:   Diagnosis Date    Arthritis     Malignant neoplasm of breast ([address] )     Resolved: Oct 26, 2015    Sciatica     last assessed: July 2, 2013       SURGICAL HISTORY  Past Surgical History:   Procedure Laterality Date    BREAST BIOPSY      BREAST LUMPECTOMY      CARDIAC SURGERY      CATARACT EXTRACTION      CORONARY ARTERY BYPASS GRAFT  2009    ROTATOR CUFF REPAIR      TONSILLECTOMY      TOTAL ABDOMINAL HYSTERECTOMY         FAMILY HISTORY  Family History   Problem Relation Age of Onset    Other Mother         Maternal coagulation defect complicating pregnancy/childbirth/puerperium     Emphysema Father     Other Sister         Pacemaker Placement     Coronary artery disease Brother     Melanoma Daughter         CURRENT MEDICATIONS  No current facility-administered medications on file prior to encounter       Current Outpatient Medications on File Prior to Encounter   Medication Sig    LORazepam (ATIVAN) 0 5 mg tablet Take 0 5 mg by mouth 2 (two) times a day    allopurinol (ZYLOPRIM) 100 mg tablet Take 1 tablet (100 mg total) by mouth daily    aspirin 81 mg chewable tablet Chew 1 tablet daily    busPIRone (BUSPAR) 10 mg tablet Take 1 tablet (10 mg total) by mouth 3 (three) times a day    cetirizine (ZyrTEC) 10 mg tablet Take 10 mg by mouth daily    Cholecalciferol (D3-1000) 1000 units tablet Take 1,000 Units by mouth daily    Coenzyme Q10 (CO Q 10) 100 MG CAPS Take by mouth    cyanocobalamin (VITAMIN B-12) 1,000 mcg tablet Take by mouth    famotidine (PEPCID) 20 mg tablet Take 1 tablet (20 mg total) by mouth 2 (two) times a day    furosemide (LASIX) 20 mg tablet TAKE 1 TABLET(20 MG) BY MOUTH DAILY AS NEEDED FOR SWELLING    ketoconazole (NIZORAL) 2 % cream Apply topically daily    levothyroxine 50 mcg tablet Take 1 tablet (50 mcg total) by mouth daily    lisinopril (ZESTRIL) 40 mg tablet Take 0 5 tablets (20 mg total) by mouth daily    Magnesium 100 MG TABS Take by mouth    meloxicam (MOBIC) 7 5 mg tablet TAKE 1 TABLET(7 5 MG) BY MOUTH DAILY    metoprolol succinate (TOPROL-XL) 200 MG 24 hr tablet Take 0 5 tablets (100 mg total) by mouth 3 (three) times a day    Multiple Vitamins-Minerals (PRESERVISION AREDS) CAPS Take by mouth    pantoprazole (PROTONIX) 40 mg tablet Take 1 tablet (40 mg total) by mouth daily       ALLERGIES  Allergies   Allergen Reactions    Meperidine Nausea Only    Tramadol Nausea Only       SOCIAL HISTORY  Social History     Socioeconomic History    Marital status:      Spouse name: None    Number of children: None    Years of education: None    Highest education level: None   Occupational History    None   Tobacco Use    Smoking status: Former Smoker     Types: Cigarettes     Quit date:      Years since quittin 4    Smokeless tobacco: Never Used   Substance and Sexual Activity    Alcohol use: Yes     Comment: social     Drug use: No    Sexual activity: None   Other Topics Concern    None   Social History Narrative    Lives with adult children      Social Determinants of Health     Financial Resource Strain: Not on file   Food Insecurity: No Food Insecurity    Worried About Running Out of Food in the Last Year: Never true    Laury of Food in the Last Year: Never true   Transportation Needs: No Transportation Needs    Lack of Transportation (Medical): No    Lack of Transportation (Non-Medical):  No   Physical Activity: Not on file   Stress: Not on file   Social Connections: Not on file   Intimate Partner Violence: Not on file   Housing Stability: Low Risk     Unable to Pay for Housing in the Last Year: No    Number of Places Lived in the Last Year: 1    Unstable Housing in the Last Year: No       PHYSICAL EXAM  /79   Pulse 73   Temp (!) 97 3 °F (36 3 °C)   Resp 18   Ht 5' 3" (1 6 m)   Wt 98 9 kg (218 lb 0 6 oz)   SpO2 92%   BMI 38 62 kg/m²   Vital signs and nursing notes reviewed    CONSTITUTIONAL: female appearing stated age resting in bed, in no acute distress  Patient is quite hard of hearing  HEENT: atraumatic, normocephalic  Sclera anicteric, conjunctiva are not injected  Moist oral mucosa  CARDIOVASCULAR/CHEST: RRR, no M/R/G  2+ radial pulses  PULMONARY: Breathing comfortably on RA  Breath sounds are equal and clear to auscultation  ABDOMEN: non-distended  BS present, normoactive  Non-tender  MSK: moves all extremities, no deformities, 2+ pitting bilateral lower extremity peripheral edema, no calf asymmetry  NEURO: Awake, alert, and oriented x 3  Face symmetric  Moves all extremities spontaneously  No focal neurologic deficits  SKIN: Warm, appears well-perfused  MENTAL STATUS: Normal affect     NIHSS is 0  DIAGNOSTIC STUDIES  Results Reviewed     Procedure Component Value Units Date/Time    Blood culture #1 [[id number]] Collected: 06/24/22 1654    Lab Status: Preliminary result Specimen: Blood from Line, Venous Updated: 06/25/22 2203     Blood Culture No Growth at 24 hrs  Blood culture #2 [[id number]] Collected: 06/24/22 1645    Lab Status: Preliminary result Specimen: Blood from Arm, Right Updated: 06/25/22 2203     Blood Culture No Growth at 24 hrs  D-dimer, quantitative [[id number]]  (Abnormal) Collected: 06/24/22 1721    Lab Status: Final result Specimen: Blood from Arm, Right Updated: 06/24/22 2106     D-Dimer, Quant 1 90 ug/ml FEU     Narrative: In the evaluation for possible pulmonary embolism, in the appropriate (Well's Score of 4 or less) patient, the age adjusted d-dimer cutoff for this patient can be calculated as:    Age x 0 01 (in ug/mL) for Age-adjusted D-dimer exclusion threshold for a patient over 50 years      Fibrinogen [[id number]]  (Abnormal) Collected: 06/24/22 1721    Lab Status: Final result Specimen: Blood from Arm, Right Updated: 06/24/22 2106     Fibrinogen 512 mg/dL     Hemolysis Smear [[id number]] Collected: 06/24/22 2023    Lab Status: Final result Specimen: Arm, Left Updated: 06/24/22 2045     Hemolysis Smear No Schistocytes or Helmet Cells noted    Lactate dehydrogenase, isoenzymes [[id number]] Collected: 06/24/22 2023    Lab Status: In process Specimen: Blood from Arm, Left Updated: 06/24/22 2035    Haptoglobin [[id number]] Collected: 06/24/22 2023    Lab Status:  In process Specimen: Blood from Arm, Left Updated: 06/24/22 2035    POCT urinalysis dipstick [[id number]]  (Normal) Resulted: 06/24/22 2011    Lab Status: Final result Specimen: Urine Updated: 06/24/22 2012     Color, UA see results    Urine Microscopic [[id number]]  (Normal) Collected: 06/24/22 1918    Lab Status: Final result Specimen: Urine, Other Updated: 06/24/22 1941     RBC, UA None Seen /hpf      WBC, UA None Seen /hpf      Epithelial Cells Occasional /hpf      Bacteria, UA None Seen /hpf     Urine Macroscopic, POC [[id number]]  (Abnormal) Collected: 06/24/22 1918    Lab Status: Final result Specimen: Urine Updated: 06/24/22 1920     Color, UA Yellow     Clarity, UA Clear     pH, UA 7 0     Leukocytes, UA Negative     Nitrite, UA Negative     Protein, UA Trace mg/dl      Glucose, UA Negative mg/dl      Ketones, UA Negative mg/dl      Urobilinogen, UA 0 2 E U /dl      Bilirubin, UA Negative     Occult Blood, UA Negative     Specific Gravity, UA 1 015    Narrative:      CLINITEK RESULT    CBC and differential [[id number]]  (Abnormal) Collected: 06/24/22 1654    Lab Status: Final result Specimen: Blood from Arm, Left Updated: 06/24/22 1914     WBC 5 27 Thousand/uL      RBC 3 24 Million/uL      Hemoglobin 9 9 g/dL      Hematocrit 30 8 %      MCV 95 fL      MCH 30 6 pg      MCHC 32 1 g/dL      RDW 13 8 %      MPV 13 7 fL      Platelets 22 Thousands/uL      nRBC 0 /100 WBCs      Neutrophils Relative 68 %      Immat GRANS % 0 %      Lymphocytes Relative 23 %      Monocytes Relative 9 %      Eosinophils Relative 0 %      Basophils Relative 0 %      Neutrophils Absolute 3 50 Thousands/µL Immature Grans Absolute 0 02 Thousand/uL      Lymphocytes Absolute 1 23 Thousands/µL      Monocytes Absolute 0 49 Thousand/µL      Eosinophils Absolute 0 02 Thousand/µL      Basophils Absolute 0 01 Thousands/µL     Lactic acid [[id number]]  (Normal) Collected: 06/24/22 1737    Lab Status: Final result Specimen: Blood from Arm, Left Updated: 06/24/22 1814     LACTIC ACID 1 9 mmol/L     Narrative:      Result may be elevated if tourniquet was used during collection      Protime-INR [[id number]]  (Normal) Collected: 06/24/22 1721    Lab Status: Final result Specimen: Blood from Arm, Right Updated: 06/24/22 1800     Protime 12 8 seconds      INR 1 00    APTT [[id number]]  (Normal) Collected: 06/24/22 1721    Lab Status: Final result Specimen: Blood from Arm, Right Updated: 06/24/22 1800     PTT 26 seconds     Procalcitonin [[id number]]  (Normal) Collected: 06/24/22 1654    Lab Status: Final result Specimen: Blood from Arm, Left Updated: 06/24/22 1737     Procalcitonin 0 06 ng/ml     HS Troponin 0hr (reflex protocol) [[id number]]  (Normal) Collected: 06/24/22 1649    Lab Status: Final result Specimen: Blood from Arm, Right Updated: 06/24/22 1734     hs TnI 0hr 7 ng/L     Comprehensive metabolic panel [[id number]]  (Abnormal) Collected: 06/24/22 1645    Lab Status: Final result Specimen: Blood from Arm, Right Updated: 06/24/22 1721     Sodium 136 mmol/L      Potassium 4 0 mmol/L      Chloride 105 mmol/L      CO2 27 mmol/L      ANION GAP 4 mmol/L      BUN 24 mg/dL      Creatinine 1 29 mg/dL      Glucose 146 mg/dL      Calcium 9 2 mg/dL      Corrected Calcium 10 1 mg/dL      AST 16 U/L      ALT 27 U/L      Alkaline Phosphatase 161 U/L      Total Protein 7 3 g/dL      Albumin 2 9 g/dL      Total Bilirubin 0 50 mg/dL      eGFR 36 ml/min/1 73sq m     Narrative:      Bob guidelines for Chronic Kidney Disease (CKD):     Stage 1 with normal or high GFR (GFR > 90 mL/min/1 73 square meters)    Stage 2 Mild CKD (GFR = 60-89 mL/min/1 73 square meters)    Stage 3A Moderate CKD (GFR = 45-59 mL/min/1 73 square meters)    Stage 3B Moderate CKD (GFR = 30-44 mL/min/1 73 square meters)    Stage 4 Severe CKD (GFR = 15-29 mL/min/1 73 square meters)    Stage 5 End Stage CKD (GFR <15 mL/min/1 73 square meters)  Note: GFR calculation is accurate only with a steady state creatinine    UA w Reflex to Microscopic w Reflex to Culture [[id number]]     Lab Status: No result Specimen: Urine           CT abdomen pelvis wo contrast   Final Result      Colonic diverticulosis with minimal inflammatory changes around the sigmoid colon which may represent acute diverticulitis  No drainable fluid collection  Recommend posttreatment colonoscopy to rule out underlying neoplasm  Distended gallbladder with gallstones and sludge  If clinically warranted right upper quadrant sonogram may be obtained for further evaluation      The study was marked in EPIC for immediate notification  Workstation performed: [id number]         CTA head and neck with and without contrast   Final Result      1  Moderate to severe (60-80%) in the the 4th segment of the left vertebral artery secondary to atherosclerotic plaque  No hemodynamically significant stenosis, dissection or occlusion of the carotid arteries  2   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  Recommend nonemergent neurosciences Center consultation  No hemodynamically significant stenosis or occlusion of the major vessels of the [place]  3   Right frontal extra-axial enhancing, partially calcified 2 6 cm mass suggesting a meningioma  Small mild vasogenic edema along the posterior aspect  Recommend MRI of the brain with gadolinium  4   No evidence of acute infarct, significant mass effect or intracranial hemorrhage  Workstation performed: [id number]         XR chest portable   Final Result      No acute cardiopulmonary disease                    Workstation performed: [id number]             PROCEDURES  ECG 12 Lead Documentation Only    Date/Time: 6/24/2022 4:20 PM  Performed by: Belvie Holter, MD  Authorized by: Belvie Holter, MD     Comments:      Sinus bradycardia, ventricular rate 58, UT interval 170, QRS 80, , normal axis, nonspecific ST segment changes in high lateral leads, no STEMI, Q-waves in inferior leads suggestive of age-indeterminate inferior infarct, no significant change from prior EKG dated 07/08/2014  ED COURSE  Medications   ondansetron (ZOFRAN) injection 4 mg (4 mg Intravenous Given 6/24/22 1704)   iohexol (OMNIPAQUE) 350 MG/ML injection (MULTI-DOSE) 75 mL (75 mL Intravenous Given 6/24/22 1818)   furosemide (LASIX) injection 40 mg (40 mg Intravenous Given 6/24/22 2235)   pantoprazole (PROTONIX) injection 40 mg (40 mg Intravenous Given 6/24/22 256)     35-year-old female presenting with nausea, reported been confused, and possible left-sided facial droop not appreciated on exam at present  Vital signs reviewed, hypertensive 209/110, afebrile, not tachycardic or hypoxic  Differential diagnosis includes hypertensive urgency versus emergency, stroke, infarction, myocardial infarction, versus another etiology of symptoms  Given altered mental status, we did obtain blood cultures, however, do not believe patient has sepsis since she has no leukocytosis and no fever or hypothermia  EKG obtained, as reviewed by me, as above, no significant change from prior  Labs reveal essentially unremarkable CMP, but reveal CBC without anemia with hemoglobin of 9 9, as well as thrombocytopenia with platelets of 22  There is no note of schistocytes, however, given report of confusion, and now anemia with thrombocytopenia, differential diagnosis does include hemolytic anemia, DIC, versus another hematologic process  Rest of labs reveal UA with trace protein, no rbc's, white blood cells, or bacteria    CT head and neck angiography obtained, revealing left vertebral artery stenosis, as well as a 2 6 cm right frontal mass suggesting meningioma with possible small mild vasogenic edema with recommendation for MRI  Resident physician reached out to hematology  They recommend obtaining hemolysis smear, fibrinogen, D-dimer, fibrin split products, lactate dehydrogenase, and haptoglobin as a part of the evaluation  Patient admitted to Internal Medicine for further care  CLINICAL IMPRESSION  Final diagnoses:    Thrombocytopenia ([address] )   Ambulatory dysfunction   Shortness of breath       Current Discharge Medication List      CONTINUE these medications which have NOT CHANGED    Details   LORazepam (ATIVAN) 0 5 mg tablet Take 0 5 mg by mouth 2 (two) times a day      allopurinol (ZYLOPRIM) 100 mg tablet Take 1 tablet (100 mg total) by mouth daily  Qty: 90 tablet, Refills: 0    Associated Diagnoses: Secondary hypertension; Gout, unspecified cause, unspecified chronicity, unspecified site      aspirin 81 mg chewable tablet Chew 1 tablet daily      busPIRone (BUSPAR) 10 mg tablet Take 1 tablet (10 mg total) by mouth 3 (three) times a day  Qty: 270 tablet, Refills: 0    Associated Diagnoses: Insomnia due to medical condition      cetirizine (ZyrTEC) 10 mg tablet Take 10 mg by mouth daily      Cholecalciferol (D3-1000) 1000 units tablet Take 1,000 Units by mouth daily      Coenzyme Q10 (CO Q 10) 100 MG CAPS Take by mouth      cyanocobalamin (VITAMIN B-12) 1,000 mcg tablet Take by mouth      famotidine (PEPCID) 20 mg tablet Take 1 tablet (20 mg total) by mouth 2 (two) times a day  Qty: 180 tablet, Refills: 1    Associated Diagnoses: Gastroesophageal reflux disease without esophagitis      furosemide (LASIX) 20 mg tablet TAKE 1 TABLET(20 MG) BY MOUTH DAILY AS NEEDED FOR SWELLING  Qty: 90 tablet, Refills: 1    Associated Diagnoses: Edema of both lower extremities      ketoconazole (NIZORAL) 2 % cream Apply topically daily  Qty: 60 g, Refills: 0    Associated Diagnoses: Seborrheic dermatitis      levothyroxine 50 mcg tablet Take 1 tablet (50 mcg total) by mouth daily  Qty: 90 tablet, Refills: 3    Associated Diagnoses: Acquired hypothyroidism      lisinopril (ZESTRIL) 40 mg tablet Take 0 5 tablets (20 mg total) by mouth daily  Qty: 90 tablet, Refills: 1    Associated Diagnoses: Hypertension, unspecified type      Magnesium 100 MG TABS Take by mouth      meloxicam (MOBIC) 7 5 mg tablet TAKE 1 TABLET(7 5 MG) BY MOUTH DAILY  Qty: 30 tablet, Refills: 0    Associated Diagnoses: Generalized osteoarthritis; Primary localized osteoarthritis of both knees      metoprolol succinate (TOPROL-XL) 200 MG 24 hr tablet Take 0 5 tablets (100 mg total) by mouth 3 (three) times a day  Qty: 135 tablet, Refills: 3    Associated Diagnoses: Benign essential hypertension      Multiple Vitamins-Minerals (PRESERVISION AREDS) CAPS Take by mouth      pantoprazole (PROTONIX) 40 mg tablet Take 1 tablet (40 mg total) by mouth daily  Qty: 90 tablet, Refills: 1    Associated Diagnoses: Gastroesophageal reflux disease without esophagitis

## 2022-06-26 NOTE — PLAN OF CARE
Problem: MOBILITY - ADULT  Goal: Maintains/Returns to pre admission functional level  Description: INTERVENTIONS:  - Perform BMAT or MOVE assessment daily    - Set and communicate daily mobility goal to care team and patient/family/caregiver  - Collaborate with rehabilitation services on mobility goals if consulted  - Out of bed for toileting  - Record patient progress and toleration of activity level   Outcome: Progressing     Problem: SAFETY ADULT  Goal: Maintains/Returns to pre admission functional level  Description: INTERVENTIONS:  - Perform BMAT or MOVE assessment daily    - Set and communicate daily mobility goal to care team and patient/family/caregiver     - Collaborate with rehabilitation services on mobility goals if consulted  - Out of bed for toileting  - Record patient progress and toleration of activity level   Outcome: Progressing

## 2022-06-26 NOTE — SPEECH THERAPY NOTE
Speech Language/Pathology  Speech/Language Pathology  Assessment    Patient Name: Yara Hirsch  DHMYI'M Date: 6/26/2022     Problem List  Principal Problem:    Uncontrolled hypertension  Active Problems:    Hyperlipidemia    Hyperuricemia    Mild vitamin D deficiency    Primary hypothyroidism    Brain mass    Thrombocytopenia ([facility] [address] )    Dizziness    Past Medical History  Past Medical History:   Diagnosis Date    Arthritis     Malignant neoplasm of breast ([facility] [address] )     Resolved: Oct 26, 2015    Sciatica     last assessed: July 2, 2013     Past Surgical History  Past Surgical History:   Procedure Laterality Date    BREAST BIOPSY      BREAST LUMPECTOMY      CARDIAC SURGERY      CATARACT EXTRACTION      CORONARY ARTERY BYPASS GRAFT  2009    ROTATOR CUFF REPAIR      TONSILLECTOMY      TOTAL ABDOMINAL HYSTERECTOMY       Summary: Pt was on baseline regular/thin diet (cardiac) prior to SLP consult  Due to imaging results and slight concerns re: medication administration from nursing (i e  only able to take 1 pill at a time), full evaluation vs  screen completed  Pt presents with suspected baseline swallow function  Reflexive cough x1 noted on first straw sip of coffee; pt reported it was still hot  (Pt prefers use of straws ) SLP encouraged small, single sips, which pt independently demonstrated for the remainder of PO boluses  No oropharyngeal dysphagia nor overt s/sx of aspiration observed  Recommend continuing baseline, regular/thin diet (cardiac)  No further ST indicated at this time  HPI: Yara Hirsch is a 80 y o  female with PMH significant for HLD, breast cancer coming in today with complaint of altered mental status  Per EMS, the patient was last known well was sometime last evening  Upon awakening (unknown time?), the family reports that she was off  She has been complaining of nausea  EMS was then called for evaluation    They reported some very slight facial droop, however appreciated no other deficits  She was stable on route  BS in 170s  On arrival, the patient is alert, oriented and able to answer questions  She denies any chest pain, headache, vision changes  She reports she is nauseous  She denies any weakness  She denies any abdominal pain  Remainder of history limited at this time  Imagin22 MRI Brain:  IMPRESSION:     Punctate foci of acute infarction in the right hippocampus and   posterior periventricular white matter      Approximate 2 5 cm right frontal meningioma with associated vasogenic edema vasogenic edema  Follow-up with the neurosurgical service is recommended      Mild to moderate cerebral and pontine chronic microangiopathic disease  22 CT abdomen:  IMPRESSION:     Colonic diverticulosis with minimal inflammatory changes around the sigmoid colon which may represent acute diverticulitis  No drainable fluid collection  Recommend posttreatment colonoscopy to rule out underlying neoplasm      Distended gallbladder with gallstones and sludge  If clinically warranted right upper quadrant sonogram may be obtained for further evaluation    22 CTA H&N:  IMPRESSION:     1  Moderate to severe (60-80%) in the the 4th segment of the left vertebral artery secondary to atherosclerotic plaque  No hemodynamically significant stenosis, dissection or occlusion of the carotid arteries  2   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  Recommend [facility] consultation  No hemodynamically significant stenosis or occlusion of the major vessels of the [place]  3   Right frontal extra-axial enhancing, partially calcified 2 6 cm mass suggesting a meningioma  Small mild vasogenic edema along the posterior aspect  Recommend MRI of the brain with gadolinium  4   No evidence of acute infarct, significant mass effect or intracranial hemorrhage    22 CXR:  IMPRESSION:     No acute cardiopulmonary disease      S: Pt awake, sitting upright in bed with family x2 present upon SLP arrival  Pt is [facility] and family reports pt is functioning at her baseline, though they've noted gradual, increased confusion  O:  Medical record review/interview:  Predisposing dysphagia risk factors: HLD, HTN  Precipitating dysphagia risk factors: mini-stroke    Vitals/labs:  Temp: WNL  SpO2: 95%  RR: 18  WBCs: elevated    Cranial nerve exam:  CN V: intact b/l  CN VII: intact b/l  CN IX/X: intact  CN XII: intact b/l    Laryngeal function exam:  Secretions: WNL  Vocal quality: clear  Cough: reflexive x1    PO trials:  Ice:   Thin: cup & straw sips x10  Nectar:   Honey:   Puree:   Soft solid:   Hard solid: cookies x3    Notes: natural dentition    A: Pt was on baseline regular/thin diet (cardiac) prior to SLP consult  Due to imaging results and slight concerns re: medication administration from nursing (i e  only able to take 1 pill at a time), full evaluation vs  screen completed  Pt presents with suspected baseline swallow function  Reflexive cough x1 noted on first straw sip of coffee; pt reported it was still hot  (Pt prefers use of straws ) SLP encouraged small, single sips, which pt independently demonstrated for the remainder of PO boluses  No oropharyngeal dysphagia nor overt s/sx of aspiration observed  Recommend continuing baseline, regular/thin diet (cardiac)  No further ST indicated at this time      P:  Instrumentation: Not indicated  Diet recommendation: Continue pt's baseline, regular/thin diet  Therapy: Not indicated

## 2022-06-26 NOTE — ASSESSMENT & PLAN NOTE
Appreciate cardiology input    Metoprolol changed to coreg  Will continue to monitor blood pressure  /73

## 2022-06-26 NOTE — CONSULTS
NEUROLOGY RESIDENCY CONSULT NOTE     Name: Christophe Conklin   Age & Sex: 80 y o  female   MRN: [id number]  Unit/Bed#: [facility] 930-01   Encounter: [id number]  Length of Stay: 3    ASSESSMENT & PLAN     Stroke (cerebrum) [facility])  Assessment & Plan  Assessment:  Christophe Conklin is a 80 y o  female  found to have a punctate foci of acute infarction in the right hippocampus and posterior periventricular white matter on MRI  Workup: In the ED, Presenting BP Blood Pressure: (!) 209/110  MRI: Punctate foci of acute infarction in the right hippocampus and posterior periventricular white matter    Risk factors: hypertension, diabetes, hyperlipidemia and coronary artery disease    Impression: The pts stroke is most likely related to Small Vessel/microvascular disease in the setting of a patient with significant small vessel disease risk factors including uncontrolled hypertension, diabetes, hyperlipidemia, and coronary artery disease  Plan:  - Recommend risk factor reduction including blood pressure control, diabetes control and cholesterol control  - ASA 81 mg daily, can consider switching to Plavix  - Atorvastatin 40 mg q d  Lab Results   Component Value Date    HGBA1C 6 0 (H) 06/25/2022     Lab Results   Component Value Date    CHOLESTEROL 261 (H) 06/25/2022    TRIG 169 (H) 06/25/2022    HDL 57 06/25/2022    LDLCALC 170 (H) 06/25/2022         Brain mass  Assessment & Plan  - MRI imaging noted an approximate 2 5 cm right frontal meningioma with associated vasogenic edema vasogenic edema  - recommend follow-up with Neurosurgery, if the family is amenable to surgical intervention      The patient can follow up with Neurology as an outpatient in approximately 6-12 weeks with vascular or general AP or attending  Disposition pending: No additional recommendations at this time      SUBJECTIVE     Reason for Consult / Principal Problem:  Stroke noted on MRI  Hx and PE limited by:  Nausea and the patient starting to dry valerie    HPI: Adelaide Nieves is a 80 y o  female with PMHx of hyperlipidemia, hypothyroidism, uncontrolled hypertension, gastroesophageal reflux disease, CAD with triple-vessel coronary disease, who initially presented to the hospital due to increased shortness of breath and dyspnea on exertion  The patient was noted to have a blood pressure of 223/100  EMS reported that they felt she might have had a facial droop however this was not noted in the ED  An MRI was obtained which showed a punctate foci of acute infarction in the right hippocampus and posterior periventricular white matter  At the time of my evaluation the patient reported that she was nauseous but otherwise did not report any other symptoms  Review of Systems  A 12 point ROS was completed  Other than the above mentioned complaints, all remaining systems were negative  Inpatient consult to Neurology  Consult performed by: Esme Sierra DO  Consult ordered by: Yara Jacob MD        Historical Information   Past Medical History:   Diagnosis Date    Arthritis     Malignant neoplasm of breast [facility])     Resolved:  Oct 26, 2015    Sciatica     last assessed: 2013     Past Surgical History:   Procedure Laterality Date    BREAST BIOPSY      BREAST LUMPECTOMY      CARDIAC SURGERY      CATARACT EXTRACTION      CORONARY ARTERY BYPASS GRAFT  2009    ROTATOR CUFF REPAIR      TONSILLECTOMY      TOTAL ABDOMINAL HYSTERECTOMY       Social History   Social History     Substance and Sexual Activity   Alcohol Use Yes    Comment: social      Social History     Substance and Sexual Activity   Drug Use No     E-Cigarette/Vaping     E-Cigarette/Vaping Substances     Social History     Tobacco Use   Smoking Status Former Smoker    Types: Cigarettes    Quit date:     Years since quittin 5   Smokeless Tobacco Never Used     Family History:   Family History   Problem Relation Age of Onset    Other Mother         Maternal coagulation defect complicating pregnancy/childbirth/puerperium     Emphysema Father     Other Sister         Pacemaker Placement     Coronary artery disease Brother     Melanoma Daughter      Meds/Allergies   all current active meds have been reviewed  Allergies   Allergen Reactions    Meperidine Nausea Only    Tramadol Nausea Only       Review of previous medical records was completed  OBJECTIVE     Patient ID: Cindi Cavazos is a 80 y o  female  Vitals:   Vitals:    22 2148 22 0219 22 0543 22 0700   BP: 160/81 (!) 131/103     Pulse: 72 84  80   Resp: 16 18  18   Temp: 97 6 °F (36 4 °C) 98 3 °F (36 8 °C)     TempSrc:       SpO2: 94% 95%  93%   Weight:   100 kg (220 lb 7 4 oz)    Height:          Body mass index is 39 05 kg/m²  Intake/Output Summary (Last 24 hours) at 2022 0926  Last data filed at 2022 0502  Gross per 24 hour   Intake 140 ml   Output 580 ml   Net -440 ml       Temperature:   Temp (24hrs), Av 5 °F (36 4 °C), Min:97 2 °F (36 2 °C), Max:98 3 °F (36 8 °C)    Temperature: 98 3 °F (36 8 °C)    Invasive Devices: Invasive Devices  Report    Peripheral Intravenous Line  Duration           Peripheral IV 22 Left;Ventral (anterior) Forearm 2 days    Peripheral IV 22 Proximal;Right;Ventral (anterior) Antecubital 2 days          Drain  Duration           External Urinary Catheter <1 day                Physical Exam  Vitals and nursing note reviewed  Constitutional:       General: She is not in acute distress  Appearance: Normal appearance  She is obese  HENT:      Head: Normocephalic and atraumatic  Nose: Nose normal    Eyes:      General: Lids are normal  No scleral icterus  Right eye: No discharge  Left eye: No discharge  Extraocular Movements: Extraocular movements intact  Conjunctiva/sclera: Conjunctivae normal       Pupils: Pupils are equal, round, and reactive to light     Cardiovascular:      Rate and Rhythm: Normal rate  Pulmonary:      Effort: Pulmonary effort is normal    Abdominal:      General: Abdomen is flat  Musculoskeletal:         General: Normal range of motion  Cervical back: Normal range of motion  Skin:     General: Skin is warm and dry  Neurological:      Mental Status: She is alert  Psychiatric:         Mood and Affect: Mood normal          Speech: Speech normal          Behavior: Behavior normal           Neurological Exam  Mental Status  Alert  Recent and remote memory are intact  Speech is normal  Language is fluent with no aphasia  Attention and concentration are normal     Cranial Nerves  CN II: Visual acuity is normal  Visual fields full to confrontation  CN III, IV, VI: Extraocular movements intact bilaterally  Normal lids and orbits bilaterally  Pupils equal round and reactive to light bilaterally  CN V: Facial sensation is normal   CN VII: Full and symmetric facial movement  CN VIII: Hearing is normal   CN IX, X: Palate elevates symmetrically  CN XI: Shoulder shrug strength is normal   CN XII: Tongue midline without atrophy or fasciculations  Motor  Normal muscle bulk throughout  Normal muscle tone  No abnormal involuntary movements  Patient is spontaneously moving all extremities  Sensory  Light touch is normal in upper and lower extremities  Reflexes  Deep tendon reflexes are 2+ and symmetric except as noted  Coordination  Right: Finger-to-nose normal Left: Finger-to-nose normal     Gait    Unable to be assessed due to the patient's current medical status  LABORATORY DATA     Labs: I have personally reviewed pertinent reports      Results from last 7 days   Lab Units 06/27/22  0547 06/26/22  0807 06/25/22  1602   WBC Thousand/uL 12 82* 13 32* 15 82*   HEMOGLOBIN g/dL 12 1 13 2 13 6   HEMATOCRIT % 37 2 39 4 40 5   PLATELETS Thousands/uL 224 252 236   NEUTROS PCT % 72 76* 84*   MONOS PCT % 12 8 5      Results from last 7 days   Lab Units 06/27/22  0547 06/26/22  0807 06/25/22  1333   POTASSIUM mmol/L 3 9 3 9 4 2   CHLORIDE mmol/L 99* 99* 99*   CO2 mmol/L 26 29 25   BUN mg/dL 28* 26* 27*   CREATININE mg/dL 1 45* 1 46* 1 51*   CALCIUM mg/dL 8 8 9 0 9 2   ALK PHOS U/L 126* 135* 147*   ALT U/L 20 20 22   AST U/L 14 15 12     Results from last 7 days   Lab Units 06/25/22  1333   MAGNESIUM mg/dL 2 2     Results from last 7 days   Lab Units 06/25/22  1333   PHOSPHORUS mg/dL 3 6      Results from last 7 days   Lab Units 06/25/22  1602 06/24/22  1721   INR  1 02 1 00   PTT seconds 21* 26     Results from last 7 days   Lab Units 06/24/22  1737   LACTIC ACID mmol/L 1 9           IMAGING & DIAGNOSTIC TESTING     Radiology Results: I have personally reviewed pertinent reports  MRI brain w wo contrast   Final Result by Natalio Sneed MD (06/26 4886)      Punctate foci of acute infarction in the right hippocampus and    posterior periventricular white matter  Approximate 2 5 cm right frontal meningioma with associated vasogenic edema vasogenic edema  Follow-up with the neurosurgical service is recommended  Mild to moderate cerebral and pontine chronic microangiopathic disease  I personally discussed this study with Mely Brambila on 6/26/2022 at 8:58 AM       Workstation performed: [id number]         US right upper quadrant   Final Result by Kojo Gonzalez DO (06/25 2205)   1  Limited exam demonstrating cholelithiasis and biliary sludge without sonographic evidence of acute cholecystitis  Workstation performed: [id number]         CT abdomen pelvis wo contrast   Final Result by Ayanna Robertson DO (06/25 7081)      Colonic diverticulosis with minimal inflammatory changes around the sigmoid colon which may represent acute diverticulitis  No drainable fluid collection  Recommend posttreatment colonoscopy to rule out underlying neoplasm  Distended gallbladder with gallstones and sludge    If clinically warranted right upper quadrant sonogram may be obtained for further evaluation      The study was marked in [facility] for immediate notification  Workstation performed: [id number]         CTA head and neck with and without contrast   Final Result by Terence Haynes MD (06/24 1952)      1  Moderate to severe (60-80%) in the the 4th segment of the left vertebral artery secondary to atherosclerotic plaque  No hemodynamically significant stenosis, dissection or occlusion of the carotid arteries  2   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  Recommend nonemergent neurosciences Center consultation  No hemodynamically significant stenosis or occlusion of the major vessels of the [place]  3   Right frontal extra-axial enhancing, partially calcified 2 6 cm mass suggesting a meningioma  Small mild vasogenic edema along the posterior aspect  Recommend MRI of the brain with gadolinium  4   No evidence of acute infarct, significant mass effect or intracranial hemorrhage  Workstation performed: [id number]         XR chest portable   Final Result by Yoli Szymanski DO (06/25 2592)      No acute cardiopulmonary disease  Workstation performed: [id number]             Other Diagnostic Testing: I have personally reviewed pertinent reports        ACTIVE MEDICATIONS     Current Facility-Administered Medications   Medication Dose Route Frequency    acetaminophen (TYLENOL) tablet 650 mg  650 mg Oral Q6H PRN    allopurinol (ZYLOPRIM) tablet 100 mg  100 mg Oral Daily    aluminum-magnesium hydroxide-simethicone (MYLANTA) oral suspension 30 mL  30 mL Oral Q6H PRN    aspirin chewable tablet 81 mg  81 mg Oral Daily    atorvastatin (LIPITOR) tablet 40 mg  40 mg Oral QPM    busPIRone (BUSPAR) tablet 10 mg  10 mg Oral TID    carvedilol (COREG) tablet 12 5 mg  12 5 mg Oral BID With Meals    cefTRIAXone (ROCEPHIN) 1,000 mg in dextrose 5 % 50 mL IVPB  1,000 mg Intravenous Q24H    cholecalciferol (VITAMIN D3) tablet 1,000 Units 1,000 Units Oral Daily    co-enzyme Q-10 capsule 100 mg  100 mg Oral Daily    cyanocobalamin (VITAMIN B-12) tablet 1,000 mcg  1,000 mcg Oral Daily    docusate sodium (COLACE) capsule 100 mg  100 mg Oral BID PRN    famotidine (PEPCID) tablet 20 mg  20 mg Oral Daily    furosemide (LASIX) tablet 20 mg  20 mg Oral Daily PRN    labetalol (NORMODYNE) injection 10 mg  10 mg Intravenous Q6H PRN    levothyroxine tablet 50 mcg  50 mcg Oral Early Morning    lisinopril (ZESTRIL) tablet 20 mg  20 mg Oral Daily    loratadine (CLARITIN) tablet 10 mg  10 mg Oral Daily    LORazepam (ATIVAN) tablet 0 5 mg  0 5 mg Oral BID    magnesium oxide (MAG-OX) tablet 400 mg  400 mg Oral Daily    metroNIDAZOLE (FLAGYL) IVPB (premix) 500 mg 100 mL  500 mg Intravenous Q8H    multivitamin-minerals (CENTRUM) tablet 1 tablet  1 tablet Oral Daily    ondansetron (ZOFRAN) injection 4 mg  4 mg Intravenous Q6H PRN    pantoprazole (PROTONIX) EC tablet 40 mg  40 mg Oral Daily       Prior to Admission medications    Medication Sig Start Date End Date Taking?  Authorizing Provider   LORazepam (ATIVAN) 0 5 mg tablet Take 0 5 mg by mouth 2 (two) times a day   Yes Historical Provider, MD   allopurinol (ZYLOPRIM) 100 mg tablet Take 1 tablet (100 mg total) by mouth daily 1/73/98   Christie Caban,    aspirin 81 mg chewable tablet Chew 1 tablet daily 8/11/15   Historical Provider, MD   busPIRone (BUSPAR) 10 mg tablet Take 1 tablet (10 mg total) by mouth 3 (three) times a day 4/95/08   Christie Caban DO   cetirizine (ZyrTEC) 10 mg tablet Take 10 mg by mouth daily    Historical Provider, MD   Cholecalciferol (D3-1000) 1000 units tablet Take 1,000 Units by mouth daily    Historical Provider, MD   Coenzyme Q10 (CO Q 10) 100 MG CAPS Take by mouth    Historical Provider, MD   cyanocobalamin (VITAMIN B-12) 1,000 mcg tablet Take by mouth    Historical Provider, MD   famotidine (PEPCID) 20 mg tablet Take 1 tablet (20 mg total) by mouth 2 (two) times a day 6/0/56   Rayma Boykin, DO   furosemide (LASIX) 20 mg tablet TAKE 1 TABLET(20 MG) BY MOUTH DAILY AS NEEDED FOR SWELLING 2/1/24   Rayma Boykin, DO   ketoconazole (NIZORAL) 2 % cream Apply topically daily 1/8/89   Rayma Boykin, DO   levothyroxine 50 mcg tablet Take 1 tablet (50 mcg total) by mouth daily 86/52/49   Rayma Boykin, DO   lisinopril (ZESTRIL) 40 mg tablet Take 0 5 tablets (20 mg total) by mouth daily 5/0/59   Rayma Boykin, DO   Magnesium 100 MG TABS Take by mouth    Historical Provider, MD   meloxicam (MOBIC) 7 5 mg tablet TAKE 1 TABLET(7 5 MG) BY MOUTH DAILY 5/01/15   Rayma Boykin, DO   metoprolol succinate (TOPROL-XL) 200 MG 24 hr tablet Take 0 5 tablets (100 mg total) by mouth 3 (three) times a day 2/9/33   Rayma Boykin, DO   Multiple Vitamins-Minerals (PRESERVISION AREDS) CAPS Take by mouth 3/13/17   Historical Provider, MD   pantoprazole (PROTONIX) 40 mg tablet Take 1 tablet (40 mg total) by mouth daily 4/1/69   Rayma Boykin, DO       CODE STATUS & ADVANCED DIRECTIVES     Code Status: Level 1 - Full Code  Advance Directive and Living Will:      Power of :    POLST:        VTE Pharmacologic Prophylaxis: Per primary team  VTE Mechanical Prophylaxis: sequential compression device    ==  DO [facility] Neurology Residency, PGY-2

## 2022-06-26 NOTE — ASSESSMENT & PLAN NOTE
- MRI imaging noted an approximate 2 5 cm right frontal meningioma with associated vasogenic edema vasogenic edema  - recommend follow-up with Neurosurgery, if the family is amenable to surgical intervention

## 2022-06-26 NOTE — PROGRESS NOTES
General Cardiology   Progress Note -  Team One   Jackelyn Child 80 y o  female MRN: [id number]    Unit/Bed#: [facility] 930-01 Encounter: [id number]    Assessment  1  Accelerated hypertension - possibly provoked in the setting of # 2  -/110 on admission  -at average /76, last recorded 162/77, HR 88    -In the ED received x1 dose of IV hydralazine 10 mg and IV Lasix 40 mg  -Outpatient BP regimen; lisinopril 20 mg daily, and metoprolol succinate 100 mg t i d  -Inpatient BP regimen; lisinopril 20 mg daily, and metoprolol succinate 100 mg t i d  2  Abdominal pain/nausea/vomiting  -GI following - recommending conservative treatments  3  Thrombocytopenia (POA)  -Heme-Onc following   -Platelet count 22 on admission, repeat 236 yesterday and currently 252  3  CAD s/p CABG x 6 (2009)  -No recent ischemic testing for review   -No recent symptoms of chest pain/pressure  -HS troponin levels unremarkable on admission  ECG on admission NSR, with nonspecific ST T-wave abnormalities   -Previously on aspirin 81 mg daily (currently on hold d/t # 3)  -Previously on no statin, but was started on atorvastatin 40 mg daily this admission by the primary team   -Previously on metoprolol succinate 100 mg t i d , now on carvedilol 12 5 mg b i d  4  Chronic diastolic CHF  -Appears compensated  -TTE 2014 - LVEF 55%, no regional wall motion abnormalities, mild concentric hypertrophy, grade 1 DD, mild AI, and mild TR  -Previously on oral furosemide 20 mg daily as needed for lower extremity edema  5  Dyslipidemia  -Lipid profile 6/25/2022 - cholesterol 261, triglycerides 169, HDL 57, and     -Previously on no statin, but was started on atorvastatin 40 mg daily this admission by the primary team   6  Morbid obesity; BMI 38 6  7  CKD stage 3-4   -Baseline creatinine 1 3-1 5   -Creatinine 1 3 on admission, currently 1 5  Plan  -BP overall improved since admission but remains moderately elevated    Can further intensify carvedilol to 25 mg b i d , add amlodipine 5 mg daily,  and continue lisinopril 20 mg daily - may need to caution further up titration of ACEI given her baseline renal disease   -Can likely resume aspirin, now that repeat platelet counts have come back normal   -Statin therapy   -No indication for telemetry monitoring at this time    -Can follow up w/Dr Burgess Burrell on DC  Subjective  Review of Systems   Constitutional: Negative for chills, fever and malaise/fatigue  Eyes: Negative for visual disturbance  Cardiovascular: Negative for chest pain, dyspnea on exertion, leg swelling, orthopnea and palpitations  Respiratory: Negative for cough and shortness of breath  Gastrointestinal: Negative for abdominal pain, nausea and vomiting  Neurological: Negative for dizziness, headaches and light-headedness  Objective:   Physical Exam    Vitals: Blood pressure 162/77, pulse 83, temperature (!) 97 3 °F (36 3 °C), resp  rate 16, height 5' 3" (1 6 m), weight 98 8 kg (217 lb 13 oz), SpO2 93 %  ,     Body mass index is 38 58 kg/m²  ,   Systolic (16UBM), GQT:938 , Min:159 , DNB:795     Diastolic (95ZGG), HGD:39, Min:74, Max:79      Intake/Output Summary (Last 24 hours) at 6/26/2022 0858  Last data filed at 6/26/2022 0134  Gross per 24 hour   Intake 530 ml   Output 464 ml   Net 66 ml     Weight (last 2 days)     Date/Time Weight    06/26/22 0540 98 8 (217 81)    06/24/22 2345 98 9 (218 04)    06/24/22 2123 98 9 (218 04)    06/24/22 1611 98 9 (218)          LABORATORY RESULTS      CBC with diff:   Results from last 7 days   Lab Units 06/25/22  1602 06/24/22  1654   WBC Thousand/uL 15 82* 5 27   HEMOGLOBIN g/dL 13 6 9 9*   HEMATOCRIT % 40 5 30 8*   MCV fL 93 95   PLATELETS Thousands/uL 236 22*   MCH pg 31 3 30 6   MCHC g/dL 33 6 32 1   RDW % 14 3 13 8   MPV fL 10 8 13 7*   NRBC AUTO /100 WBCs 0 0       CMP:  Results from last 7 days   Lab Units 06/25/22  1333 06/24/22  1645   POTASSIUM mmol/L 4 2 4 0   CHLORIDE mmol/L 99* 105   CO2 mmol/L 25 27   BUN mg/dL 27* 24   CREATININE mg/dL 1 51* 1 29   CALCIUM mg/dL 9 2 9 2   AST U/L 12 16   ALT U/L 22 27   ALK PHOS U/L 147* 161*   EGFR ml/min/1 73sq m 29 36       BMP:  Results from last 7 days   Lab Units 06/25/22  1333 06/24/22  1645   POTASSIUM mmol/L 4 2 4 0   CHLORIDE mmol/L 99* 105   CO2 mmol/L 25 27   BUN mg/dL 27* 24   CREATININE mg/dL 1 51* 1 29   CALCIUM mg/dL 9 2 9 2       No results found for: NTBNP     Results from last 7 days   Lab Units 06/25/22  1333   MAGNESIUM mg/dL 2 2       Results from last 7 days   Lab Units 06/25/22  1333   HEMOGLOBIN A1C % 6 0*       Results from last 7 days   Lab Units 06/25/22  1333   TSH 3RD GENERATON uIU/mL 1 700       Results from last 7 days   Lab Units 06/25/22  1602 06/24/22  1721   INR  1 02 1 00       Lipid Profile:   Lab Results   Component Value Date    CHOL 242 06/17/2015    CHOL 261 03/25/2014     Lab Results   Component Value Date    HDL 57 06/25/2022    HDL 51 06/01/2021    HDL 56 06/15/2020     Lab Results   Component Value Date    LDLCALC 170 (H) 06/25/2022    LDLCALC 163 (H) 06/01/2021    LDLCALC 163 (H) 06/15/2020     Lab Results   Component Value Date    TRIG 169 (H) 06/25/2022    TRIG 170 (H) 06/01/2021    TRIG 124 06/15/2020       Cardiac testing:   No results found for this or any previous visit  No results found for this or any previous visit  No results found for this or any previous visit  No valid procedures specified  No results found for this or any previous visit        Meds/Allergies   all current active meds have been reviewed and current meds:   Current Facility-Administered Medications   Medication Dose Route Frequency    acetaminophen (TYLENOL) tablet 650 mg  650 mg Oral Q6H PRN    allopurinol (ZYLOPRIM) tablet 100 mg  100 mg Oral Daily    aluminum-magnesium hydroxide-simethicone (MYLANTA) oral suspension 30 mL  30 mL Oral Q6H PRN    atorvastatin (LIPITOR) tablet 40 mg  40 mg Oral Daily With [employer] busPIRone (BUSPAR) tablet 10 mg  10 mg Oral TID    carvedilol (COREG) tablet 12 5 mg  12 5 mg Oral BID With Meals    cefTRIAXone (ROCEPHIN) 1,000 mg in dextrose 5 % 50 mL IVPB  1,000 mg Intravenous Q24H    cholecalciferol (VITAMIN D3) tablet 1,000 Units  1,000 Units Oral Daily    co-enzyme Q-10 capsule 100 mg  100 mg Oral Daily    cyanocobalamin (VITAMIN B-12) tablet 1,000 mcg  1,000 mcg Oral Daily    docusate sodium (COLACE) capsule 100 mg  100 mg Oral BID PRN    famotidine (PEPCID) tablet 20 mg  20 mg Oral Daily    furosemide (LASIX) tablet 20 mg  20 mg Oral Daily PRN    labetalol (NORMODYNE) injection 10 mg  10 mg Intravenous Q6H PRN    levothyroxine tablet 50 mcg  50 mcg Oral Early Morning    lisinopril (ZESTRIL) tablet 20 mg  20 mg Oral Daily    loratadine (CLARITIN) tablet 10 mg  10 mg Oral Daily    LORazepam (ATIVAN) tablet 0 5 mg  0 5 mg Oral BID    magnesium oxide (MAG-OX) tablet 400 mg  400 mg Oral Daily    metroNIDAZOLE (FLAGYL) IVPB (premix) 500 mg 100 mL  500 mg Intravenous Q8H    multivitamin-minerals (CENTRUM) tablet 1 tablet  1 tablet Oral Daily    ondansetron (ZOFRAN) injection 4 mg  4 mg Intravenous Q6H PRN    pantoprazole (PROTONIX) EC tablet 40 mg  40 mg Oral Daily          EKG personally reviewed by RAO Valderrama    Assessment:  Principal Problem:    Uncontrolled hypertension  Active Problems:    Hyperlipidemia    Hyperuricemia    Mild vitamin D deficiency    Primary hypothyroidism    Brain mass    Thrombocytopenia (HCC)    Dizziness    Counseling / Coordination of Care  Total floor / unit time spent today 20 minutes  Greater than 50% of total time was spent with the patient and / or family counseling and / or coordination of care  ** Please Note: Dragon 360 Dictation voice to text software may have been used in the creation of this document   **

## 2022-06-27 NOTE — PHYSICAL THERAPY NOTE
Physical Therapy Evaluation     Patient's Name: Layman Newer    Admitting Diagnosis  Shortness of breath [R06 02]  SOB (shortness of breath) [R06 02]  Thrombocytopenia (HCC) [D69 6]  Uncontrolled hypertension [I10]  Ambulatory dysfunction [R26 2]    Problem List  Patient Active Problem List   Diagnosis    Uncontrolled hypertension    3-vessel coronary artery disease    Depression    Esophageal reflux    Gait disturbance    Generalized anxiety disorder    Generalized osteoarthritis    Hyperlipidemia    Hyperuricemia    Insomnia    Macular degeneration    Mild vitamin D deficiency    Mixed stress and urge urinary incontinence    Obesity    Other chronic pain    PAD (peripheral artery disease) (HCC)    Peripheral neuropathy    Primary hypothyroidism    Primary localized osteoarthritis of both knees    Spinal stenosis    History of right breast cancer    Encounter for follow-up examination after completed treatment for malignant neoplasm    Visual hallucination    Brain mass    Thrombocytopenia (HCC)    Dizziness    Stroke (cerebrum) [facility])       Past Medical History  Past Medical History:   Diagnosis Date    Arthritis     Malignant neoplasm of breast ([facility] [address] )     Resolved: Oct 26, 2015    Sciatica     last assessed: July 2, 2013       Past Surgical History  Past Surgical History:   Procedure Laterality Date    BREAST BIOPSY      BREAST LUMPECTOMY      CARDIAC SURGERY      CATARACT EXTRACTION      CORONARY ARTERY BYPASS GRAFT  2009    ROTATOR CUFF REPAIR      TONSILLECTOMY      TOTAL ABDOMINAL HYSTERECTOMY          06/27/22 0945   PT Last Visit   PT Visit Date 06/27/22   Note Type   Note type Evaluation   Pain Assessment   Pain Assessment Tool 0-10   Pain Score No Pain   Restrictions/Precautions   Weight Bearing Precautions Per Order No   Other Precautions Cognitive; Chair Alarm; Bed Alarm;Multiple lines; Fall Risk;Hard of hearing   Home Living   Type of [address] Two level;Performs ADLs on one level  (3 ARABELLA; FFSU per CM note)   Home Equipment Walker;Cane;Wheelchair-manual  (pt using RW PTA per chart review)   Additional Comments Pt poor historian, informaiton obtained from chart review  Prior Function   Level of [place] Needs assistance with ADLs and functional mobility; Needs assistance with IADLs   Lives With Daughter  (+ WILLIAM)   Receives Help From Family   ADL Assistance Needs assistance   IADLs Needs assistance   Comments Pt poor historian, informaiton obtained from chart review  Cognition   Overall Cognitive Status Impaired   Orientation Level Oriented to person;Oriented to place  (states yr is 2022)   Memory Decreased recall of precautions;Decreased recall of recent events   Following Commands Follows one step commands with increased time or repetition   Comments pt pleasantly confused and cooperative, very [place]   RLE Assessment   RLE Assessment   (functionally 3/5)   LLE Assessment   LLE Assessment   (functionally 3/5)   Bed Mobility   Supine to Sit 3  Moderate assistance   Additional items Assist x 1; Increased time required;Verbal cues;LE management   Sit to Supine Unable to assess   Additional Comments pt supine in bed upon arrival  Pt sitting OOB in bedside chiar with alarm on and all needs wihtin reach   Transfers   Sit to Stand 3  Moderate assistance   Additional items Assist x 2; Increased time required;Verbal cues   Stand to Sit 3  Moderate assistance   Additional items Assist x 2; Increased time required;Verbal cues   Additional Comments transfers with RW initially, progresses to b/l HHA   Ambulation/Elevation   Gait pattern Excessively slow; Short stride; Foward flexed;Decreased foot clearance; Improper Weight shift   Gait Assistance 3  Moderate assist   Additional items Assist x 2;Verbal cues; Tactile cues   Assistive Device   (b/l HHA)   Distance steps to chair ~3ft   Balance   Static Sitting Fair   Dynamic Sitting Fair -   Static Standing Poor +   Dynamic Standing Poor   Ambulatory Poor Endurance Deficit   Endurance Deficit Yes   Activity Tolerance   Activity Tolerance Patient limited by fatigue   Medical Staff Made Aware OT: co-eval performed this date 2* increased medical complexity and multiple co-morbidities   Nurse Made Aware RN cleared pt to participate in therapy session   Assessment   Prognosis Good   Problem List Decreased strength;Decreased endurance;Decreased mobility; Impaired balance;Decreased coordination; Impaired judgement;Decreased safety awareness   Assessment Pt seen for high complexity PT evaluation  Pt with active PT eval/treat and up and OOB as tolerated orders  Pt is a 80 y o  female who was [facility] to [facility] on 6/24/2022 with uncontrolled hypertension  Pt's current dx/ problem list include: hyperlipidemia, hyperuricemia, brain mass, dizziness, and stroke  Comorbidities affecting pt's physical performance at time of assessment are as follows:  has a past medical history of Arthritis, Malignant neoplasm of breast ([address] ), and Sciatica  Personal factors affecting pt at time of initial examination include: steps to enter environment, limited home support, advanced age, past experience, inability to perform IADLs, inability to perform ADLs, inability to ambulate household distances, limited insight into impairments  Due to acute medical issues, ongoing medical workup for primary dx; pain, fall risk, increased reliance on more restrictive AD compared to baseline;  decreased activity tolerance compared to baseline, increased assistance needed from caregiver at current time, multiple lines, decline in overall functional mobility status; health management issues; note unstable clinical picture (high complexity)  At baseline, pt resides with daughter and WILLIAM in 2  with 3 [address]A and was requiring A for ADLs/ IADLs prior to hospital admission   Currently, upon initial examination, pt  is requiring mod A for bed mobility skills; mod Ax2 for functional transfers and mod Ax2 for ambulation with b/l HHA  Currently, pt presents functioning below baseline and with overall mobility deficits 2* to: decreased LE strength/AROM; limited flexibility;  generalized weakness/ deconditioning; decreased endurance; decreased activity tolerance; decreased coordination; impaired balance; gait deviations; decreased safety awareness; fatigue; impaired safety and judgement; limited insight into current deficits; bed/ chair alarms; multiple lines; hearing impairment/ visual impairments; as well as: weakness, decreased ROM, impaired balance, decreased endurance, impaired coordination, gait deviations, decreased activity tolerance, decreased functional mobility tolerance, impaired judgement, fall risk and decreased cognition  Pt currently at increased risk for falls  At the end of evaluation, pt was left sitting OOB in bedside chair with alarm on with all needs in reach  Pt would benefit from continued skilled PT services while in hospital to address remaining limitations and maximize functional potential  PT to continue to follow pt and recommends rehab upon d/c  The patient's AM-PAC Basic Mobility Inpatient Short Form Raw Score is 9  A Raw score of less than or equal to 16 suggests the patient may benefit from discharge to post-acute rehabilitation services  Please also refer to the recommendation of the Physical Therapist for safe discharge planning  Goals   Patient Goals none stated   STG Expiration Date 07/11/22   Short Term Goal #1 STG 1  Pt will be able to perform bed mobility tasks with S level in order to improve overall functional mobility and assist in safe d/c  STG 2  Pt with sit EOB for at least 25 minutes at S level in order to strengthen abdominal musculature and assist in future transfers/ ambulation  STG 3  Pt will be able to perform functional transfer with S level in order to improve overall functional mobility and assist in safe d/c  STG 4   Pt will be able to ambulate at least 50 feet with least restrictive device with S level A in order to improve overall functional mobility and assist in safe d/c  STG 5  Pt will improve sitting/standing static/dynamic balance 1/2 grade in order to improve functional mobility and assist in safe d/c  STG 6  Pt will improve LE strength by 1/2 grade in order to improve functional mobility and assist in safe d/c  STG 7  Pt will be able to negotiate at least 3 stairs with least restrictive device with S level A in order to improve overall functional mobility and assist in safe d/c    PT Treatment Day 0   Plan   Treatment/Interventions ADL retraining;Functional transfer training;LE strengthening/ROM; Cognitive reorientation;Patient/family training;Equipment eval/education; Bed mobility;Gait training;Spoke to nursing;Spoke to case management;OT   PT Frequency 3-5x/wk   Recommendation   PT Discharge Recommendation Post acute rehabilitation services   AM-PAC Basic Mobility Inpatient   Turning in Bed Without Bedrails 3   Lying on Back to Sitting on Edge of Flat Bed 2   Moving Bed to Chair 1   Standing Up From Chair 1   Walk in Room 1   Climb 3-5 Stairs 1   Basic Mobility Inpatient Raw Score 9   Highest Level Of Mobility   JH-HLM Goal 3: Sit at edge of bed   JH-HLM Achieved 4: Move to chair/commode   End of Consult   Patient Position at End of Consult Bedside chair;Bed/Chair alarm activated; All needs within reach           Julianna Corner, PT

## 2022-06-27 NOTE — PLAN OF CARE
Problem: MOBILITY - ADULT  Goal: Maintain or return to baseline ADL function  Description: INTERVENTIONS:  -  Assess patient's ability to carry out ADLs; assess patient's baseline for ADL function and identify physical deficits which impact ability to perform ADLs (bathing, care of mouth/teeth, toileting, grooming, dressing, etc )  - Assess/evaluate cause of self-care deficits   - Assess range of motion  - Assess patient's mobility; develop plan if impaired  - Assess patient's need for assistive devices and provide as appropriate  - Encourage maximum independence but intervene and supervise when necessary  - Involve family in performance of ADLs  - Assess for home care needs following discharge   - Consider OT consult to assist with ADL evaluation and planning for discharge  - Provide patient education as appropriate  Outcome: Progressing  Goal: Maintains/Returns to pre admission functional level  Description: INTERVENTIONS:  - Perform BMAT or MOVE assessment daily    - Set and communicate daily mobility goal to care team and patient/family/caregiver  - Collaborate with rehabilitation services on mobility goals if consulted  - Perform Range of Motion  times a day  - Reposition patient every  hours    - Dangle patient  times a day  - Stand patient  times a day  - Ambulate patient  times a day  - Out of bed to chair  times a day   - Out of bed for meals  times a day  - Out of bed for toileting  - Record patient progress and toleration of activity level   Outcome: Progressing     Problem: Potential for Falls  Goal: Patient will remain free of falls  Description: INTERVENTIONS:  - Educate patient/family on patient safety including physical limitations  - Instruct patient to call for assistance with activity   - Consult OT/PT to assist with strengthening/mobility   - Keep Call bell within reach  - Keep bed low and locked with side rails adjusted as appropriate  - Keep care items and personal belongings within reach  - Initiate and maintain comfort rounds  - Make Fall Risk Sign visible to staff  - Offer Toileting every  Hours, in advance of need  - Initiate/Maintain alarm  - Obtain necessary fall risk management equipment:   - Apply yellow socks and bracelet for high fall risk patients  - Consider moving patient to room near nurses station  Outcome: Progressing     Problem: PAIN - ADULT  Goal: Verbalizes/displays adequate comfort level or baseline comfort level  Description: Interventions:  - Encourage patient to monitor pain and request assistance  - Assess pain using appropriate pain scale  - Administer analgesics based on type and severity of pain and evaluate response  - Implement non-pharmacological measures as appropriate and evaluate response  - Consider cultural and social influences on pain and pain management  - Notify physician/advanced practitioner if interventions unsuccessful or patient reports new pain  Outcome: Progressing     Problem: INFECTION - ADULT  Goal: Absence or prevention of progression during hospitalization  Description: INTERVENTIONS:  - Assess and monitor for signs and symptoms of infection  - Monitor lab/diagnostic results  - Monitor all insertion sites, i e  indwelling lines, tubes, and drains  - Monitor endotracheal if appropriate and nasal secretions for changes in amount and color  - [place] appropriate cooling/warming therapies per order  - Administer medications as ordered  - Instruct and encourage patient and family to use good hand hygiene technique  - Identify and instruct in appropriate isolation precautions for identified infection/condition  Outcome: Progressing  Goal: Absence of fever/infection during neutropenic period  Description: INTERVENTIONS:  - Monitor WBC    Outcome: Progressing     Problem: SAFETY ADULT  Goal: Maintain or return to baseline ADL function  Description: INTERVENTIONS:  -  Assess patient's ability to carry out ADLs; assess patient's baseline for ADL function and identify physical deficits which impact ability to perform ADLs (bathing, care of mouth/teeth, toileting, grooming, dressing, etc )  - Assess/evaluate cause of self-care deficits   - Assess range of motion  - Assess patient's mobility; develop plan if impaired  - Assess patient's need for assistive devices and provide as appropriate  - Encourage maximum independence but intervene and supervise when necessary  - Involve family in performance of ADLs  - Assess for home care needs following discharge   - Consider OT consult to assist with ADL evaluation and planning for discharge  - Provide patient education as appropriate  Outcome: Progressing  Goal: Maintains/Returns to pre admission functional level  Description: INTERVENTIONS:  - Perform BMAT or MOVE assessment daily    - Set and communicate daily mobility goal to care team and patient/family/caregiver  - Collaborate with rehabilitation services on mobility goals if consulted  - Perform Range of Motion  times a day  - Reposition patient every  hours    - Dangle patient  times a day  - Stand patient  times a day  - Ambulate patient  times a day  - Out of bed to chair  times a day   - Out of bed for meals  times a day  - Out of bed for toileting  - Record patient progress and toleration of activity level   Outcome: Progressing  Goal: Patient will remain free of falls  Description: INTERVENTIONS:  - Educate patient/family on patient safety including physical limitations  - Instruct patient to call for assistance with activity   - Consult OT/PT to assist with strengthening/mobility   - Keep Call bell within reach  - Keep bed low and locked with side rails adjusted as appropriate  - Keep care items and personal belongings within reach  - Initiate and maintain comfort rounds  - Make Fall Risk Sign visible to staff  - Offer Toileting every  Hours, in advance of need  - Initiate/Maintain alarm  - Obtain necessary fall risk management equipment:  - Apply yellow socks and bracelet for high fall risk patients  - Consider moving patient to room near nurses station  Outcome: Progressing     Problem: DISCHARGE PLANNING  Goal: Discharge to home or other facility with appropriate resources  Description: INTERVENTIONS:  - Identify barriers to discharge w/patient and caregiver  - Arrange for needed discharge resources and transportation as appropriate  - Identify discharge learning needs (meds, wound care, etc )  - Arrange for interpretive services to assist at discharge as needed  - Refer to Case Management Department for coordinating discharge planning if the patient needs post-hospital services based on physician/advanced practitioner order or complex needs related to functional status, cognitive ability, or social support system  Outcome: Progressing     Problem: Knowledge Deficit  Goal: Patient/family/caregiver demonstrates understanding of disease process, treatment plan, medications, and discharge instructions  Description: Complete learning assessment and assess knowledge base    Interventions:  - Provide teaching at level of understanding  - Provide teaching via preferred learning methods  Outcome: Progressing     Problem: Prexisting or High Potential for Compromised Skin Integrity  Goal: Skin integrity is maintained or improved  Description: INTERVENTIONS:  - Identify patients at risk for skin breakdown  - Assess and monitor skin integrity  - Assess and monitor nutrition and hydration status  - Monitor labs   - Assess for incontinence   - Turn and reposition patient  - Assist with mobility/ambulation  - Relieve pressure over bony prominences  - Avoid friction and shearing  - Provide appropriate hygiene as needed including keeping skin clean and dry  - Evaluate need for skin moisturizer/barrier cream  - Collaborate with interdisciplinary team   - Patient/family teaching  - Consider wound care consult   Outcome: Progressing     Problem: Nutrition/Hydration-ADULT  Goal: Nutrient/Hydration intake appropriate for improving, restoring or maintaining nutritional needs  Description: Monitor and assess patient's nutrition/hydration status for malnutrition  Collaborate with interdisciplinary team and initiate plan and interventions as ordered  Monitor patient's weight and dietary intake as ordered or per policy  Utilize nutrition screening tool and intervene as necessary  Determine patient's food preferences and provide high-protein, high-caloric foods as appropriate       INTERVENTIONS:  - Monitor oral intake, urinary output, labs, and treatment plans  - Assess nutrition and hydration status and recommend course of action  - Evaluate amount of meals eaten  - Assist patient with eating if necessary   - Allow adequate time for meals  - Recommend/ encourage appropriate diets, oral nutritional supplements, and vitamin/mineral supplements  - Order, calculate, and assess calorie counts as needed  - Recommend, monitor, and adjust tube feedings and TPN/PPN based on assessed needs  - Assess need for intravenous fluids  - Provide specific nutrition/hydration education as appropriate  - Include patient/family/caregiver in decisions related to nutrition  Outcome: Progressing

## 2022-06-27 NOTE — OCCUPATIONAL THERAPY NOTE
Occupational Therapy Evaluation     Patient Name: Robert HELTON Date: 6/27/2022  Problem List  Principal Problem:    Uncontrolled hypertension  Active Problems:    Hyperlipidemia    Hyperuricemia    Mild vitamin D deficiency    Primary hypothyroidism    Brain mass    Thrombocytopenia (HCC)    Dizziness    Stroke (cerebrum) [facility])    Past Medical History  Past Medical History:   Diagnosis Date    Arthritis     Malignant neoplasm of breast ([address] )     Resolved: Oct 26, 2015    Sciatica     last assessed: July 2, 2013     Past Surgical History  Past Surgical History:   Procedure Laterality Date    BREAST BIOPSY      BREAST LUMPECTOMY      CARDIAC SURGERY      CATARACT EXTRACTION      CORONARY ARTERY BYPASS GRAFT  2009    ROTATOR CUFF REPAIR      TONSILLECTOMY      TOTAL ABDOMINAL HYSTERECTOMY           06/27/22 0946   OT Last Visit   OT Visit Date 06/27/22   Note Type   Note type Evaluation   Restrictions/Precautions   Weight Bearing Precautions Per Order No   Other Precautions Cognitive; Chair Alarm; Bed Alarm;Multiple lines; Fall Risk;Hard of hearing  (pt w/ B/L hearing aids, [place] with them in)   Pain Assessment   Pain Assessment Tool 0-10   Pain Score No Pain   Home Living   Type of [address] Two level;Performs ADLs on one level  (3STE, FFSU)   Home Equipment Walker;Cane;Wheelchair-manual  (used RW PTA)   Additional Comments Pt is a poor historian  Information obtained from chart   Prior Function   Level of [place] Needs assistance with IADLs; Needs assistance with ADLs and functional mobility   Lives With Daughter  (+ WILLIAM)   Receives Help From Family   ADL Assistance Needs assistance   IADLs Needs assistance   Comments Pt is a poor historian   Information obtained from chart   Lifestyle   Autonomy Per chart, pta, pt requires A with ADLs/IADLs, RW for fnxl mobility   Reciprocal Relationships Dtr, WILLIAM   Service to Others Retired   Intrinsic Gratification Bingo, knitting   Psychosocial Psychosocial (WDL) WDL   ADL   Where Assessed Edge of bed   Eating Assistance 5  Supervision/Setup   Grooming Assistance 4  Minimal Assistance   UB Bathing Assistance 4  Minimal Assistance   LB Bathing Assistance 3  Moderate Assistance   UB Dressing Assistance 4  Minimal Assistance   LB Dressing Assistance 3  Moderate [address]  3  Moderate Assistance   Bed Mobility   Supine to Sit 3  Moderate assistance   Additional items Assist x 1; Increased time required;LE management   Sit to Supine Unable to assess   Transfers   Sit to Stand 3  Moderate assistance   Additional items Assist x 2; Increased time required   Stand to Sit 3  Moderate assistance   Additional items Assist x 2; Increased time required;Verbal cues   Additional Comments Initial STS with RW, transitioned to B/L HHA   Functional Mobility   Functional Mobility 3  Moderate assistance   Additional Comments Ax2 bed to chair   Additional items Hand hold assistance   Balance   Static Sitting Fair   Dynamic Sitting Fair   Static Standing Poor +   Dynamic Standing Poor   Ambulatory Poor   Activity Tolerance   Activity Tolerance Patient limited by fatigue   Medical Staff Made Aware PT M[place]   Nurse Made Aware RN clearance for session   RUE Assessment   RUE Assessment   (decreased external rotation)   LUE Assessment   LUE Assessment WFL   Vision-Basic Assessment   Current Vision Wears glasses all the time   Cognition   Overall Cognitive Status Impaired   Arousal/Participation Cooperative   Attention Attends with cues to redirect   Orientation Level Oriented to person;Oriented to place  (oriented to year)   Memory Decreased recall of recent events;Decreased short term memory   Following Commands Follows one step commands with increased time or repetition   Comments Pt pleasant and cooperative t/o session, very [place]  Requires increased time for all tasks   Assessment   Limitation Decreased ADL status; Decreased UE strength;Decreased UE ROM;Decreased Safe judgement during ADL;Decreased cognition;Decreased endurance;Decreased self-care trans;Decreased high-level ADLs   Prognosis Fair   Assessment Pt is a 80 y o  female [facility] [place] [facility] w/ Uncontrolled hypertension, brain mass, cerebrum stroke   has a past medical history of Arthritis, Malignant neoplasm of breast,  CAD, macular degeneration, PAD, spinal stenosis, generalized anxiety disorder, OA, and Sciatica  Pt with active OT orders and up and OOB as tolerated orders  Pt seen as a co-evaluation with PT due to the patient's co-morbidities, clinically unstable presentation/clinical complexity, and present impairments  Pt is a poor historian  Per chart, pta, pt resides with her dtr and WILLIAM in a 2STH, FFSU, 3STE  Pt required A w/  ADLS and IADLS  Upon evaluation, pt currently requires MOD A x 2 for transfers and mobility  Pt currently requires S eating, MIN A grooming, MIN A UB ADLs, MOD A LB ADLs, and MOD A toileting  Pt is limited at this time 2*: endurance, activity tolerance, functional mobility, balance, functional standing tolerance, decreased I w/ ADLS/IADLS, strength, ROM, cognitive impairments, decreased safety awareness and decreased insight into deficits  The following Occupational Performance Areas to address include: eating, grooming, bathing/shower, toilet hygiene, dressing, health maintenance, functional mobility, community mobility and clothing management  Pt to benefit from immediate acute skilled OT to address above deficits, improve overall functional independence, maximize fnxl mobility and reduce caregiver burden  From OT standpoint, recommendation at time of d/c would be STR  Pt was left in chair with alarm on after session with all current needs met  The patient's raw score on the AM-PAC Daily Activity inpatient short form is 15, standardized score is 34 69, less than 39 4   Patients at this level are likely to benefit from discharge to post-acute rehabilitation services  Please refer to the recommendation of the Occupational Therapist for safe discharge planning  Goals   LTG Time Frame 10-14   Plan   Treatment Interventions ADL retraining;Functional transfer training;UE strengthening/ROM; Endurance training;Cognitive reorientation;Patient/family training;Equipment evaluation/education; Compensatory technique education;Continued evaluation; Energy conservation; Activityengagement   Goal Expiration Date 07/11/22   OT Frequency 3-5x/wk   Recommendation   OT Discharge Recommendation Post acute rehabilitation services   OT - OK to Discharge Yes  (to rehab when medically stable)   AM-PAC Daily Activity Inpatient   Lower Body Dressing 2   Bathing 2   Toileting 2   Upper Body Dressing 3   Grooming 3   Eating 3   Daily Activity Raw Score 15   Daily Activity Standardized Score (Calc for Raw Score >=11) 34 69   AM-PAC Applied Cognition Inpatient   Following a Speech/Presentation 3   Understanding Ordinary Conversation 3   Taking Medications 3   Remembering Where Things Are Placed or Put Away 2   Remembering List of 4-5 Errands 2   Taking Care of Complicated Tasks 2   Applied Cognition Raw Score 15   Applied Cognition Standardized Score 33 54       GOALS    - Pt will complete UB dressing/self care/bathing w/ S using adaptive device and DME as needed    - Pt will complete LB dressing/self care/bathing w/ S using adaptive device and DME as needed    - Pt will complete toileting w/ S w/ G hygiene/thoroughness using DME as needed    - Pt will improve functional transfers to MOD I on/off all surfaces using DME as needed w/ G balance/safety     - Pt will improve functional mobility during ADL/IADL/leisure tasks to MOD I using DME as needed w/ G balance/safety     - Pt will demonstrate G carryover of pt/caregiver education and training as appropriate      - Pt will demonstrate 100% carryover of energy conservation techniques t/o functional I/ADL/leisure tasks w/o cues s/p skilled education    - Pt will engage in ongoing cognitive assessment w/ G participation to assist w/ safe d/c planning/recommendations    - Pt will increase static and dynamic standing/sitting balance to F using AD/DME as needed to increase safety and I during fnxl transfers and ADLs    - Pt will maintain upright sitting position for at least 20 min during dynamic fnxl activity with F balance and endurance to improve ADL performance and independence, as well as reduce risk of falls     Brigitte Colin MS, OTR/L

## 2022-06-27 NOTE — PROGRESS NOTES
Cardiology Progress Note - Team 1  Lizzie Schofield 80 y o  female MRN: [id number]  [facility] Encounter: [id number]      Assessment/Plan:  1  Accelerated hypertension  - possibly provoked in the setting of #3  - /110 on admission - last documented at 147/74; improved, but remains elevated  - outpatient BP regimen - lisinopril 20 mg daily, and metoprolol succinate 100 mg t i d   - inpatient BP regimen - lisinopril 20 mg p o  daily, carvedilol 12 5 mg p o  B i d   - telemetry review - NSR, HR 70s to 80s, inappropriate bradycardic reading secondary to artifact  - okay for normotension per Neurology - will up titrate carvedilol to 25 mg p o  B i d , consider the addition of amlodipine if blood pressures remain elevated  - would hold off on up titrating lisinopril given kidney function    2  Acute stroke  - MRI brain - punctate foci of acute infarction in the right hippocampus and posterior periventricular white matter, 2 5 cm right frontal meningioma with associated vasogenic edema   - continue aspirin 81 mg daily p o , atorvastatin 40 mg p o  daily  - neurology following     3  Abdominal pain/nausea/vomiting  - GI following - recommending conservative treatments    4   Thrombocytopenia   - POA - platelet count 22 on admission > 224 this morning  - heme/Onc following    5  CAD s/p CABG x 6 (2009)  - no recent ischemic testing for review  - subjectively denies any anginal symptoms  - HS troponin unremarkable  - presenting EKG on admission NSR, with nonspecific ST T-wave abnormalities  - continue carvedilol, aspirin, atorvastatin    6  Chronic diastolic CHF  - appears compensated  - echo yesterday - LVEF 65%, RWM cannot be excluded on the basis of the study, grade 1 DD, mildly dilated LA/RA, mild TR; grossly unchanged from previous study in 2014   - previously on oral furosemide 20 mg daily as needed for lower extremity edema    7  Dyslipidemia  - lipid profile (6/25/2022) - cholesterol 261/ triglycerides 169/ HDL 57/     - not maintained on statin outpatient  - continue statin inpatient     8  Morbid obesity  - BMI 38 6    9  CKD stage 3-4  - creatinine 1 3 on admission > 1 45 this morning  - baseline creatinine 1 3-1 5    Subjective:   Patient seen and examined  No significant events overnight  Patient states she feels "worried" this morning, but is overall feeling better  She denies any CP or SOB  Objective:   Vitals: Blood pressure (!) 131/103, pulse 80, temperature 98 3 °F (36 8 °C), resp  rate 18, height 5' 3" (1 6 m), weight 100 kg (220 lb 7 4 oz), SpO2 93 %  , Body mass index is 39 05 kg/m² ,   Orthostatic Blood Pressures    Flowsheet Row Most Recent Value   Blood Pressure 131/103 filed at 06/27/2022 0219   Patient Position - Orthostatic VS Sitting filed at 06/24/2022 2345          Intake/Output Summary (Last 24 hours) at 6/27/2022 0925  Last data filed at 6/27/2022 0502  Gross per 24 hour   Intake 140 ml   Output 580 ml   Net -440 ml     Physical Exam  Constitutional:       General: She is not in acute distress  Appearance: She is obese  She is not ill-appearing  HENT:      Head: Normocephalic and atraumatic  Nose: Nose normal       Mouth/Throat:      Mouth: Mucous membranes are moist       Pharynx: Oropharynx is clear  Eyes:      Pupils: Pupils are equal, round, and reactive to light  Cardiovascular:      Rate and Rhythm: Normal rate and regular rhythm  Pulses: Normal pulses  Heart sounds: Normal heart sounds  No murmur heard  No friction rub  No gallop  Pulmonary:      Effort: Pulmonary effort is normal       Breath sounds: Normal breath sounds  No wheezing, rhonchi or rales  Abdominal:      General: Bowel sounds are normal  There is no distension  Palpations: Abdomen is soft  Tenderness: There is no abdominal tenderness  Musculoskeletal:         General: Normal range of motion  Cervical back: Normal range of motion  Right lower leg: No edema  Left lower leg: No edema  Skin:     General: Skin is warm and dry  Capillary Refill: Capillary refill takes less than 2 seconds  Neurological:      General: No focal deficit present  Mental Status: She is alert     Psychiatric:         Mood and Affect: Mood normal          Behavior: Behavior normal       Medications:    Current Facility-Administered Medications:     acetaminophen (TYLENOL) tablet 650 mg, 650 mg, Oral, Q6H PRN, James Salazar MD    allopurinol (ZYLOPRIM) tablet 100 mg, 100 mg, Oral, Daily, James Salazar MD, 100 mg at 06/27/22 0908    aluminum-magnesium hydroxide-simethicone (MYLANTA) oral suspension 30 mL, 30 mL, Oral, Q6H PRN, James Salazar MD    aspirin chewable tablet 81 mg, 81 mg, Oral, Daily, Jenaro Braga MD, 81 mg at 06/27/22 0907    atorvastatin (LIPITOR) tablet 40 mg, 40 mg, Oral, QPM, Jenaro Braga MD, 40 mg at 06/26/22 1712    busPIRone (BUSPAR) tablet 10 mg, 10 mg, Oral, TID, James Salazar MD, 10 mg at 06/27/22 0908    carvedilol (COREG) tablet 12 5 mg, 12 5 mg, Oral, BID With Meals, RAO Logan, 12 5 mg at 06/27/22 0909    cefTRIAXone (ROCEPHIN) 1,000 mg in dextrose 5 % 50 mL IVPB, 1,000 mg, Intravenous, Q24H, Jenaro Braga MD, Last Rate: 100 mL/hr at 06/27/22 0907, 1,000 mg at 06/27/22 2207    cholecalciferol (VITAMIN D3) tablet 1,000 Units, 1,000 Units, Oral, Daily, James Salazar MD, 1,000 Units at 06/27/22 0908    co-enzyme Q-10 capsule 100 mg, 100 mg, Oral, Daily, James Salazar MD, 100 mg at 06/27/22 0908    cyanocobalamin (VITAMIN B-12) tablet 1,000 mcg, 1,000 mcg, Oral, Daily, James Salazar MD, 1,000 mcg at 06/27/22 0908    docusate sodium (COLACE) capsule 100 mg, 100 mg, Oral, BID PRN, James Salazar MD    famotidine (PEPCID) tablet 20 mg, 20 mg, Oral, Daily, James Salazar MD, 20 mg at 06/27/22 0908    furosemide (LASIX) tablet 20 mg, 20 mg, Oral, Daily PRN, James Salazar MD    labetalol (NORMODYNE) injection 10 mg, 10 mg, Intravenous, Q6H PRN, RAO Sawyer    levothyroxine tablet 50 mcg, 50 mcg, Oral, Early Morning, Shey Figueroa MD, 50 mcg at 06/27/22 0501    lisinopril (ZESTRIL) tablet 20 mg, 20 mg, Oral, Daily, Shey Figueroa MD, 20 mg at 06/27/22 0908    loratadine (CLARITIN) tablet 10 mg, 10 mg, Oral, Daily, Shey Figueroa MD, 10 mg at 06/27/22 0908    LORazepam (ATIVAN) tablet 0 5 mg, 0 5 mg, Oral, BID, Shey Figueroa MD, 0 5 mg at 06/27/22 0908    magnesium oxide (MAG-OX) tablet 400 mg, 400 mg, Oral, Daily, Shey Figueroa MD, 400 mg at 06/27/22 0908    metroNIDAZOLE (FLAGYL) IVPB (premix) 500 mg 100 mL, 500 mg, Intravenous, Q8H, Joselyn Munguia MD, Stopped at 06/27/22 0020    multivitamin-minerals (CENTRUM) tablet 1 tablet, 1 tablet, Oral, Daily, Shey Figueroa MD, 1 tablet at 06/27/22 0908    ondansetron (ZOFRAN) injection 4 mg, 4 mg, Intravenous, Q6H PRN, Shey Figueroa MD, 4 mg at 06/26/22 1336    pantoprazole (PROTONIX) EC tablet 40 mg, 40 mg, Oral, Daily, Shey Figueroa MD, 40 mg at 06/27/22 0908     Labs & Results:      Results from last 7 days   Lab Units 06/27/22  0547 06/26/22  0807 06/25/22  1602   WBC Thousand/uL 12 82* 13 32* 15 82*   HEMOGLOBIN g/dL 12 1 13 2 13 6   HEMATOCRIT % 37 2 39 4 40 5   PLATELETS Thousands/uL 224 252 236     Results from last 7 days   Lab Units 06/27/22  0547 06/25/22  1333   TRIGLYCERIDES mg/dL 119 169*   HDL mg/dL 58 57     Results from last 7 days   Lab Units 06/27/22  0547 06/26/22  0807 06/25/22  1333   POTASSIUM mmol/L 3 9 3 9 4 2   CHLORIDE mmol/L 99* 99* 99*   CO2 mmol/L 26 29 25   BUN mg/dL 28* 26* 27*   CREATININE mg/dL 1 45* 1 46* 1 51*   CALCIUM mg/dL 8 8 9 0 9 2   ALK PHOS U/L 126* 135* 147*   ALT U/L 20 20 22   AST U/L 14 15 12     Results from last 7 days   Lab Units 06/25/22  1602 06/24/22  1721   INR  1 02 1 00   PTT seconds 21* 26     Results from last 7 days   Lab Units 06/25/22  1333   MAGNESIUM mg/dL 2 2

## 2022-06-27 NOTE — PROGRESS NOTES
[facility]  Progress Note - Raquel Godwin 12/24/1930, 80 y o  female MRN: [id number]  Unit/Bed#: [facility] 930-01 Encounter: [id number]  Primary Care Provider: Kitty Yang DO   Date and time admitted to hospital: 6/24/2022  4:04 PM    Stroke (cerebrum) [facility])  Assessment & Plan  Neurology input appreciated  No new symptoms   Appreciated neurosurgery input appreciated      Dizziness  Assessment & Plan  Patient complaining of dizziness for the past 24 hours  Now resolved  Will monitor closely       Thrombocytopenia (HCC)  Assessment & Plan  Likely platelet count from initial CBC likely error  Brain mass  Assessment & Plan  Brain mass noted in CTA; may be meningioma  MRI - meningioma with ?edema   Neurosurgery input appreciated     I did reach out to them at the end of the day to evaluate to say if steroids may be beneficial for nausea      Primary hypothyroidism  Assessment & Plan  Continue levothyroxine 50 mcg daily   TSH  Mild vitamin D deficiency  Assessment & Plan  On cholecalciferol  Hyperuricemia  Assessment & Plan  Continue allopurinol 100 mg daily  Hyperlipidemia  Assessment & Plan  Due to atherosclerotic plaque; will check lipid profile tomorrow and may benefit starting Lipitor 40 mg     * Uncontrolled hypertension  Assessment & Plan  Appreciate cardiology input  Metoprolol changed to coreg  Will continue to monitor blood pressure  /72              VTE Pharmacologic Prophylaxis:   Moderate Risk (Score 3-4) - Pharmacological DVT Prophylaxis Ordered: heparin  Patient Centered Rounds: I performed bedside rounds with nursing staff today  Discussions with Specialists or Other Care Team Provider: Discussed with neurosurgery       Education and Discussions with Family / Patient: called ceferino Hartley a second time -   again got voicemail  Left message that as we approach the end of the day an update is less likely today - left message at 16:06  Time Spent for Care: 30 minutes  More than 50% of total time spent on counseling and coordination of care as described above  Current Length of Stay: 3 day(s)  Current Patient Status: Inpatient   Certification Statement: The patient will continue to require additional inpatient hospital stay due to steroids ? for meningioma  Discharge Plan: Anticipate discharge in 24-48 hrs to rehab facility  Code Status: Level 1 - Full Code    Subjective:   Patient seen and examined   Feeling "okay"  Still complaining of nausea    Objective:     Vitals:   Temp (24hrs), Av 7 °F (36 5 °C), Min:97 4 °F (36 3 °C), Max:98 3 °F (36 8 °C)    Temp:  [97 4 °F (36 3 °C)-98 3 °F (36 8 °C)] 98 3 °F (36 8 °C)  HR:  [71-84] 72  Resp:  [16-20] 18  BP: (127-160)/() 127/62  SpO2:  [93 %-98 %] 97 %  Body mass index is 39 05 kg/m²  Input and Output Summary (last 24 hours): Intake/Output Summary (Last 24 hours) at 2022 1602  Last data filed at 2022 1001  Gross per 24 hour   Intake 140 ml   Output 930 ml   Net -790 ml       Physical Exam:   Physical Exam  Constitutional:       General: She is not in acute distress  Appearance: She is not ill-appearing, toxic-appearing or diaphoretic  HENT:      Head: Normocephalic  Mouth/Throat:      Mouth: Mucous membranes are moist    Eyes:      General: No scleral icterus  Right eye: No discharge  Left eye: No discharge  Pupils: Pupils are equal, round, and reactive to light  Cardiovascular:      Rate and Rhythm: Normal rate  Heart sounds: No murmur heard  No friction rub  No gallop  Pulmonary:      Effort: Pulmonary effort is normal    Abdominal:      General: There is no distension  Palpations: There is no mass  Tenderness: There is no abdominal tenderness  There is no right CVA tenderness, left CVA tenderness, guarding or rebound  Musculoskeletal:         General: No swelling, tenderness, deformity or signs of injury  Right lower leg: No edema  Left lower leg: No edema  Skin:     Capillary Refill: Capillary refill takes less than 2 seconds  Coloration: Skin is not jaundiced or pale  Findings: No bruising, lesion or rash  Neurological:      Mental Status: She is alert  Psychiatric:         Mood and Affect: Mood normal           Additional Data:     Labs:  Results from last 7 days   Lab Units 06/27/22  0547   WBC Thousand/uL 12 82*   HEMOGLOBIN g/dL 12 1   HEMATOCRIT % 37 2   PLATELETS Thousands/uL 224   NEUTROS PCT % 72   LYMPHS PCT % 15   MONOS PCT % 12   EOS PCT % 1     Results from last 7 days   Lab Units 06/27/22  0547   SODIUM mmol/L 132*   POTASSIUM mmol/L 3 9   CHLORIDE mmol/L 99*   CO2 mmol/L 26   BUN mg/dL 28*   CREATININE mg/dL 1 45*   ANION GAP mmol/L 7   CALCIUM mg/dL 8 8   ALBUMIN g/dL 2 9*   TOTAL BILIRUBIN mg/dL 0 56   ALK PHOS U/L 126*   ALT U/L 20   AST U/L 14   GLUCOSE RANDOM mg/dL 122     Results from last 7 days   Lab Units 06/25/22  1602   INR  1 02         Results from last 7 days   Lab Units 06/27/22  0547 06/25/22  1333   HEMOGLOBIN A1C % 6 0* 6 0*     Results from last 7 days   Lab Units 06/24/22  1737 06/24/22  1654   LACTIC ACID mmol/L 1 9  --    PROCALCITONIN ng/ml  --  0 06       Lines/Drains:  Invasive Devices  Report    Peripheral Intravenous Line  Duration           Peripheral IV 06/27/22 Right;Ventral (anterior) Wrist <1 day                  Telemetry:  Telemetry Orders (From admission, onward)             48 Hour Telemetry Monitoring  Continuous x 48 hours        References:    Telemetry Guidelines   Question:  Reason for 48 Hour Telemetry  Answer:  Acute CVA (<24 hrs old, hemispheric strokes, selected brainstem strokes, cardiac arrhythmias)                 Telemetry Reviewed: Normal Sinus Rhythm  Indication for Continued Telemetry Use: Acute CVA             Imaging: No pertinent imaging reviewed      Recent Cultures (last 7 days):   Results from last 7 days   Lab Units 06/24/22  1654 06/24/22  1645   BLOOD CULTURE  No Growth at 48 hrs  No Growth at 48 hrs  Last 24 Hours Medication List:   Current Facility-Administered Medications   Medication Dose Route Frequency Provider Last Rate    acetaminophen  650 mg Oral Q6H PRN Nahed Donaldson MD      allopurinol  100 mg Oral Daily Nahed Donaldson MD      aluminum-magnesium hydroxide-simethicone  30 mL Oral Q6H PRN Nahed Donaldson MD      aspirin  81 mg Oral Daily Harsha Mcconnell MD      atorvastatin  40 mg Oral QPM Harsha Mcconnell MD      busPIRone  10 mg Oral TID Nahed Donaldson MD      carvedilol  25 mg Oral BID With Meals Raiza Kaplice 1, CRNP      cefTRIAXone  1,000 mg Intravenous Q24H Harsha Mcconnell MD Stopped (06/27/22 0930)    cholecalciferol  1,000 Units Oral Daily Nahed Donaldson MD      co-enzyme Q-10  100 mg Oral Daily Nahed Donaldson MD      vitamin B-12  1,000 mcg Oral Daily Nahed Donaldson MD      docusate sodium  100 mg Oral BID PRN Nahed Donaldson MD      famotidine  20 mg Oral Daily Nahed Donaldson MD      furosemide  20 mg Oral Daily PRN Nahed Donaldson MD      labetalol  10 mg Intravenous Q6H PRN Willean Pill, CRNP      levothyroxine  50 mcg Oral Early Morning Nahed Donaldson MD      lisinopril  20 mg Oral Daily Nahed Donaldson MD      loratadine  10 mg Oral Daily Nahed Donaldson MD      LORazepam  0 5 mg Oral BID Nahed Donaldson MD      magnesium oxide  400 mg Oral Daily Nahed Donaldson MD      metroNIDAZOLE  500 mg Intravenous Celsa Link  mg (06/27/22 1017)    multivitamin-minerals  1 tablet Oral Daily Nahed Donaldson MD      ondansetron  4 mg Intravenous Q6H PRN Nahed Donaldson MD      pantoprazole  40 mg Oral Daily Nahed Donaldson MD          Today, Patient Was Seen By: Harsha Mcconnell MD    **Please Note: This note may have been constructed using a voice recognition system  **

## 2022-06-27 NOTE — ASSESSMENT & PLAN NOTE
· Incidentally noted brain mass on CT head  Grown in size compared to CT head from 2006  · MRI confirmed suspected meningioma  · Unlikely to be contributing to symptoms at presentation     Imaging:   · MRI brain 06/25/2022:  Punctate foci of acute infarct and right hippocampus and posterior periventricular white matter  Approximately 2 5 cm right frontal meningioma with small amount of associated vasogenic edema  Mild-to-moderate cerebral and pontine microangiopathic disease  Plan:   · Continue ongoing medical management at this time   · Continue to monitor neurological status  Oriented x3 and non-focal exam at this time   · Mri brain with evidence of acute punctate stroke  · Incidentally noted meningioma unlikely related to presenting symptoms  · Given patients age and medical comorbidities at this time, recommend ongoing medical management and plan to follow up in 3 months with repeat MRI brain with and without contrast   · Order placed at this time  · No further acute neurosurgical needs or recommendations  · Will sign off and follow up outpatient in 3 months  Can be completed sooner if necessary

## 2022-06-27 NOTE — ASSESSMENT & PLAN NOTE
Brain mass noted in CTA; may be meningioma    MRI - meningioma with ?edema   Neurosurgery input appreciated     I did reach out to them at the end of the day to evaluate to say if steroids may be beneficial for nausea

## 2022-06-27 NOTE — PLAN OF CARE
Problem: PHYSICAL THERAPY ADULT  Goal: Performs mobility at highest level of function for planned discharge setting  See evaluation for individualized goals  Description: Treatment/Interventions: ADL retraining, Functional transfer training, LE strengthening/ROM, Cognitive reorientation, Patient/family training, Equipment eval/education, Bed mobility, Gait training, Spoke to nursing, Spoke to case management, OT          See flowsheet documentation for full assessment, interventions and recommendations  Note: Prognosis: Good  Problem List: Decreased strength, Decreased endurance, Decreased mobility, Impaired balance, Decreased coordination, Impaired judgement, Decreased safety awareness  Assessment: Pt seen for high complexity PT evaluation  Pt with active PT eval/treat and up and OOB as tolerated orders  Pt is a 80 y o  female who was admitted to [facility] on 6/24/2022 with uncontrolled hypertension  Pt's current dx/ problem list include: hyperlipidemia, hyperuricemia, brain mass, dizziness, and stroke  Comorbidities affecting pt's physical performance at time of assessment are as follows:  has a past medical history of Arthritis, Malignant neoplasm of breast ([address] ), and Sciatica  Personal factors affecting pt at time of initial examination include: steps to enter environment, limited home support, advanced age, past experience, inability to perform IADLs, inability to perform ADLs, inability to ambulate household distances, limited insight into impairments  Due to acute medical issues, ongoing medical workup for primary dx; pain, fall risk, increased reliance on more restrictive AD compared to baseline;  decreased activity tolerance compared to baseline, increased assistance needed from caregiver at current time, multiple lines, decline in overall functional mobility status; health management issues; note unstable clinical picture (high complexity)   At baseline, pt resides with daughter and WILLIAM in [place] with 3 [address] and was requiring A for ADLs/ IADLs prior to hospital admission  Currently, upon initial examination, pt  is requiring mod A for bed mobility skills; mod Ax2 for functional transfers and mod Ax2 for ambulation with b/l HHA  Currently, pt presents functioning below baseline and with overall mobility deficits 2* to: decreased LE strength/AROM; limited flexibility;  generalized weakness/ deconditioning; decreased endurance; decreased activity tolerance; decreased coordination; impaired balance; gait deviations; decreased safety awareness; fatigue; impaired safety and judgement; limited insight into current deficits; bed/ chair alarms; multiple lines; hearing impairment/ visual impairments; as well as: weakness, decreased ROM, impaired balance, decreased endurance, impaired coordination, gait deviations, decreased activity tolerance, decreased functional mobility tolerance, impaired judgement, fall risk and decreased cognition  Pt currently at increased risk for falls  At the end of evaluation, pt was left sitting OOB in bedside chair with alarm on with all needs in reach  Pt would benefit from continued skilled PT services while in hospital to address remaining limitations and maximize functional potential  PT to continue to follow pt and recommends rehab upon d/c  The patient's AM-PAC Basic Mobility Inpatient Short Form Raw Score is 9  A Raw score of less than or equal to 16 suggests the patient may benefit from discharge to post-acute rehabilitation services  Please also refer to the recommendation of the Physical Therapist for safe discharge planning  PT Discharge Recommendation: Post acute rehabilitation services          See flowsheet documentation for full assessment

## 2022-06-27 NOTE — ASSESSMENT & PLAN NOTE
· 1 day history of dizziness  Resolved prior to admission   · BP >220/100   · Continue medical therapies  No longer complaining of dizziness

## 2022-06-27 NOTE — CONSULTS
[address] [facility] 12/24/1930, 80 y o  female MRN: [id number]  [facility] En[facility]: [id number]  Primary Care Provider: Christie Caban DO   Date and time admitted to hospital: 6/24/2022  4:04 PM    Inpatient consult to Neurosurgery  Consult performed by: Twila Arriaza PA-C  Consult ordered by: Ailin Encarnacion MD          Brain mass  Assessment & Plan  · Incidentally noted brain mass on CT head  Grown in size compared to CT head from 2006  · MRI confirmed suspected meningioma  · Unlikely to be contributing to symptoms at presentation     Imaging:   · MRI brain 06/25/2022:  Punctate foci of acute infarct and right hippocampus and posterior periventricular white matter  Approximately 2 5 cm right frontal meningioma with small amount of associated vasogenic edema  Mild-to-moderate cerebral and pontine microangiopathic disease  Plan:   · Continue ongoing medical management at this time   · Continue to monitor neurological status  Oriented x3 and non-focal exam at this time   · Mri brain with evidence of acute punctate stroke  · Incidentally noted meningioma unlikely related to presenting symptoms  · Given patients age and medical comorbidities at this time, recommend ongoing medical management and plan to follow up in 3 months with repeat MRI brain with and without contrast   · Order placed at this time  · No further acute neurosurgical needs or recommendations  · Will sign off and follow up outpatient in 3 months  Can be completed sooner if necessary  Stroke (cerebrum) [facility])  Assessment & Plan  · Neurology consulted and following  · Appreciate ongoing recommendations and stroke care    Dizziness  Assessment & Plan  · 1 day history of dizziness  Resolved prior to admission   · BP >220/100   · Continue medical therapies  No longer complaining of dizziness       * Uncontrolled hypertension  Assessment & Plan  · Cardiology and medical primary team following  · Continue antihypertensives as directed  History of Present Illness   HPI: Olegario Wells is a 80 y o  female with PMH including arthritis, breast CA, sciatica who presents with complaint of dizziness for 24 hours, SOB and dyspnea on exertion  She was found to have elevated blood pressure in the ED >220/100  Patient currently at this time has no complaints or concerns  She was resting and is tired  She did vomit this morning per nursing staff after receiving a large volume of oral pills with apple sauce and not eating anything else orally  Patient has no headaches, confusion, changes in vision, trouble with speech, dizziness, lightheadedness, numbness, tingling, weakness in the arms or legs  Review of Systems   Constitutional: Positive for fatigue  Negative for activity change and appetite change  HENT: Negative  Respiratory: Negative  Cardiovascular: Negative  Genitourinary: Negative  Musculoskeletal: Negative  Neurological: Negative  Psychiatric/Behavioral: Negative for agitation, behavioral problems and confusion  Historical Information   Past Medical History:   Diagnosis Date    Arthritis     Malignant neoplasm of breast ([facility] [address] )     Resolved:  Oct 26, 2015    Sciatica     last assessed: 2013     Past Surgical History:   Procedure Laterality Date    BREAST BIOPSY      BREAST LUMPECTOMY      CARDIAC SURGERY      CATARACT EXTRACTION      CORONARY ARTERY BYPASS GRAFT  2009    ROTATOR CUFF REPAIR      TONSILLECTOMY      TOTAL ABDOMINAL HYSTERECTOMY       Social History     Substance and Sexual Activity   Alcohol Use Yes    Comment: social      Social History     Substance and Sexual Activity   Drug Use No     Social History     Tobacco Use   Smoking Status Former Smoker    Types: Cigarettes    Quit date:     Years since quittin 5   Smokeless Tobacco Never Used     Family History   Problem Relation Age of Onset    Other Mother         Maternal coagulation defect complicating pregnancy/childbirth/puerperium     Emphysema Father     Other Sister         Pacemaker Placement     Coronary artery disease Brother     Melanoma Daughter        Meds/Allergies   all current active meds have been reviewed, current meds:   Current Facility-Administered Medications   Medication Dose Route Frequency    acetaminophen (TYLENOL) tablet 650 mg  650 mg Oral Q6H PRN    allopurinol (ZYLOPRIM) tablet 100 mg  100 mg Oral Daily    aluminum-magnesium hydroxide-simethicone (MYLANTA) oral suspension 30 mL  30 mL Oral Q6H PRN    aspirin chewable tablet 81 mg  81 mg Oral Daily    atorvastatin (LIPITOR) tablet 40 mg  40 mg Oral QPM    busPIRone (BUSPAR) tablet 10 mg  10 mg Oral TID    carvedilol (COREG) tablet 25 mg  25 mg Oral BID With Meals    cefTRIAXone (ROCEPHIN) 1,000 mg in dextrose 5 % 50 mL IVPB  1,000 mg Intravenous Q24H    cholecalciferol (VITAMIN D3) tablet 1,000 Units  1,000 Units Oral Daily    co-enzyme Q-10 capsule 100 mg  100 mg Oral Daily    cyanocobalamin (VITAMIN B-12) tablet 1,000 mcg  1,000 mcg Oral Daily    docusate sodium (COLACE) capsule 100 mg  100 mg Oral BID PRN    famotidine (PEPCID) tablet 20 mg  20 mg Oral Daily    furosemide (LASIX) tablet 20 mg  20 mg Oral Daily PRN    labetalol (NORMODYNE) injection 10 mg  10 mg Intravenous Q6H PRN    levothyroxine tablet 50 mcg  50 mcg Oral Early Morning    lisinopril (ZESTRIL) tablet 20 mg  20 mg Oral Daily    loratadine (CLARITIN) tablet 10 mg  10 mg Oral Daily    LORazepam (ATIVAN) tablet 0 5 mg  0 5 mg Oral BID    magnesium oxide (MAG-OX) tablet 400 mg  400 mg Oral Daily    metroNIDAZOLE (FLAGYL) IVPB (premix) 500 mg 100 mL  500 mg Intravenous Q8H    multivitamin-minerals (CENTRUM) tablet 1 tablet  1 tablet Oral Daily    ondansetron (ZOFRAN) injection 4 mg  4 mg Intravenous Q6H PRN    pantoprazole (PROTONIX) EC tablet 40 mg  40 mg Oral Daily    and PTA meds: Prior to Admission Medications   Prescriptions Last Dose Informant Patient Reported? Taking?    Cholecalciferol (D3-1000) 1000 units tablet   Yes No   Sig: Take 1,000 Units by mouth daily   Coenzyme Q10 (CO Q 10) 100 MG CAPS   Yes No   Sig: Take by mouth   LORazepam (ATIVAN) 0 5 mg tablet   Yes Yes   Sig: Take 0 5 mg by mouth 2 (two) times a day   Magnesium 100 MG TABS  Family Member Yes No   Sig: Take by mouth   Multiple Vitamins-Minerals (PRESERVISION AREDS) CAPS  Family Member Yes No   Sig: Take by mouth   allopurinol (ZYLOPRIM) 100 mg tablet   No No   Sig: Take 1 tablet (100 mg total) by mouth daily   aspirin 81 mg chewable tablet  Family Member Yes No   Sig: Chew 1 tablet daily   busPIRone (BUSPAR) 10 mg tablet   No No   Sig: Take 1 tablet (10 mg total) by mouth 3 (three) times a day   cetirizine (ZyrTEC) 10 mg tablet   Yes No   Sig: Take 10 mg by mouth daily   cyanocobalamin (VITAMIN B-12) 1,000 mcg tablet  Family Member Yes No   Sig: Take by mouth   famotidine (PEPCID) 20 mg tablet   No No   Sig: Take 1 tablet (20 mg total) by mouth 2 (two) times a day   furosemide (LASIX) 20 mg tablet   No No   Sig: TAKE 1 TABLET(20 MG) BY MOUTH DAILY AS NEEDED FOR SWELLING   ketoconazole (NIZORAL) 2 % cream   No No   Sig: Apply topically daily   levothyroxine 50 mcg tablet   No No   Sig: Take 1 tablet (50 mcg total) by mouth daily   lisinopril (ZESTRIL) 40 mg tablet   No No   Sig: Take 0 5 tablets (20 mg total) by mouth daily   meloxicam (MOBIC) 7 5 mg tablet   No No   Sig: TAKE 1 TABLET(7 5 MG) BY MOUTH DAILY   metoprolol succinate (TOPROL-XL) 200 MG 24 hr tablet   No No   Sig: Take 0 5 tablets (100 mg total) by mouth 3 (three) times a day   pantoprazole (PROTONIX) 40 mg tablet   No No   Sig: Take 1 tablet (40 mg total) by mouth daily      Facility-Administered Medications: None     Allergies   Allergen Reactions    Meperidine Nausea Only    Tramadol Nausea Only       Objective   I/O       06/25 0701 06/26 0700 06/26 0701  06/27 0700 06/27 0701  06/28 0700    P  O  240      I V  (mL/kg) 40 (0 4) 40 (0 4)     IV Piggyback 250 100     Total Intake(mL/kg) 530 (5 4) 140 (1 4)     Urine (mL/kg/hr) 464 (0 2) 580 (0 2) 350 (0 7)    Total Output 464 580 350    Net +66 -440 -350           Unmeasured Urine Occurrence 4 x            Physical Exam  Constitutional:       Appearance: Normal appearance  She is well-developed  HENT:      Head: Normocephalic and atraumatic  Eyes:      Extraocular Movements: Extraocular movements intact and EOM normal       Pupils: Pupils are equal, round, and reactive to light  Neck:      Vascular: No JVD  Trachea: No tracheal deviation  Cardiovascular:      Rate and Rhythm: Normal rate  Pulmonary:      Effort: Pulmonary effort is normal  No respiratory distress  Musculoskeletal:         General: No tenderness or deformity  Normal range of motion  Cervical back: Normal range of motion and neck supple  Skin:     General: Skin is warm and dry  Neurological:      Mental Status: She is alert  Cranial Nerves: No cranial nerve deficit  Sensory: No sensory deficit  Motor: No weakness  Deep Tendon Reflexes: Reflexes are normal and symmetric  Psychiatric:         Speech: Speech normal          Behavior: Behavior normal          Thought Content: Thought content normal        Neurologic Exam     Mental Status   Oriented to person  Oriented to place  Oriented to city  Disoriented to time  Disoriented to month and date  Oriented to year  Attention: normal    Speech: speech is normal   Level of consciousness: alert  Knowledge: good  Normal comprehension  Cranial Nerves     CN III, IV, VI   Pupils are equal, round, and reactive to light  Extraocular motions are normal    Upgaze: normal  Downgaze: normal    CN V   Facial sensation intact  CN VII   Facial expression full, symmetric       CN VIII   CN VIII normal    Hearing: intact    CN XII   CN XII normal    Tongue deviation: none    Motor Exam   Muscle bulk: normal  Right arm tone: normal  Left arm tone: normal  Right leg tone: normal  Left leg tone: normal    Strength   Right deltoid: 5/5  Left deltoid: 5/5  Right biceps: 5/5  Left biceps: 5/5  Right triceps: 5/5  Left triceps: 5/5  Right wrist flexion: 5/5  Left wrist flexion: 5/5  Right wrist extension: 5/5  Left wrist extension: 5/5  Right iliopsoas: 5/5  Left iliopsoas: 5/5  Right quadriceps: 5/5  Left quadriceps: 5/5  Right hamstrin/5  Left hamstrin/5  Right anterior tibial: 5/5  Left anterior tibial: 5/5  Right gastroc: 5/5  Left gastroc: 5/5    Sensory Exam   Light touch normal      Gait, Coordination, and Reflexes     Tremor   Resting tremor: absent  Action tremor: absent        Vitals:Blood pressure 147/74, pulse 82, temperature 98 3 °F (36 8 °C), resp  rate 18, height 5' 3" (1 6 m), weight 100 kg (220 lb 7 4 oz), SpO2 94 %  ,Body mass index is 39 05 kg/m²       Lab Results:   Results from last 7 days   Lab Units 22  0547 22  0807 22  1602   WBC Thousand/uL 12 82* 13 32* 15 82*   HEMOGLOBIN g/dL 12 1 13 2 13 6   HEMATOCRIT % 37 2 39 4 40 5   PLATELETS Thousands/uL 224 252 236   NEUTROS PCT % 72 76* 84*   MONOS PCT % 12 8 5     Results from last 7 days   Lab Units 22  0547 22  0807 22  1333   POTASSIUM mmol/L 3 9 3 9 4 2   CHLORIDE mmol/L 99* 99* 99*   CO2 mmol/L 26 29 25   BUN mg/dL 28* 26* 27*   CREATININE mg/dL 1 45* 1 46* 1 51*   CALCIUM mg/dL 8 8 9 0 9 2   ALK PHOS U/L 126* 135* 147*   ALT U/L 20 20 22   AST U/L 14 15 12     Results from last 7 days   Lab Units 22  1333   MAGNESIUM mg/dL 2 2     Results from last 7 days   Lab Units 22  1333   PHOSPHORUS mg/dL 3 6     Results from last 7 days   Lab Units 22  1602 22  1721   INR  1 02 1 00   PTT seconds 21* 26     No results found for: TROPONINT  ABG:No results found for: PHART, UFG4EIP, PO2ART, RBN0NQS, M3JYSFKA, BEART, SOURCE    Imaging Studies: I have personally reviewed pertinent reports  and I have personally reviewed pertinent films in PACS    CT abdomen pelvis wo contrast    Result Date: 6/25/2022  Impression: Colonic diverticulosis with minimal inflammatory changes around the sigmoid colon which may represent acute diverticulitis  No drainable fluid collection  Recommend posttreatment colonoscopy to rule out underlying neoplasm  Distended gallbladder with gallstones and sludge  If clinically warranted right upper quadrant sonogram may be obtained for further evaluation The study was marked in EPIC for immediate notification  Workstation performed: [id number]     CTA head and neck with and without contrast    Result Date: 6/24/2022  Impression: 1  Moderate to severe (60-80%) in the the 4th segment of the left vertebral artery secondary to atherosclerotic plaque  No hemodynamically significant stenosis, dissection or occlusion of the carotid arteries  2   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  Recommend [facility] consultation  No hemodynamically significant stenosis or occlusion of the major vessels of the [place]  3   Right frontal extra-axial enhancing, partially calcified 2 6 cm mass suggesting a meningioma  Small mild vasogenic edema along the posterior aspect  Recommend MRI of the brain with gadolinium  4   No evidence of acute infarct, significant mass effect or intracranial hemorrhage  Workstation performed: [id number]     XR chest portable    Result Date: 6/25/2022  Impression: No acute cardiopulmonary disease  Workstation performed: [id number]     MRI brain w wo contrast    Result Date: 6/26/2022  Impression: Punctate foci of acute infarction in the right hippocampus and posterior periventricular white matter  Approximate 2 5 cm right frontal meningioma with associated vasogenic edema vasogenic edema  Follow-up with the neurosurgical service is recommended   Mild to moderate cerebral and pontine chronic microangiopathic disease  I personally discussed this study with Amna Bello on 6/26/2022 at 8:58 AM  Workstation performed: [id number]     US right upper quadrant    Result Date: 6/25/2022  Impression: 1  Limited exam demonstrating cholelithiasis and biliary sludge without sonographic evidence of acute cholecystitis  Workstation performed: [id number]       EKG, Pathology, and Other Studies: I have personally reviewed pertinent reports  VTE Prophylaxis: Sequential compression device Fady Holter)     Code Status: Level 1 - Full Code  Advance Directive and Living Will:      Power of :    POLST:      Counseling / Coordination of Care  I spent 20 minutes with the patient

## 2022-06-27 NOTE — PLAN OF CARE
Problem: OCCUPATIONAL THERAPY ADULT  Goal: Performs self-care activities at highest level of function for planned discharge setting  See evaluation for individualized goals  Description: Treatment Interventions: ADL retraining, Functional transfer training, UE strengthening/ROM, Endurance training, Cognitive reorientation, Patient/family training, Equipment evaluation/education, Compensatory technique education, Continued evaluation, Energy conservation, Activityengagement          See flowsheet documentation for full assessment, interventions and recommendations  Note: Limitation: Decreased ADL status, Decreased UE strength, Decreased UE ROM, Decreased Safe judgement during ADL, Decreased cognition, Decreased endurance, Decreased self-care trans, Decreased high-level ADLs  Prognosis: Fair  Assessment: Pt is a 80 y o  female admitted to [facility] on [facility]/24/2022 w/ Uncontrolled hypertension, brain mass, cerebrum stroke   has a past medical history of Arthritis, Malignant neoplasm of breast,  CAD, macular degeneration, PAD, spinal stenosis, generalized anxiety disorder, OA, and Sciatica  Pt with active OT orders and up and OOB as tolerated orders  Pt seen as a co-evaluation with PT due to the patient's co-morbidities, clinically unstable presentation/clinical complexity, and present impairments  Pt is a poor historian  Per chart, pta, pt resides with her dtr and WILLIAM in a 2STH, FFSU, 3STE  Pt required A w/  ADLS and IADLS  Upon evaluation, pt currently requires MOD A x 2 for transfers and mobility  Pt currently requires S eating, MIN A grooming, MIN A UB ADLs, MOD A LB ADLs, and MOD A toileting  Pt is limited at this time 2*: endurance, activity tolerance, functional mobility, balance, functional standing tolerance, decreased I w/ ADLS/IADLS, strength, ROM, cognitive impairments, decreased safety awareness and decreased insight into deficits  The following Occupational Performance Areas to address include: eating, grooming, bathing/shower, toilet hygiene, dressing, health maintenance, functional mobility, community mobility and clothing management  Pt to benefit from immediate acute skilled OT to address above deficits, improve overall functional independence, maximize fnxl mobility and reduce caregiver burden  From OT standpoint, recommendation at time of d/c would be STR  Pt was left in chair with alarm on after session with all current needs met  The patient's raw score on the AM-PAC Daily Activity inpatient short form is 15, standardized score is 34 69, less than 39 4  Patients at this level are likely to benefit from discharge to post-acute rehabilitation services  Please refer to the recommendation of the Occupational Therapist for safe discharge planning       OT Discharge Recommendation: Post acute rehabilitation services  OT - OK to Discharge: Yes (to rehab when medically stable)

## 2022-06-27 NOTE — ASSESSMENT & PLAN NOTE
Appreciate cardiology input    Metoprolol changed to coreg  Will continue to monitor blood pressure  /72

## 2022-06-28 NOTE — ASSESSMENT & PLAN NOTE
Brain mass noted in CTA; may be meningioma    MRI - meningioma with ?edema   Neurosurgery input appreciated; meningioma likely incidental finding and no surgical intervention planned  Follow-up in 3 months with MRI; neurosurgery placed order  Will continue steroid taper

## 2022-06-28 NOTE — PROGRESS NOTES
Cardiology Progress Note - Team 1  Peace Shereen 80 y o  female MRN: [id number]  Unit/Bed#: [facility] 930-01 Encounter: [id number]      Assessment/Plan:  1  Accelerated hypertension  - possibly provoked in the setting of #3  - /110 on admission - last documented at 166/80  - outpatient BP regimen - lisinopril 20 mg daily, and metoprolol succinate 100 mg t i d   - inpatient BP regimen - lisinopril 20 mg p o  daily, carvedilol 25 mg p o  B i d   - telemetry review - NSR, HR 70s to 80s, one 3 beat run of NSVT last evening  - carvedilol increased to 25 mg p o  B i d  yesterday, but BPs remain elevated   - add amlodipine 5 mg p o  Daily   - would hold off on up titrating lisinopril given elevated kidney function     2  Acute stroke  - MRI brain - punctate foci of acute infarction in the right hippocampus and posterior periventricular white matter, 2 5 cm right frontal meningioma with associated vasogenic edema   - neurosurgery following - recommend medical management with repeat MRI brain in 3 months  - continue aspirin 81 mg daily p o , atorvastatin 40 mg p o  daily     3  Abdominal pain/nausea/vomiting  - GI following - recommending conservative treatments     4   Thrombocytopenia   - POA - platelet count 22 on admission > 225 this morning  - heme/Onc following     5  CAD s/p CABG x 6 (2009)  - no recent ischemic testing for review  - subjectively denies any anginal symptoms  - HS troponin unremarkable  - presenting EKG on admission NSR, nonspecific ST T-wave abnormalities  - continue carvedilol, aspirin, atorvastatin     6  Chronic diastolic CHF  - appears compensated  - echo yesterday - LVEF 65%, RWM cannot be excluded on the basis of the study, grade 1 DD, mildly dilated LA/RA, mild TR; grossly unchanged from previous study in 2014   - previously on oral furosemide 20 mg daily as needed for lower extremity edema     7  Dyslipidemia  - lipid profile (6/25/2022) - cholesterol 261/ triglycerides 169/ HDL 57/ U 751   - continue atorvastatin     8  Morbid obesity  - BMI 38 6     9  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  - creatinine 1 3 on admission > 1 62 this morning  - baseline creatinine 1 3-1 5     Subjective:   Patient seen and examined  No significant events overnight  Patient states she is surprised how well she is feeling  Denies any CP, SOB, or palpitations  Objective:   Vitals: Blood pressure 166/80, pulse 80, temperature 98 1 °F (36 7 °C), resp  rate 16, height 5' 3" (1 6 m), weight 99 8 kg (220 lb 0 3 oz), SpO2 92 %  , Body mass index is 38 97 kg/m² ,   Orthostatic Blood Pressures    Flowsheet Row Most Recent Value   Blood Pressure 166/80 filed at 06/28/2022 7610   Patient Position - Orthostatic VS Sitting filed at 06/24/2022 2345          Intake/Output Summary (Last 24 hours) at 6/28/2022 0826  Last data filed at 6/28/2022 0301  Gross per 24 hour   Intake 320 ml   Output 900 ml   Net -580 ml     Physical Exam  Constitutional:       General: She is not in acute distress  Appearance: She is obese  She is not ill-appearing  HENT:      Head: Normocephalic  Nose: Nose normal       Mouth/Throat:      Mouth: Mucous membranes are moist       Pharynx: Oropharynx is clear  Eyes:      Pupils: Pupils are equal, round, and reactive to light  Cardiovascular:      Rate and Rhythm: Normal rate and regular rhythm  Pulses: Normal pulses  Heart sounds: Normal heart sounds  No murmur heard  No friction rub  Pulmonary:      Effort: Pulmonary effort is normal       Breath sounds: Normal breath sounds  No wheezing, rhonchi or rales  Abdominal:      Palpations: Abdomen is soft  Musculoskeletal:         General: Normal range of motion  Cervical back: Normal range of motion  Right lower leg: No edema  Left lower leg: No edema  Skin:     General: Skin is warm and dry  Capillary Refill: Capillary refill takes less than 2 seconds  Neurological:      General: No focal deficit present  Mental Status: She is alert and oriented to person, place, and time     Psychiatric:         Mood and Affect: Mood normal      Medications:    Current Facility-Administered Medications:     acetaminophen (TYLENOL) tablet 650 mg, 650 mg, Oral, Q6H PRN, Jennifer Saucedo MD    allopurinol (ZYLOPRIM) tablet 100 mg, 100 mg, Oral, Daily, Jennifer Saucedo MD, 100 mg at 06/27/22 0908    aluminum-magnesium hydroxide-simethicone (MYLANTA) oral suspension 30 mL, 30 mL, Oral, Q6H PRN, Jennifer Saucedo MD    amLODIPine (NORVASC) tablet 5 mg, 5 mg, Oral, Daily, RAO Lutz    aspirin chewable tablet 81 mg, 81 mg, Oral, Daily, Swati Regan MD, 81 mg at 06/27/22 0907    atorvastatin (LIPITOR) tablet 40 mg, 40 mg, Oral, QPM, Swati Regan MD, 40 mg at 06/27/22 1705    busPIRone (BUSPAR) tablet 10 mg, 10 mg, Oral, TID, Jennifer Saucedo MD, 10 mg at 06/27/22 2101    carvedilol (COREG) tablet 25 mg, 25 mg, Oral, BID With Meals, RAO Lutz, 25 mg at 06/27/22 1644    cefTRIAXone (ROCEPHIN) 1,000 mg in dextrose 5 % 50 mL IVPB, 1,000 mg, Intravenous, Q24H, Swati Regan MD, Stopped at 06/27/22 0930    cholecalciferol (VITAMIN D3) tablet 1,000 Units, 1,000 Units, Oral, Daily, Jennifer Saucedo MD, 1,000 Units at 06/27/22 0908    co-enzyme Q-10 capsule 100 mg, 100 mg, Oral, Daily, Jennifer Saucedo MD, 100 mg at 06/27/22 0908    cyanocobalamin (VITAMIN B-12) tablet 1,000 mcg, 1,000 mcg, Oral, Daily, Jennifer Saucedo MD, 1,000 mcg at 06/27/22 0908    dexamethasone (DECADRON) injection 4 mg, 4 mg, Intravenous, Q8H [address], Swati Regan MD, 4 mg at 06/28/22 0504    docusate sodium (COLACE) capsule 100 mg, 100 mg, Oral, BID PRN, Jennifer Saucedo MD    famotidine (PEPCID) tablet 20 mg, 20 mg, Oral, Daily, Jennifer Saucedo MD, 20 mg at 06/27/22 0908    furosemide (LASIX) tablet 20 mg, 20 mg, Oral, Daily PRN, Jennifer Saucedo MD    labetalol (NORMODYNE) injection 10 mg, 10 mg, Intravenous, Q6H PRN, Seema Bansal RAO Mauricio, 10 mg at 06/28/22 0343    levothyroxine tablet 50 mcg, 50 mcg, Oral, Early Morning, Luli Thomas MD, 50 mcg at 06/28/22 0504    lisinopril (ZESTRIL) tablet 20 mg, 20 mg, Oral, Daily, Luli Thomas MD, 20 mg at 06/27/22 0908    loratadine (CLARITIN) tablet 10 mg, 10 mg, Oral, Daily, Luli Thomas MD, 10 mg at 06/27/22 0908    LORazepam (ATIVAN) tablet 0 5 mg, 0 5 mg, Oral, BID, Luli Thomas MD, 0 5 mg at 06/27/22 2101    magnesium oxide (MAG-OX) tablet 400 mg, 400 mg, Oral, Daily, Luli Thomas MD, 400 mg at 06/27/22 0908    metroNIDAZOLE (FLAGYL) IVPB (premix) 500 mg 100 mL, 500 mg, Intravenous, Q8H, Phil Zuluaga MD, Stopped at 06/28/22 0046    multivitamin-minerals (CENTRUM) tablet 1 tablet, 1 tablet, Oral, Daily, Luli Thomas MD, 1 tablet at 06/27/22 0908    ondansetron (ZOFRAN) injection 4 mg, 4 mg, Intravenous, Q6H PRN, Luli Thomas MD, 4 mg at 06/27/22 1111    pantoprazole (PROTONIX) EC tablet 40 mg, 40 mg, Oral, Daily, Luli Thomas MD, 40 mg at 06/27/22 0908     Labs & Results:      Results from last 7 days   Lab Units 06/28/22 0612 06/27/22  0547 06/26/22  0807   WBC Thousand/uL 9 81 12 82* 13 32*   HEMOGLOBIN g/dL 12 7 12 1 13 2   HEMATOCRIT % 37 7 37 2 39 4   PLATELETS Thousands/uL 225 224 252     Results from last 7 days   Lab Units 06/27/22  0547 06/25/22  1333   TRIGLYCERIDES mg/dL 119 169*   HDL mg/dL 58 57     Results from last 7 days   Lab Units 06/28/22  0612 06/27/22  0547 06/26/22  0807   POTASSIUM mmol/L 4 1 3 9 3 9   CHLORIDE mmol/L 99* 99* 99*   CO2 mmol/L 24 26 29   BUN mg/dL 30* 28* 26*   CREATININE mg/dL 1 62* 1 45* 1 46*   CALCIUM mg/dL 9 0 8 8 9 0   ALK PHOS U/L 152* 126* 135*   ALT U/L 36 20 20   AST U/L 37 14 15     Results from last 7 days   Lab Units 06/25/22  1602 06/24/22  1721   INR  1 02 1 00   PTT seconds 21* 26     Results from last 7 days   Lab Units 06/25/22  1333   MAGNESIUM mg/dL 2 2

## 2022-06-28 NOTE — PLAN OF CARE
Problem: MOBILITY - ADULT  Goal: Maintain or return to baseline ADL function  Description: INTERVENTIONS:  -  Assess patient's ability to carry out ADLs; assess patient's baseline for ADL function and identify physical deficits which impact ability to perform ADLs (bathing, care of mouth/teeth, toileting, grooming, dressing, etc )  - Assess/evaluate cause of self-care deficits   - Assess range of motion  - Assess patient's mobility; develop plan if impaired  - Assess patient's need for assistive devices and provide as appropriate  - Encourage maximum independence but intervene and supervise when necessary  - Involve family in performance of ADLs  - Assess for home care needs following discharge   - Consider OT consult to assist with ADL evaluation and planning for discharge  - Provide patient education as appropriate  Outcome: Progressing  Goal: Maintains/Returns to pre admission functional level  Description: INTERVENTIONS:  - Perform BMAT or MOVE assessment daily    - Set and communicate daily mobility goal to care team and patient/family/caregiver  - Collaborate with rehabilitation services on mobility goals if consulted  - Perform Range of Motion  times a day  - Reposition patient every  hours    - Dangle patient  times a day  - Stand patient  times a day  - Ambulate patient  times a day  - Out of bed to chair  times a day   - Out of bed for meals  times a day  - Out of bed for toileting  - Record patient progress and toleration of activity level   Outcome: Progressing     Problem: Potential for Falls  Goal: Patient will remain free of falls  Description: INTERVENTIONS:  - Educate patient/family on patient safety including physical limitations  - Instruct patient to call for assistance with activity   - Consult OT/PT to assist with strengthening/mobility   - Keep Call bell within reach  - Keep bed low and locked with side rails adjusted as appropriate  - Keep care items and personal belongings within reach  - Initiate and maintain comfort rounds  - Make Fall Risk Sign visible to staff  - Offer Toileting every  Hours, in advance of need  - Initiate/Maintain alarm  - Obtain necessary fall risk management equipment:   - Apply yellow socks and bracelet for high fall risk patients  - Consider moving patient to room near nurses station  Outcome: Progressing     Problem: PAIN - ADULT  Goal: Verbalizes/displays adequate comfort level or baseline comfort level  Description: Interventions:  - Encourage patient to monitor pain and request assistance  - Assess pain using appropriate pain scale  - Administer analgesics based on type and severity of pain and evaluate response  - Implement non-pharmacological measures as appropriate and evaluate response  - Consider cultural and social influences on pain and pain management  - Notify physician/advanced practitioner if interventions unsuccessful or patient reports new pain  Outcome: Progressing     Problem: INFECTION - ADULT  Goal: Absence or prevention of progression during hospitalization  Description: INTERVENTIONS:  - Assess and monitor for signs and symptoms of infection  - Monitor lab/diagnostic results  - Monitor all insertion sites, i e  indwelling lines, tubes, and drains  - Monitor endotracheal if appropriate and nasal secretions for changes in amount and color  - [place] appropriate cooling/warming therapies per order  - Administer medications as ordered  - Instruct and encourage patient and family to use good hand hygiene technique  - Identify and instruct in appropriate isolation precautions for identified infection/condition  Outcome: Progressing  Goal: Absence of fever/infection during neutropenic period  Description: INTERVENTIONS:  - Monitor WBC    Outcome: Progressing     Problem: SAFETY ADULT  Goal: Maintain or return to baseline ADL function  Description: INTERVENTIONS:  -  Assess patient's ability to carry out ADLs; assess patient's baseline for ADL function and identify physical deficits which impact ability to perform ADLs (bathing, care of mouth/teeth, toileting, grooming, dressing, etc )  - Assess/evaluate cause of self-care deficits   - Assess range of motion  - Assess patient's mobility; develop plan if impaired  - Assess patient's need for assistive devices and provide as appropriate  - Encourage maximum independence but intervene and supervise when necessary  - Involve family in performance of ADLs  - Assess for home care needs following discharge   - Consider OT consult to assist with ADL evaluation and planning for discharge  - Provide patient education as appropriate  Outcome: Progressing  Goal: Maintains/Returns to pre admission functional level  Description: INTERVENTIONS:  - Perform BMAT or MOVE assessment daily    - Set and communicate daily mobility goal to care team and patient/family/caregiver  - Collaborate with rehabilitation services on mobility goals if consulted  - Perform Range of Motion  times a day  - Reposition patient every  hours    - Dangle patient  times a day  - Stand patient  times a day  - Ambulate patient times a day  - Out of bed to chair  times a day   - Out of bed for meals  times a day  - Out of bed for toileting  - Record patient progress and toleration of activity level   Outcome: Progressing  Goal: Patient will remain free of falls  Description: INTERVENTIONS:  - Educate patient/family on patient safety including physical limitations  - Instruct patient to call for assistance with activity   - Consult OT/PT to assist with strengthening/mobility   - Keep Call bell within reach  - Keep bed low and locked with side rails adjusted as appropriate  - Keep care items and personal belongings within reach  - Initiate and maintain comfort rounds  - Make Fall Risk Sign visible to staff  - Offer Toileting every  Hours, in advance of need  - Initiate/Maintainalarm  - Obtain necessary fall risk management equipment  - Apply yellow socks and bracelet for high fall risk patients  - Consider moving patient to room near nurses station  Outcome: Progressing     Problem: DISCHARGE PLANNING  Goal: Discharge to home or other facility with appropriate resources  Description: INTERVENTIONS:  - Identify barriers to discharge w/patient and caregiver  - Arrange for needed discharge resources and transportation as appropriate  - Identify discharge learning needs (meds, wound care, etc )  - Arrange for interpretive services to assist at discharge as needed  - Refer to Case Management Department for coordinating discharge planning if the patient needs post-hospital services based on physician/advanced practitioner order or complex needs related to functional status, cognitive ability, or social support system  Outcome: Progressing     Problem: Knowledge Deficit  Goal: Patient/family/caregiver demonstrates understanding of disease process, treatment plan, medications, and discharge instructions  Description: Complete learning assessment and assess knowledge base    Interventions:  - Provide teaching at level of understanding  - Provide teaching via preferred learning methods  Outcome: Progressing     Problem: Prexisting or High Potential for Compromised Skin Integrity  Goal: Skin integrity is maintained or improved  Description: INTERVENTIONS:  - Identify patients at risk for skin breakdown  - Assess and monitor skin integrity  - Assess and monitor nutrition and hydration status  - Monitor labs   - Assess for incontinence   - Turn and reposition patient  - Assist with mobility/ambulation  - Relieve pressure over bony prominences  - Avoid friction and shearing  - Provide appropriate hygiene as needed including keeping skin clean and dry  - Evaluate need for skin moisturizer/barrier cream  - Collaborate with interdisciplinary team   - Patient/family teaching  - Consider wound care consult   Outcome: Progressing     Problem: Nutrition/Hydration-ADULT  Goal: Nutrient/Hydration intake appropriate for improving, restoring or maintaining nutritional needs  Description: Monitor and assess patient's nutrition/hydration status for malnutrition  Collaborate with interdisciplinary team and initiate plan and interventions as ordered  Monitor patient's weight and dietary intake as ordered or per policy  Utilize nutrition screening tool and intervene as necessary  Determine patient's food preferences and provide high-protein, high-caloric foods as appropriate       INTERVENTIONS:  - Monitor oral intake, urinary output, labs, and treatment plans  - Assess nutrition and hydration status and recommend course of action  - Evaluate amount of meals eaten  - Assist patient with eating if necessary   - Allow adequate time for meals  - Recommend/ encourage appropriate diets, oral nutritional supplements, and vitamin/mineral supplements  - Order, calculate, and assess calorie counts as needed  - Recommend, monitor, and adjust tube feedings and TPN/PPN based on assessed needs  - Assess need for intravenous fluids  - Provide specific nutrition/hydration education as appropriate  - Include patient/family/caregiver in decisions related to nutrition  Outcome: Progressing

## 2022-06-28 NOTE — PROGRESS NOTES
[facility]  Progress Note - Srinivas Scott 12/24/1930, 80 y o  female MRN: [id number]  Unit/Bed#: [facility] 930-01 Encounter: [id number]  Primary Care Provider: Lexi Robert DO   Date and time admitted to hospital: 6/24/2022  4:04 PM    Stroke (cerebrum) [facility])  Assessment & Plan  Neurology input appreciated-continue aspirin/statin; better blood pressure control  Repeat MRI in 3 months  No new symptoms   Appreciated neurosurgery input appreciated      Dizziness  Assessment & Plan  Patient complaining of dizziness for the past 24 hours  Now resolved  Will monitor closely       Thrombocytopenia (HCC)  Assessment & Plan  Likely platelet count from initial CBC likely error  Brain mass  Assessment & Plan  Brain mass noted in CTA; may be meningioma  MRI - meningioma with ?edema   Neurosurgery input appreciated; meningioma likely incidental finding and no surgical intervention planned  Follow-up in 3 months with MRI; neurosurgery placed order  Will continue steroid taper        Primary hypothyroidism  Assessment & Plan  Continue levothyroxine 50 mcg daily  TSH within normal limits  Mild vitamin D deficiency  Assessment & Plan  On cholecalciferol  Hyperuricemia  Assessment & Plan  Continue allopurinol 100 mg daily  Hyperlipidemia  Assessment & Plan  Started on high-intensity statin    * Uncontrolled hypertension  Assessment & Plan  Appreciate cardiology input  Metoprolol changed to coreg 25 mg b i d  On 06/28-cardiology started amlodipine; continue 20 mg of lisinopril  Will continue to monitor blood pressure  Blood pressure still higher than goal; monitor overnight on increased antihypertensives as needed          VTE Pharmacologic Prophylaxis:   Pharmacologic: Heparin  Mechanical VTE Prophylaxis in Place: Yes    Patient Centered Rounds: I have performed bedside rounds with nursing staff today      Discussions with Specialists or Other Care Team Provider:     Education and Discussions with Family / Patient: Discussed treatment plan with family and patient who agree with current plan; encouraged to ask questions and participate  Time Spent for Care: 30 minutes  More than 50% of total time spent on counseling and coordination of care as described above  Current Length of Stay: 4 day(s)    Current Patient Status: Inpatient   Certification Statement: The patient will continue to require additional inpatient hospital stay due to Treatment of hypertensive urgency    Discharge Plan: To be determined; likely 24-48 hours    Code Status: Level 1 - Full Code      Subjective:   Patient seen examined bedside, no acute distress or discomfort noted  Blood pressure better controlled but still not at goal; await further Cardiology recommendations and monitor overnight  Will initiate steroid taper starting tomorrow  Monitor creatinine has slightly elevated today  Otherwise, continue present therapy and likely stable for discharge once blood pressure better control  Physical therapy recommending acute rehab  Objective:     Vitals:   Temp (24hrs), Av 1 °F (36 7 °C), Min:97 4 °F (36 3 °C), Max:98 4 °F (36 9 °C)    Temp:  [97 4 °F (36 3 °C)-98 4 °F (36 9 °C)] 97 4 °F (36 3 °C)  HR:  [72-84] 75  Resp:  [16-18] 16  BP: (144-180)/(68-83) 160/79  SpO2:  [91 %-95 %] 91 %  Body mass index is 38 97 kg/m²  Input and Output Summary (last 24 hours): Intake/Output Summary (Last 24 hours) at 2022 1651  Last data filed at 2022 1300  Gross per 24 hour   Intake 560 ml   Output 550 ml   Net 10 ml       Physical Exam:     Physical Exam  Vitals and nursing note reviewed  Constitutional:       General: She is not in acute distress  Appearance: She is well-developed  She is obese  HENT:      Head: Normocephalic and atraumatic  Eyes:      Conjunctiva/sclera: Conjunctivae normal    Cardiovascular:      Rate and Rhythm: Normal rate and regular rhythm  Heart sounds:  No murmur heard  Pulmonary:      Effort: Pulmonary effort is normal  No respiratory distress  Breath sounds: Normal breath sounds  Abdominal:      Palpations: Abdomen is soft  Tenderness: There is no abdominal tenderness  Musculoskeletal:      Cervical back: Neck supple  Skin:     General: Skin is warm and dry  Neurological:      Mental Status: She is alert  Additional Data:     Labs:    Results from last 7 days   Lab Units 06/28/22  0612 06/27/22  0547   WBC Thousand/uL 9 81 12 82*   HEMOGLOBIN g/dL 12 7 12 1   HEMATOCRIT % 37 7 37 2   PLATELETS Thousands/uL 225 224   NEUTROS PCT %  --  72   LYMPHS PCT %  --  15   MONOS PCT %  --  12   EOS PCT %  --  1     Results from last 7 days   Lab Units 06/28/22  0612   SODIUM mmol/L 133*   POTASSIUM mmol/L 4 1   CHLORIDE mmol/L 99*   CO2 mmol/L 24   BUN mg/dL 30*   CREATININE mg/dL 1 62*   ANION GAP mmol/L 10   CALCIUM mg/dL 9 0   ALBUMIN g/dL 3 0*   TOTAL BILIRUBIN mg/dL 0 48   ALK PHOS U/L 152*   ALT U/L 36   AST U/L 37   GLUCOSE RANDOM mg/dL 205*     Results from last 7 days   Lab Units 06/25/22  1602   INR  1 02         Results from last 7 days   Lab Units 06/27/22  0547 06/25/22  1333   HEMOGLOBIN A1C % 6 0* 6 0*     Results from last 7 days   Lab Units 06/24/22  1737 06/24/22  1654   LACTIC ACID mmol/L 1 9  --    PROCALCITONIN ng/ml  --  0 06           * I Have Reviewed All Lab Data Listed Above  * Additional Pertinent Lab Tests Reviewed: All Labs Within Last 24 Hours Reviewed    Imaging:    Imaging Reports Reviewed Today Include:  MRI brain  Imaging Personally Reviewed by Myself Includes:      Recent Cultures (last 7 days):     Results from last 7 days   Lab Units 06/24/22  1654 06/24/22  1645   BLOOD CULTURE  No Growth at 72 hrs  No Growth at 72 hrs         Last 24 Hours Medication List:   Current Facility-Administered Medications   Medication Dose Route Frequency Provider Last Rate    acetaminophen  650 mg Oral Q6H PRN Mayela Owusu MD  allopurinol  100 mg Oral Daily Ny Miller MD      aluminum-magnesium hydroxide-simethicone  30 mL Oral Q6H PRN Ny Miller MD      amLODIPine  5 mg Oral Daily RAO Powers      aspirin  81 mg Oral Daily Sidra Gómez MD      atorvastatin  40 mg Oral QPM Sidra Gómez MD      busPIRone  10 mg Oral TID Ny Miller MD      carvedilol  25 mg Oral BID With Meals Raiza Denise 1, CRNP      cefTRIAXone  1,000 mg Intravenous Q24H Sidra Gómez MD 1,000 mg (06/28/22 0924)    cholecalciferol  1,000 Units Oral Daily Ny Miller MD      co-enzyme Q-10  100 mg Oral Daily Ny Miller MD      vitamin B-12  1,000 mcg Oral Daily Ny Miller MD      dexamethasone  4 mg Intravenous [facility] Sidra Gómez MD      docusate sodium  100 mg Oral BID PRN Ny Miller MD      famotidine  20 mg Oral Daily Ny Miller MD      furosemide  20 mg Oral Daily PRN Ny Miller MD      labetalol  10 mg Intravenous Q6H PRN RAO Mosley      levothyroxine  50 mcg Oral Early Morning Ny Miller MD      lisinopril  20 mg Oral Daily Ny Miller MD      loratadine  10 mg Oral Daily Ny Miller MD      LORazepam  0 5 mg Oral BID Ny Miller MD      magnesium oxide  400 mg Oral Daily Ny Miller MD      metroNIDAZOLE  500 mg Intravenous Q8H Sidra Gómez  mg (06/28/22 0850)    multivitamin-minerals  1 tablet Oral Daily Ny Miller MD      ondansetron  4 mg Intravenous Q6H PRN Ny Miller MD      pantoprazole  40 mg Oral Daily Ny Miller MD          Today, Patient Was Seen By: Gabbi Hargrove MD    ** Please Note: Dictation voice to text software may have been used in the creation of this document   **

## 2022-06-28 NOTE — ASSESSMENT & PLAN NOTE
Appreciate cardiology input  Metoprolol changed to coreg 25 mg b i d    On 06/28-cardiology started amlodipine; continue 20 mg of lisinopril  Will continue to monitor blood pressure  Blood pressure still higher than goal; monitor overnight on increased antihypertensives as needed

## 2022-06-28 NOTE — ASSESSMENT & PLAN NOTE
Neurology input appreciated-continue aspirin/statin; better blood pressure control  Repeat MRI in 3 months  No new symptoms   Appreciated neurosurgery input appreciated

## 2022-06-29 PROBLEM — K57.92 DIVERTICULITIS: Status: ACTIVE | Noted: 2022-01-01

## 2022-06-29 NOTE — CASE MANAGEMENT
Case Management Discharge Planning Note    Patient name Kevin Swanson  Location [facility] 930/[facility] 930-01 MRN [id number]  : 1930 Date 2022       Current Admission Date: 2022  Current Admission Diagnosis:Uncontrolled hypertension   Patient Active Problem List    Diagnosis Date Noted    Diverticulitis 2022    Stroke (cerebrum) ([facility] [address] ) 2022    Brain mass 2022    Thrombocytopenia ([facility] [address] ) 2022    Dizziness 2022    Visual hallucination 2020    Encounter for follow-up examination after completed treatment for malignant neoplasm 2019    History of right breast cancer 2018    Macular degeneration 2017    Mixed stress and urge urinary incontinence 2017    Primary localized osteoarthritis of both knees 2016    PAD (peripheral artery disease) ([facility] [address] ) 2016    Hyperuricemia 10/09/2015    Mild vitamin D deficiency 2015    Spinal stenosis 2014    Gait disturbance 2014    Generalized osteoarthritis 2013    Peripheral neuropathy 2013    Other chronic pain 10/24/2012    Uncontrolled hypertension 2012    Obesity 2012    Hyperlipidemia 2012    Generalized anxiety disorder 2012    Insomnia 2012    Primary hypothyroidism 2012    3-vessel coronary artery disease 2012    Depression 2012    Esophageal reflux 2012      LOS (days): 5  Geometric Mean LOS (GMLOS) (days): 4 40  Days to GMLOS:-0 4     OBJECTIVE:  Risk of Unplanned Readmission Score: 12 02         Current admission status: Inpatient   Preferred Pharmacy:   [facility] Abdi Gerber , [facility] - [address] [place] [address]  [address]  [place] [address]  Phone: [phone] Fax: [phone] [place] [address]  [place] [address]  Phone: [phone] Fax: [phone]    Primary Care Provider: Diana Pavon DO    Primary Insurance: MEDICARE  Secondary Insurance: AETNA    DISCHARGE DETAILS:    Discharge planning discussed with[de-identified] Pt's dtr  Freedom of Choice: Yes  Comments - Freedom of Choice: d/c plan discussed and pt's dtr in agreement for pt to go to [facility] and for blanket referral to be sent  CM sent referral as requeted via [address]    CM contacted family/caregiver?: Yes  Were Treatment Team discharge recommendations reviewed with patient/caregiver?: Yes     Were patient/caregiver advised of the risks associated with not following Treatment Team discharge recommendations?: Yes    Contacts  Patient Contacts: Maree Dakin (Daughter)  Relationship to Patient[de-identified] Family  Contact Method: Phone  Phone Number: [phone]  Reason/Outcome: Continuity of Care, Referral, Discharge [address]         Is the patient interested in [address] at discharge?: No    DME Referral Provided  Referral made for DME?: No    Other Referral/Resources/Interventions Provided:  Interventions: Short Term Rehab         Treatment Team Recommendation: Short Term Rehab  Discharge Destination Plan[de-identified] Short Term Rehab

## 2022-06-29 NOTE — PROGRESS NOTES
Cardiology Progress Note - Team 1  Cheryl Wilde 80 y o  female MRN: [id number]  [facility] Encounter: [id number]      Assessment/Plan:  1  Accelerated hypertension  - possibly provoked in the setting of #3  - /110 on admission - last documented at 148/90  - outpatient BP regimen - lisinopril 20 mg daily, and metoprolol succinate 100 mg t i d   - inpatient BP regimen - lisinopril 20 mg p o  daily, carvedilol 25 mg p o  B i d , amlodipine 5 mg po daily (added yesterday)  - increase amlodipine to 10 mg po daily as BPs remain elevated   - would hold off on up titrating lisinopril given kidney function     2  Acute stroke/brain mass  - MRI brain - punctate foci of acute infarction in the right hippocampus and posterior periventricular white matter, 2 5 cm right frontal meningioma with associated vasogenic edema   - neurosurgery following - recommend medical management with repeat MRI brain in 3 months  - continue aspirin 81 mg daily p o , atorvastatin 40 mg p o  daily     3  CAD s/p CABG x 6 (2009)  - no recent ischemic testing for review  - subjectively denies any anginal symptoms  - HS troponin unremarkable  - presenting EKG on admission NSR, nonspecific ST T-wave abnormalities  - continue carvedilol, aspirin, atorvastatin     4  Chronic HFpEF  - appears compensated  - echo yesterday - LVEF 65%, RWM cannot be excluded on the basis of the study, grade 1 DD, mildly dilated LA/RA, mild TR; grossly unchanged from previous study in 2014   - previously on oral furosemide 20 mg daily as needed for lower extremity edema     5  Dyslipidemia  - lipid profile (6/25/2022) - / triglycerides 169/ HDL 57/  LDL 170   - continue atorvastatin     6  Morbid obesity  - BMI 38 6     7  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  - creatinine 1 3 on admission > 1 44 this morning  - baseline creatinine 1 3-1 5    Subjective:   Patient seen and examined  No significant events overnight   Patient is sitting up comfortably in bed this morning eating breakfast  Feels well  Offers no complaints  Objective:   Vitals: Blood pressure 148/90, pulse 76, temperature (!) 97 °F (36 1 °C), resp  rate 16, height 5' 3" (1 6 m), weight 99 8 kg (220 lb 0 3 oz), SpO2 95 %  , Body mass index is 38 97 kg/m² ,   Orthostatic Blood Pressures    Flowsheet Row Most Recent Value   Blood Pressure 148/90 filed at 06/29/2022 0700   Patient Position - Orthostatic VS Sitting filed at 06/24/2022 2345          Intake/Output Summary (Last 24 hours) at 6/29/2022 0825  Last data filed at 6/29/2022 0701  Gross per 24 hour   Intake 360 ml   Output 800 ml   Net -440 ml       Physical Exam  Constitutional:       General: She is not in acute distress  Appearance: She is obese  She is not ill-appearing  Comments: YO LANGT:      Head: Normocephalic and atraumatic  Nose: Nose normal       Mouth/Throat:      Mouth: Mucous membranes are moist       Pharynx: Oropharynx is clear  Eyes:      Pupils: Pupils are equal, round, and reactive to light  Cardiovascular:      Rate and Rhythm: Normal rate and regular rhythm  Pulses: Normal pulses  Heart sounds: Normal heart sounds  No murmur heard  No friction rub  No gallop  Pulmonary:      Effort: Pulmonary effort is normal       Breath sounds: Normal breath sounds  No wheezing, rhonchi or rales  Abdominal:      General: There is no distension  Palpations: Abdomen is soft  Tenderness: There is no abdominal tenderness  Musculoskeletal:         General: Normal range of motion  Cervical back: Normal range of motion  Right lower leg: No edema  Left lower leg: No edema  Skin:     General: Skin is warm and dry  Capillary Refill: Capillary refill takes less than 2 seconds  Neurological:      General: No focal deficit present  Mental Status: She is alert and oriented to person, place, and time     Psychiatric:         Mood and Affect: Mood normal          Behavior: Behavior normal      Medications:    Current Facility-Administered Medications:     acetaminophen (TYLENOL) tablet 650 mg, 650 mg, Oral, Q6H PRN, Moe López MD    allopurinol (ZYLOPRIM) tablet 100 mg, 100 mg, Oral, Daily, Moe López MD, 100 mg at 06/28/22 0850    aluminum-magnesium hydroxide-simethicone (MYLANTA) oral suspension 30 mL, 30 mL, Oral, Q6H PRN, Moe López MD    amLODIPine (NORVASC) tablet 10 mg, 10 mg, Oral, Daily, RAO Lutz    aspirin chewable tablet 81 mg, 81 mg, Oral, Daily, Sandra Uriostegui MD, 81 mg at 06/28/22 0850    atorvastatin (LIPITOR) tablet 40 mg, 40 mg, Oral, QPM, Sandra Uriostegui MD, 40 mg at 06/28/22 1744    busPIRone (BUSPAR) tablet 10 mg, 10 mg, Oral, TID, Moe López MD, 10 mg at 06/28/22 2138    carvedilol (COREG) tablet 25 mg, 25 mg, Oral, BID With Meals, RAO Lutz, 25 mg at 06/28/22 1744    cefTRIAXone (ROCEPHIN) 1,000 mg in dextrose 5 % 50 mL IVPB, 1,000 mg, Intravenous, Q24H, Sandra Uriostegui MD, Last Rate: 100 mL/hr at 06/28/22 0924, 1,000 mg at 06/28/22 5321    cholecalciferol (VITAMIN D3) tablet 1,000 Units, 1,000 Units, Oral, Daily, Moe López MD, 1,000 Units at 06/28/22 0850    co-enzyme Q-10 capsule 100 mg, 100 mg, Oral, Daily, Moe López MD, 100 mg at 06/28/22 0849    cyanocobalamin (VITAMIN B-12) tablet 1,000 mcg, 1,000 mcg, Oral, Daily, Moe López MD, 1,000 mcg at 06/28/22 0849    dexamethasone (DECADRON) injection 4 mg, 4 mg, Intravenous, Q8H [address], Sandra Uriostegui MD, 4 mg at 06/29/22 0522    docusate sodium (COLACE) capsule 100 mg, 100 mg, Oral, BID PRN, Moe López MD    famotidine (PEPCID) tablet 20 mg, 20 mg, Oral, Daily, Moe López MD, 20 mg at 06/28/22 0850    furosemide (LASIX) tablet 20 mg, 20 mg, Oral, Daily PRN, Moe López MD    heparin (porcine) subcutaneous injection 5,000 Units, 5,000 Units, Subcutaneous, Q8H [address], Judge Barbara MD, 5,000 Units at 06/29/22 0522   labetalol (NORMODYNE) injection 10 mg, 10 mg, Intravenous, Q6H PRN, Kavitha Hernandez RAO Mauricio, 10 mg at 06/28/22 6255    levothyroxine tablet 50 mcg, 50 mcg, Oral, Early Morning, Jey Woody MD, 50 mcg at 06/29/22 0522    lisinopril (ZESTRIL) tablet 20 mg, 20 mg, Oral, Daily, Jey Woody MD, 20 mg at 06/28/22 0850    loratadine (CLARITIN) tablet 10 mg, 10 mg, Oral, Daily, Jey Woody MD, 10 mg at 06/28/22 0850    LORazepam (ATIVAN) tablet 0 5 mg, 0 5 mg, Oral, BID, Jey Woody MD, 0 5 mg at 06/28/22 2138    magnesium oxide (MAG-OX) tablet 400 mg, 400 mg, Oral, Daily, Jey Woody MD, 400 mg at 06/28/22 0850    metroNIDAZOLE (FLAGYL) IVPB (premix) 500 mg 100 mL, 500 mg, Intravenous, Q8H, Mely Brambila MD, Last Rate: 200 mL/hr at 06/29/22 0018, 500 mg at 06/29/22 0018    multivitamin-minerals (CENTRUM) tablet 1 tablet, 1 tablet, Oral, Daily, Jey Woody MD, 1 tablet at 06/28/22 0850    ondansetron (ZOFRAN) injection 4 mg, 4 mg, Intravenous, Q6H PRN, Jey Woody MD, 4 mg at 06/27/22 1111    pantoprazole (PROTONIX) EC tablet 40 mg, 40 mg, Oral, Daily, Jey Woody MD, 40 mg at 06/28/22 0850     Labs & Results:      Results from last 7 days   Lab Units 06/29/22  0607 06/28/22  0612 06/27/22  0547   WBC Thousand/uL 18 23* 9 81 12 82*   HEMOGLOBIN g/dL 13 6 12 7 12 1   HEMATOCRIT % 40 9 37 7 37 2   PLATELETS Thousands/uL 242 225 224     Results from last 7 days   Lab Units 06/27/22  0547 06/25/22  1333   TRIGLYCERIDES mg/dL 119 169*   HDL mg/dL 58 57     Results from last 7 days   Lab Units 06/29/22  0607 06/28/22  0612 06/27/22  0547 06/26/22  0807   POTASSIUM mmol/L 4 4 4 1 3 9 3 9   CHLORIDE mmol/L 101 99* 99* 99*   CO2 mmol/L 27 24 26 29   BUN mg/dL 34* 30* 28* 26*   CREATININE mg/dL 1 44* 1 62* 1 45* 1 46*   CALCIUM mg/dL 9 3 9 0 8 8 9 0   ALK PHOS U/L  --  152* 126* 135*   ALT U/L  --  36 20 20   AST U/L  --  37 14 15     Results from last 7 days   Lab Units 06/25/22  1602 06/24/22  1721   INR  1 02 1 00   PTT seconds 21* 26     Results from last 7 days   Lab Units 06/25/22  1333   MAGNESIUM mg/dL 2 2

## 2022-06-29 NOTE — ASSESSMENT & PLAN NOTE
Patient with possible diverticulitis on CT abdomen and pelvis  Started on ceftriaxone and Flagyl; day 4/7

## 2022-06-29 NOTE — ASSESSMENT & PLAN NOTE
Appreciate cardiology input  Metoprolol changed to coreg 25 mg b i d  On 06/28-cardiology started amlodipine; continue 20 mg of lisinopril  Will continue to monitor blood pressure  Blood pressure still higher than goal; monitor overnight on increased antihypertensives as needed  6/29-appreciate cardiology recommendations, blood pressure much better controlled; monitor overnight  PT/OT recommending rehab; discuss with case management moving forward

## 2022-06-29 NOTE — PROGRESS NOTES
[facility]  Progress Note - Dennis Gómez 12/24/1930, 80 y o  female MRN: [id number]  [facility] Encounter: [id number]  Primary Care Provider: Jaymie Wiley DO   Date and time admitted to hospital: 6/24/2022  4:04 PM    Diverticulitis  Assessment & Plan  Patient with possible diverticulitis on CT abdomen and pelvis  Started on ceftriaxone and Flagyl; day 4/7    Stroke (cerebrum) [facility])  Assessment & Plan  Neurology input appreciated-continue aspirin/statin; better blood pressure control  Repeat MRI in 3 months  No new symptoms   Appreciated neurosurgery input appreciated      Dizziness  Assessment & Plan  Patient complaining of dizziness for the past 24 hours  Now resolved  Will monitor closely       Thrombocytopenia (HCC)  Assessment & Plan  Likely platelet count from initial CBC likely error  Brain mass  Assessment & Plan  Brain mass noted in CTA; may be meningioma  MRI - meningioma with ?edema   Neurosurgery input appreciated; meningioma likely incidental finding and no surgical intervention planned  Follow-up in 3 months with MRI; neurosurgery placed order  Will continue steroid taper        Primary hypothyroidism  Assessment & Plan  Continue levothyroxine 50 mcg daily  TSH within normal limits  Mild vitamin D deficiency  Assessment & Plan  On cholecalciferol  Hyperuricemia  Assessment & Plan  Continue allopurinol 100 mg daily  Hyperlipidemia  Assessment & Plan  Started on high-intensity statin    * Uncontrolled hypertension  Assessment & Plan  Appreciate cardiology input  Metoprolol changed to coreg 25 mg b i d  On 06/28-cardiology started amlodipine; continue 20 mg of lisinopril  Will continue to monitor blood pressure  Blood pressure still higher than goal; monitor overnight on increased antihypertensives as needed  6/29-appreciate cardiology recommendations, blood pressure much better controlled; monitor overnight    PT/OT recommending rehab; discuss with case management moving forward  VTE Pharmacologic Prophylaxis:   Pharmacologic: Heparin  Mechanical VTE Prophylaxis in Place: Yes    Patient Centered Rounds: I have performed bedside rounds with nursing staff today  Discussions with Specialists or Other Care Team Provider:     Education and Discussions with Family / Patient: Discussed treatment plan with family and patient who agree with current plan; encouraged to ask questions and participate  Time Spent for Care: 30 minutes  More than 50% of total time spent on counseling and coordination of care as described above  Current Length of Stay: 5 day(s)    Current Patient Status: Inpatient   Certification Statement: The patient will continue to require additional inpatient hospital stay due to Treatment of uncontrolled blood pressure    Discharge Plan: To be determined, likely tomorrow    Code Status: Level 1 - Full Code      Subjective:   Patient seen examined bedside, no acute distress or discomfort noted  Blood pressure much better controlled; monitored by Cardiology use up titrating medications  Discussed with daughter at bedside; PT/OT recommending patient go to acute care rehab  Family agrees  Continues on antibiotics for diverticulitis; will continue overnight and consider transitioning to p o  If scheduled for discharge in the next 24-48 hours  Will continue steroids and conduct steroid taper moving forward  Objective:     Vitals:   Temp (24hrs), Av 4 °F (36 3 °C), Min:97 °F (36 1 °C), Max:98 1 °F (36 7 °C)    Temp:  [97 °F (36 1 °C)-98 1 °F (36 7 °C)] 97 °F (36 1 °C)  HR:  [76-83] 76  Resp:  [16-20] 16  BP: (148-177)/(84-90) 148/90  SpO2:  [94 %-95 %] 95 %  Body mass index is 38 97 kg/m²  Input and Output Summary (last 24 hours):        Intake/Output Summary (Last 24 hours) at 2022 1810  Last data filed at 2022 0701  Gross per 24 hour   Intake 120 ml   Output 800 ml   Net -680 ml       Physical Exam:     Physical Exam  Vitals and nursing note reviewed  Constitutional:       General: She is not in acute distress  Appearance: She is well-developed  She is obese  HENT:      Head: Normocephalic and atraumatic  Eyes:      Conjunctiva/sclera: Conjunctivae normal    Cardiovascular:      Rate and Rhythm: Normal rate and regular rhythm  Heart sounds: No murmur heard  Pulmonary:      Effort: Pulmonary effort is normal  No respiratory distress  Breath sounds: Normal breath sounds  Abdominal:      Palpations: Abdomen is soft  Tenderness: There is no abdominal tenderness  Musculoskeletal:      Cervical back: Neck supple  Skin:     General: Skin is warm and dry  Neurological:      Mental Status: She is alert  Psychiatric:      Comments: Confused           Additional Data:     Labs:    Results from last 7 days   Lab Units 06/29/22  0607   WBC Thousand/uL 18 23*   HEMOGLOBIN g/dL 13 6   HEMATOCRIT % 40 9   PLATELETS Thousands/uL 242   NEUTROS PCT % 92*   LYMPHS PCT % 5*   MONOS PCT % 2*   EOS PCT % 0     Results from last 7 days   Lab Units 06/29/22  0607 06/28/22  0612   SODIUM mmol/L 135* 133*   POTASSIUM mmol/L 4 4 4 1   CHLORIDE mmol/L 101 99*   CO2 mmol/L 27 24   BUN mg/dL 34* 30*   CREATININE mg/dL 1 44* 1 62*   ANION GAP mmol/L 7 10   CALCIUM mg/dL 9 3 9 0   ALBUMIN g/dL  --  3 0*   TOTAL BILIRUBIN mg/dL  --  0 48   ALK PHOS U/L  --  152*   ALT U/L  --  36   AST U/L  --  37   GLUCOSE RANDOM mg/dL 154* 205*     Results from last 7 days   Lab Units 06/25/22  1602   INR  1 02         Results from last 7 days   Lab Units 06/27/22  0547 06/25/22  1333   HEMOGLOBIN A1C % 6 0* 6 0*     Results from last 7 days   Lab Units 06/24/22  1737 06/24/22  1654   LACTIC ACID mmol/L 1 9  --    PROCALCITONIN ng/ml  --  0 06           * I Have Reviewed All Lab Data Listed Above  * Additional Pertinent Lab Tests Reviewed:  All Labs Within Last 24 Hours Reviewed    Imaging:    Imaging Reports Reviewed Today Include:   Imaging Personally Reviewed by Myself Includes:      Recent Cultures (last 7 days):     Results from last 7 days   Lab Units 06/24/22  1654 06/24/22  1645   BLOOD CULTURE  No Growth After 4 Days  No Growth After 4 Days         Last 24 Hours Medication List:   Current Facility-Administered Medications   Medication Dose Route Frequency Provider Last Rate    acetaminophen  650 mg Oral Q6H PRN Bob Rubalcava MD      allopurinol  100 mg Oral Daily Bob Rubalcava MD      aluminum-magnesium hydroxide-simethicone  30 mL Oral Q6H PRN Bob Rubalcava MD      amLODIPine  10 mg Oral Daily RAO Leo      aspirin  81 mg Oral Daily Zafar Alston MD      atorvastatin  40 mg Oral QPM Zafar Alston MD      busPIRone  10 mg Oral TID Bob Rubalcava MD      carvedilol  25 mg Oral BID With Meals Raiza Denise 1, CRNP      cefTRIAXone  1,000 mg Intravenous Q24H Zafar Alston MD Stopped (06/29/22 0945)    cholecalciferol  1,000 Units Oral Daily Bob Rubalcava MD      co-enzyme Q-10  100 mg Oral Daily Bob Rubalcava MD      vitamin B-12  1,000 mcg Oral Daily Bob Rubalcava MD      dexamethasone  4 mg Intravenous [facility] Zafar Alston MD      docusate sodium  100 mg Oral BID PRN Bob Rubalcava MD      famotidine  20 mg Oral Daily Bob Rubalcava MD      furosemide  20 mg Oral Daily PRN Bob Rubalcava MD      heparin (porcine)  5,000 Units Subcutaneous [facility] Hoang Curtis MD      labetalol  10 mg Intravenous Q6H PRN RAO Baca      levothyroxine  50 mcg Oral Early Morning Bob Rubalcava MD      lisinopril  20 mg Oral Daily Bob Rubalcava MD      loratadine  10 mg Oral Daily Bob Rubalcava MD      LORazepam  0 5 mg Oral BID Bob Rubalcava MD      magnesium oxide  400 mg Oral Daily Bob Rubalcava MD      metroNIDAZOLE  500 mg Intravenous Q8H Zafar Alston  mg (06/29/22 0940)    multivitamin-minerals  1 tablet Oral Daily Eber Gricel Lew MD      ondansetron  4 mg Intravenous Q6H PRN Jocelin Salgado MD      pantoprazole  40 mg Oral Daily Jocelin Salgado MD          Today, Patient Was Seen By: Miles Dodge MD    ** Please Note: Dictation voice to text software may have been used in the creation of this document   **

## 2022-06-30 NOTE — PLAN OF CARE
Problem: Potential for Falls  Goal: Patient will remain free of falls  Description: INTERVENTIONS:  - Educate patient/family on patient safety including physical limitations  - Instruct patient to call for assistance with activity   - Consult OT/PT to assist with strengthening/mobility   - Keep Call bell within reach  - Keep bed low and locked with side rails adjusted as appropriate  - Keep care items and personal belongings within reach  - Initiate and maintain comfort rounds  - Make Fall Risk Sign visible to staff  Problem: Knowledge Deficit  Goal: Patient/family/caregiver demonstrates understanding of disease process, treatment plan, medications, and discharge instructions  Description: Complete learning assessment and assess knowledge base  Interventions:  - Provide teaching at level of understanding  - Provide teaching via preferred learning methods  Outcome: Progressing     Problem: Prexisting or High Potential for Compromised Skin Integrity  Goal: Skin integrity is maintained or improved  Description: INTERVENTIONS:  - Identify patients at risk for skin breakdown  - Assess and monitor skin integrity  - Assess and monitor nutrition and hydration status  - Monitor labs   - Assess for incontinence   - Turn and reposition patient  - Assist with mobility/ambulation  - Relieve pressure over bony prominences  - Avoid friction and shearing  - Provide appropriate hygiene as needed including keeping skin clean and dry  - Evaluate need for skin moisturizer/barrier cream  - Collaborate with interdisciplinary team   - Patient/family teaching  - Consider wound care consult   Outcome: Progressing     Problem: Nutrition/Hydration-ADULT  Goal: Nutrient/Hydration intake appropriate for improving, restoring or maintaining nutritional needs  Description: Monitor and assess patient's nutrition/hydration status for malnutrition  Collaborate with interdisciplinary team and initiate plan and interventions as ordered    Monitor patient's weight and dietary intake as ordered or per policy  Utilize nutrition screening tool and intervene as necessary  Determine patient's food preferences and provide high-protein, high-caloric foods as appropriate       INTERVENTIONS:  - Monitor oral intake, urinary output, labs, and treatment plans  - Assess nutrition and hydration status and recommend course of action  - Evaluate amount of meals eaten  - Assist patient with eating if necessary   - Allow adequate time for meals  - Recommend/ encourage appropriate diets, oral nutritional supplements, and vitamin/mineral supplements  - Order, calculate, and assess calorie counts as needed  - Recommend, monitor, and adjust tube feedings and TPN/PPN based on assessed needs  - Assess need for intravenous fluids  - Provide specific nutrition/hydration education as appropriate  - Include patient/family/caregiver in decisions related to nutrition  Outcome: Progressing     - Apply yellow socks and bracelet for high fall risk patients  - Consider moving patient to room near nurses station  Outcome: Progressing     Problem: PAIN - ADULT  Goal: Verbalizes/displays adequate comfort level or baseline comfort level  Description: Interventions:  - Encourage patient to monitor pain and request assistance  - Assess pain using appropriate pain scale  - Administer analgesics based on type and severity of pain and evaluate response  - Implement non-pharmacological measures as appropriate and evaluate response  - Consider cultural and social influences on pain and pain management  - Notify physician/advanced practitioner if interventions unsuccessful or patient reports new pain  Outcome: Progressing

## 2022-06-30 NOTE — PLAN OF CARE
Problem: PHYSICAL THERAPY ADULT  Goal: Performs mobility at highest level of function for planned discharge setting  See evaluation for individualized goals  Description: Treatment/Interventions: ADL retraining, Functional transfer training, LE strengthening/ROM, Cognitive reorientation, Patient/family training, Equipment eval/education, Bed mobility, Gait training, Spoke to nursing, Spoke to case management, OT          See flowsheet documentation for full assessment, interventions and recommendations  Outcome: Progressing  Note: Prognosis: Good  Problem List: Decreased strength, Decreased endurance, Decreased mobility, Impaired balance, Decreased coordination, Impaired judgement, Decreased safety awareness  Assessment: The pt  was fatigued this afternoon after sitting in the chair for six hours  She followed all one-step instructions with increased time as well as occasional repetition due to her hearing deficiencies  Today she was able to stand with assistance of one person, but she was only able to take a few steps in order to transfer to the chair  She was markedly incontinent of urine and bowel upon standing, and she was able to roll in order to be cleaned  She was positioned in the reclined chair position with all needs within reach  Barriers to Discharge: Inaccessible home environment, Decreased caregiver support        PT Discharge Recommendation: Post acute rehabilitation services          See flowsheet documentation for full assessment

## 2022-06-30 NOTE — CASE MANAGEMENT
Case Management Discharge Planning Note    Patient name Carlos [facility]  Location [facility]-01 MRN [id number]  : 1930 Date 2022       Current Admission Date: 2022  Current Admission Diagnosis:Uncontrolled hypertension   Patient Active Problem List    Diagnosis Date Noted    Diverticulitis 2022    Stroke [facility]cerebrum) ([facility] [address] ) 2022    Brain mass 2022    Thrombocytopenia ([facility] [address] ) 2022    Dizziness 2022    Visual hallucination 2020    Encounter for follow-up examination after completed treatment for malignant neoplasm 2019    History of right breast cancer 2018    Macular degeneration 2017    Mixed stress and urge urinary incontinence 2017    Primary localized osteoarthritis of both knees 2016    PAD (peripheral artery disease) ([facility] [address] ) 2016    Hyperuricemia 10/09/2015    Mild vitamin D deficiency 2015    Spinal stenosis 2014    Gait disturbance 2014    Generalized osteoarthritis 2013    Peripheral neuropathy 2013    Other chronic pain 10/24/2012    Uncontrolled hypertension 2012    Obesity 2012    Hyperlipidemia 2012    Generalized anxiety disorder 2012    Insomnia 2012    Primary hypothyroidism 2012    3-vessel coronary artery disease 2012    Depression 2012    Esophageal reflux 2012      LOS (days): 6  Geometric Mean LOS (GMLOS) (days): 4 40  Days to GMLOS:-1 2     OBJECTIVE:  Risk of Unplanned Readmission Score: 12 14         Current admission status: Inpatient   Preferred Pharmacy:   [facility] [address]  [address]  [place] [address]  Phone: [phone] Fax: [phone] [place] [address]  [place] [address]  Phone: [phone] Fax: [phone]    Primary Care Provider: Gutierrez Glez DO    Primary Insurance: MEDICARE  Secondary Insurance: [employer]ET[employer]    DISCHARGE DETAILS:             IMM Given (Date):: 06/30/22  IMM Given to[de-identified] Family  Family notified[de-identified] Dtr Bart Lawrence  Additional Comments: CM spoke to pt's dtr who confirmed that pt was vaccinated for covid with [facility] 8/2/21, 8/30/21 and 2/11/22  CM reviewed list of accepting facilities and pt's dtr stated that she would like pt to transition to [facility] as its closer to their house  [facility]  CM requested BLS transport  Provider made aware and covid test was requested      Accepting Facility Name, [address] : [facility]  Receiving Facility/Agency Phone Number: [phone]  Facility/Agency Fax Number: [phone]

## 2022-06-30 NOTE — PROGRESS NOTES
06/30/22 1400   Clinical Encounter Type   Visited With Patient   Routine Visit Follow-up   Sacramental Encounters   Communion Given Indicator Yes   Sacrament Other   (Armstrong- Itzel Macadamia)

## 2022-06-30 NOTE — NURSING NOTE
Patient discharged to [facility]  AVS printed out and sent with patient/transport team  Report on patient given to RN at [facility]  Patient's vs stable, Ax1-2 w/ RW, no medical concerns at this time   Patient transported via stretcher/BLS van accompanied by transport team

## 2022-06-30 NOTE — ASSESSMENT & PLAN NOTE
Appreciate cardiology input  Metoprolol changed to coreg 25 mg b i d  On 06/28-cardiology started amlodipine; continue 20 mg of lisinopril  Will continue to monitor blood pressure  Blood pressure still higher than goal; monitor overnight on increased antihypertensives as needed  6/29-appreciate cardiology recommendations, blood pressure much better controlled; monitor overnight  PT/OT recommending rehab; discuss with case management moving forward  6/30-blood pressure much better controlled; continue present therapy with amlodipine 10 mg daily, Coreg 25 mg b i d  And lisinopril 20 mg daily  Follow-up with primary care in the outpatient setting

## 2022-06-30 NOTE — OCCUPATIONAL THERAPY NOTE
Occupational Therapy Progress Note     Patient Name: Lizzie Schofield  SKQYY'N Date: 6/30/2022  Problem List  Principal Problem:    Uncontrolled hypertension  Active Problems:    Hyperlipidemia    Hyperuricemia    Mild vitamin D deficiency    Primary hypothyroidism    Brain mass    Stroke (cerebrum) ([facility] [address] )    Diverticulitis            06/30/22 1033   OT Last Visit   OT Visit Date 06/30/22   Note Type   Note Type Treatment   Restrictions/Precautions   Weight Bearing Precautions Per Order No   Other Precautions Cognitive; Chair Alarm; Bed Alarm;Multiple lines; Fall Risk;Hard of hearing  (pt with b/l hearing aids, [place] with them)   Lifestyle   Autonomy Per chart, pta, pt requires A with ADLs/IADLs, RW for fnxl mobility   Reciprocal Relationships Dtr, WILLIAM   Service to Others Retired   [address], knitting   Pain Assessment   Pain Assessment Tool 0-10   Pain Score No Pain   ADL   Where Assessed Edge of bed   Grooming Assistance 4  Minimal Assistance   Grooming Deficit Setup;Verbal cueing;Supervision/safety; Increased time to complete   Grooming Comments pt washed hair with shampoo cap and brushed hair with comb   UB Bathing Assistance 4  Minimal Assistance   UB Bathing Deficit Setup;Steadying;Verbal cueing;Supervision/safety; Increased time to complete; Chest;Right arm;Left arm; Abdomen;Perineal area   LB Bathing Assistance 3  Moderate Assistance   LB Bathing Deficit Setup;Steadying;Verbal cueing;Supervision/safety; Increased time to complete;Right lower leg including foot; Left lower leg including foot   UB Dressing Assistance 4  Minimal Assistance   UB Dressing Deficit Setup;Steadying;Verbal cueing;Supervision/safety; Increased time to complete; Thread RUE; Thread LUE   LB Dressing Assistance 3  Moderate Assistance   LB Dressing Deficit Setup;Steadying;Verbal cueing;Supervision/safety; Increased time to complete; Don/doff R sock; Don/doff L sock   LB Dressing Comments pt required Max A to doff/don sock EOB, but Min A sitting EOB in chair with legs elevated   Bed Mobility   Supine to Sit 3  Moderate assistance   Additional items Assist x 1;HOB elevated; Bedrails; Increased time required;Verbal cues;LE management   Sit to Supine Unable to assess   Additional Comments pt incontinent upon standing   Transfers   Sit to Stand 3  Moderate assistance   Additional items Assist x 2; Increased time required;Verbal cues;HOB elevated   Stand to Sit 3  Moderate assistance   Additional items Assist x 2; Increased time required;Verbal cues;Armrests   Additional Comments transfers with RW   Functional Mobility   Functional Mobility 3  Moderate assistance   Additional Comments Ax2 bed to chair   Additional items Rolling walker   Cognition   Overall Cognitive Status Impaired   Arousal/Participation Responsive; Cooperative   Attention Attends with cues to redirect   Orientation Level Oriented to person;Oriented to place  (only oriented to year)   Memory Decreased recall of recent events;Decreased short term memory   Following Commands Follows one step commands with increased time or repetition   Comments pt pleasant and cooperative t/o session   Activity Tolerance   Activity Tolerance Patient tolerated treatment well   Medical Staff Made Aware RN approved for session   Assessment   Assessment Pt seen for OT tx session to address ADL retraining, fxnl transfer training, endurance training, cognitive reorientation, compensatory technique education, energy conservation, and activity engagement  Pt presented supine in bed upon entry  Pt performed Mod A x1 for bed mobility, Mod A x2 for STS trx and fnxl mobility with RW, and Min A for UB ADLs, Mod A for LB ADLs, and Min A for grooming  Pt initally Max A for LB ADLs progressed to Min A with legs elevated  Pt limited by endurance, decreased activity tolerance, fxnl mobility, strength, insight into deficit, and safety awareness   Pt would benefit from continued skilled OT services to address ADL retraining, fxnl transfer training, UE strengthening, endurance training, cognitive reorientation, compensatory technique education, energy conservation, and activity engagement  Pt left sitting OOB in chair with alarm on and all current needs met  OT recommendation to d/c to rehab when medically stable  The patient's raw score on the AM-PAC Daily Activity inpatient short form is 15, standardized score is 34 69, less than 39 4  Patients at this level are likely to benefit from discharge to post-acute rehabilitation services  Please refer to the recommendation of the Occupational Therapist for safe discharge planning  Plan   Treatment Interventions ADL retraining;Functional transfer training;UE strengthening/ROM; Endurance training;Cognitive reorientation;Patient/family training;Equipment evaluation/education; Compensatory technique education;Continued evaluation; Energy conservation; Activityengagement   Goal Expiration Date 07/11/22   OT Treatment Day 1   OT Frequency 3-5x/wk   Recommendation   OT Discharge Recommendation Post acute rehabilitation services   OT - OK to Discharge Yes  (to rehab when medically stable)   AM-PAC Daily Activity Inpatient   Lower Body Dressing 2   Bathing 2   Toileting 2   Upper Body Dressing 3   Grooming 3   Eating 3   Daily Activity Raw Score 15   Daily Activity Standardized Score (Calc for Raw Score >=11) 34 69   AM-[facility] Applied Cognition Inpatient   Following a Speech/Presentation 3   Understanding Ordinary Conversation 4   Taking Medications 4   Remembering Where Things Are Placed or Put Away 2   Remembering List of 4-5 Errands 2   Taking Care of Complicated Tasks 2   Applied Cognition Raw Score 17   Applied Cognition Standardized Score 36 52     Shawn Santoyo, OTS

## 2022-06-30 NOTE — PROGRESS NOTES
Cardiology Progress Note - Team 1  Aisha Cabrera 80 y o  female MRN: [id number]  [facility] Encounter: [id number]      Assessment/Plan:  1  Accelerated hypertension  - possibly provoked in the setting of #3  - /110 on admission - last documented at 159/79 before a m meds  - outpatient BP regimen - lisinopril 20 mg daily, and metoprolol succinate 100 mg t i d   - inpatient BP regimen - lisinopril 20 mg p o  daily, carvedilol 25 mg p o  B i d , amlodipine 10 mg po daily  - assess BP after a m meds are given  - would hold off on up titrating lisinopril given kidney function     2  Acute stroke/brain mass  - MRI brain - punctate foci of acute infarction in the right hippocampus and posterior periventricular white matter, 2 5 cm right frontal meningioma with associated vasogenic edema   - neurosurgery following - recommend medical management with repeat MRI brain in 3 months  - continue aspirin 81 mg daily p o , atorvastatin 40 mg p o  daily     3  CAD s/p CABG x 6 (2009)  - no recent ischemic testing for review  - subjectively denies any anginal symptoms  - HS troponin unremarkable  - presenting EKG on admission NSR, nonspecific ST T-wave abnormalities  - continue carvedilol, aspirin, atorvastatin     4  Chronic HFpEF  - appears compensated  - echo (6/26/22) - LVEF 65%, RWM cannot be excluded on the basis of the study, grade 1 DD, mildly dilated LA/RA, mild TR; grossly unchanged from previous study in 2014   - previously on oral furosemide 20 mg daily as needed for lower extremity edema     5  Dyslipidemia  - lipid profile (6/25/2022) - / triglycerides 169/ HDL 57/  LDL 170   - continue atorvastatin     6  Morbid obesity  - BMI 38 9     7  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  - creatinine 1 3 on admission > 1 44 yesterday; no a m labs  - baseline creatinine 1 3-1 5    Subjective:   Patient seen and examined  No significant events overnight  Patient feels well this morning  Denies any CP or SOB      Objective: Vitals: Blood pressure 159/79, pulse 78, temperature (!) 97 2 °F (36 2 °C), resp  rate 17, height 5' 3" (1 6 m), weight 99 8 kg (220 lb 0 3 oz), SpO2 98 %  , Body mass index is 38 97 kg/m² ,   Orthostatic Blood Pressures    Flowsheet Row Most Recent Value   Blood Pressure 159/79 filed at 06/30/2022 4315   Patient Position - Orthostatic VS Sitting filed at 06/24/2022 8845        No intake or output data in the 24 hours ending 06/30/22 0819    Physical Exam  Constitutional:       General: She is not in acute distress  Appearance: She is obese  She is not ill-appearing  Comments: OY LANGT:      Head: Normocephalic and atraumatic  Nose: Nose normal       Mouth/Throat:      Mouth: Mucous membranes are moist       Pharynx: Oropharynx is clear  Eyes:      Pupils: Pupils are equal, round, and reactive to light  Cardiovascular:      Rate and Rhythm: Normal rate and regular rhythm  Pulses: Normal pulses  Heart sounds: Normal heart sounds  No murmur heard  No friction rub  No gallop  Pulmonary:      Effort: Pulmonary effort is normal       Breath sounds: Normal breath sounds  No wheezing, rhonchi or rales  Abdominal:      General: Bowel sounds are normal       Palpations: Abdomen is soft  Musculoskeletal:         General: Normal range of motion  Cervical back: Normal range of motion  Right lower leg: No edema  Left lower leg: No edema  Skin:     General: Skin is warm and dry  Capillary Refill: Capillary refill takes less than 2 seconds  Neurological:      General: No focal deficit present  Mental Status: She is alert and oriented to person, place, and time     Psychiatric:         Mood and Affect: Mood normal          Behavior: Behavior normal      Medications:    Current Facility-Administered Medications:     acetaminophen (TYLENOL) tablet 650 mg, 650 mg, Oral, Q6H PRN, Jocelin Salgado MD    allopurinol (ZYLOPRIM) tablet 100 mg, 100 mg, Oral, Daily, Luli Thomas MD, 100 mg at 06/29/22 0948    aluminum-magnesium hydroxide-simethicone (MYLANTA) oral suspension 30 mL, 30 mL, Oral, Q6H PRN, Luli Thomas MD    amLODIPine (NORVASC) tablet 10 mg, 10 mg, Oral, Daily, RAO Lutz, 10 mg at 06/29/22 7423    aspirin chewable tablet 81 mg, 81 mg, Oral, Daily, Phil Zuluaga MD, 81 mg at 06/29/22 0948    atorvastatin (LIPITOR) tablet 40 mg, 40 mg, Oral, QPM, Phil Zuluaga MD, 40 mg at 06/29/22 1850    busPIRone (BUSPAR) tablet 10 mg, 10 mg, Oral, TID, Luli Thomas MD, 10 mg at 06/29/22 1850    carvedilol (COREG) tablet 25 mg, 25 mg, Oral, BID With Meals, RAO Lutz, 25 mg at 06/29/22 1850    cefTRIAXone (ROCEPHIN) 1,000 mg in dextrose 5 % 50 mL IVPB, 1,000 mg, Intravenous, Q24H, Phil Zuluaga MD, Stopped at 06/29/22 0945    cholecalciferol (VITAMIN D3) tablet 1,000 Units, 1,000 Units, Oral, Daily, Luli Thomas MD, 1,000 Units at 06/29/22 0949    co-enzyme Q-10 capsule 100 mg, 100 mg, Oral, Daily, Luli Thomas MD, 100 mg at 06/29/22 0948    cyanocobalamin (VITAMIN B-12) tablet 1,000 mcg, 1,000 mcg, Oral, Daily, Luli Thomas MD, 1,000 mcg at 06/29/22 0947    dexamethasone (DECADRON) injection 4 mg, 4 mg, Intravenous, Q8H [address], Phil Zuluaga MD, 4 mg at 06/29/22 2205    docusate sodium (COLACE) capsule 100 mg, 100 mg, Oral, BID PRN, Luli Thomas MD    famotidine (PEPCID) tablet 20 mg, 20 mg, Oral, Daily, Luli Thomas MD, 20 mg at 06/29/22 0948    furosemide (LASIX) tablet 20 mg, 20 mg, Oral, Daily PRN, Luli Thomas MD    heparin (porcine) subcutaneous injection 5,000 Units, 5,000 Units, Subcutaneous, Q8H [address], Sascha De Jesus MD, 5,000 Units at 06/29/22 1441    labetalol (NORMODYNE) injection 10 mg, 10 mg, Intravenous, Q6H PRN, RAO Marin, 10 mg at 06/28/22 0343    levothyroxine tablet 50 mcg, 50 mcg, Oral, Early Morning, Luli Thomas MD, 50 mcg at 06/29/22 0522    lisinopril (ZESTRIL) tablet 20 mg, 20 mg, Oral, Daily, Socorro Miner MD, 20 mg at 06/29/22 0948    loratadine (CLARITIN) tablet 10 mg, 10 mg, Oral, Daily, Socorro Miner MD, 10 mg at 06/29/22 0948    LORazepam (ATIVAN) tablet 0 5 mg, 0 5 mg, Oral, BID, Socorro Miner MD, 0 5 mg at 06/29/22 0948    magnesium oxide (MAG-OX) tablet 400 mg, 400 mg, Oral, Daily, Socorro Miner MD, 400 mg at 06/29/22 0948    metroNIDAZOLE (FLAGYL) IVPB (premix) 500 mg 100 mL, 500 mg, Intravenous, Q8H, Yusra Larson MD, Last Rate: 200 mL/hr at 06/30/22 0253, 500 mg at 06/30/22 0253    multivitamin-minerals (CENTRUM) tablet 1 tablet, 1 tablet, Oral, Daily, Socorro Miner MD, 1 tablet at 06/29/22 0948    ondansetron (ZOFRAN) injection 4 mg, 4 mg, Intravenous, Q6H PRN, Socorro Miner MD, 4 mg at 06/27/22 1111    pantoprazole (PROTONIX) EC tablet 40 mg, 40 mg, Oral, Daily, Socorro Miner MD, 40 mg at 06/29/22 0948     Labs & Results:      Results from last 7 days   Lab Units 06/29/22  0607 06/28/22  0612 06/27/22  0547   WBC Thousand/uL 18 23* 9 81 12 82*   HEMOGLOBIN g/dL 13 6 12 7 12 1   HEMATOCRIT % 40 9 37 7 37 2   PLATELETS Thousands/uL 242 225 224     Results from last 7 days   Lab Units 06/27/22  0547 06/25/22  1333   TRIGLYCERIDES mg/dL 119 169*   HDL mg/dL 58 57     Results from last 7 days   Lab Units 06/29/22  0607 06/28/22  0612 06/27/22  0547 06/26/22  0807   POTASSIUM mmol/L 4 4 4 1 3 9 3 9   CHLORIDE mmol/L 101 99* 99* 99*   CO2 mmol/L 27 24 26 29   BUN mg/dL 34* 30* 28* 26*   CREATININE mg/dL 1 44* 1 62* 1 45* 1 46*   CALCIUM mg/dL 9 3 9 0 8 8 9 0   ALK PHOS U/L  --  152* 126* 135*   ALT U/L  --  36 20 20   AST U/L  --  37 14 15     Results from last 7 days   Lab Units 06/25/22  1602 06/24/22  1721   INR  1 02 1 00   PTT seconds 21* 26     Results from last 7 days   Lab Units 06/25/22  1333   MAGNESIUM mg/dL 2 2

## 2022-06-30 NOTE — PLAN OF CARE
Problem: OCCUPATIONAL THERAPY ADULT  Goal: Performs self-care activities at highest level of function for planned discharge setting  See evaluation for individualized goals  Description: Treatment Interventions: ADL retraining, Functional transfer training, UE strengthening/ROM, Endurance training, Cognitive reorientation, Patient/family training, Equipment evaluation/education, Compensatory technique education, Continued evaluation, Energy conservation, Activityengagement          See flowsheet documentation for full assessment, interventions and recommendations  Outcome: Progressing  Note: Limitation: Decreased ADL status, Decreased UE strength, Decreased UE ROM, Decreased Safe judgement during ADL, Decreased cognition, Decreased endurance, Decreased self-care trans, Decreased high-level ADLs  Prognosis: Fair  Assessment: Pt seen for OT tx session to address ADL retraining, fxnl transfer training, endurance training, cognitive reorientation, compensatory technique education, energy conservation, and activity engagement  Pt presented supine in bed upon entry  Pt performed Mod A x1 for bed mobility, Mod A x2 for STS trx and fnxl mobility with RW, and Min A for UB ADLs, Mod A for LB ADLs, and Min A for grooming  Pt initally Max A for LB ADLs progressed to Min A with legs elevated  Pt limited by endurance, decreased activity tolerance, fxnl mobility, strength, insight into deficit, and safety awareness  Pt would benefit from continued skilled OT services to address ADL retraining, fxnl transfer training, UE strengthening, endurance training, cognitive reorientation, compensatory technique education, energy conservation, and activity engagement  Pt left sitting OOB in chair with alarm on and all current needs met  OT recommendation to d/c to rehab when medically stable  The patient's raw score on the AM-PAC Daily Activity inpatient short form is 15, standardized score is 34 69, less than 39 4   Patients at this level are likely to benefit from discharge to post-acute rehabilitation services  Please refer to the recommendation of the Occupational Therapist for safe discharge planning       OT Discharge Recommendation: Post acute rehabilitation services  OT - OK to Discharge: Yes (to rehab when medically stable)

## 2022-06-30 NOTE — ASSESSMENT & PLAN NOTE
Patient with possible diverticulitis on CT abdomen and pelvis  Started on ceftriaxone and Flagyl; day 5 on 06/30  As per new outpatient guidelines, antibiotics in the outpatient setting not required for mild diverticulitis; patient with no abdominal pain complaints and labs and vital stable

## 2022-06-30 NOTE — PHYSICAL THERAPY NOTE
Physical Therapy Progress Note     06/30/22 1600   PT Last Visit   PT Visit Date 06/30/22   Note Type   Note Type Treatment for insurance authorization   Pain Assessment   Pain Assessment Tool 0-10   Pain Score No Pain   Restrictions/Precautions   Other Precautions Cognitive; Chair Alarm; Bed Alarm; Fall Risk;Hard of hearing  (Alarm active post session )   Subjective   Subjective The pt  notes that she is fatigued, and that she would like to go back to bed  Bed Mobility   Rolling R 4  Minimal assistance   Additional items Assist x 1; Increased time required;Verbal cues;LE management   Rolling L 4  Minimal assistance   Additional items Assist x 1; Increased time required;LE management;Verbal cues   Sit to Supine 3  Moderate assistance   Additional items Assist x 1; Increased time required;Verbal cues;LE management   Transfers   Sit to Stand 2  Maximal assistance   Additional items Assist x 1;Verbal cues; Increased time required   Stand to Sit 2  Maximal assistance   Additional items Assist x 1   Stand pivot 2  Maximal assistance   Additional items Assist x 1; Increased time required;Verbal cues   Ambulation/Elevation   Gait pattern Excessively slow; Step to;Short stride; Inconsistent cindy; Shuffling;Decreased foot clearance; Forward Flexion; Improper Weight shift   Gait Assistance 2  Maximal assist   Additional items Assist x 1;Verbal cues; Tactile cues   Assistive Device Rolling walker   Distance 3 feet  Balance   Static Sitting Fair   Dynamic Sitting Fair -   Static Standing Poor   Ambulatory Poor -   Activity Tolerance   Activity Tolerance Patient tolerated treatment well;Patient limited by fatigue   Assessment   Prognosis Good   Problem List Decreased strength;Decreased endurance;Decreased mobility; Impaired balance;Decreased coordination; Impaired judgement;Decreased safety awareness   Assessment The pt  was fatigued this afternoon after sitting in the chair for six hours   She followed all one-step instructions with increased time as well as occasional repetition due to her hearing deficiencies  Today she was able to stand with assistance of one person, but she was only able to take a few steps in order to transfer to the chair  She was markedly incontinent of urine and bowel upon standing, and she was able to roll in order to be cleaned  She was positioned in the reclined chair position with all needs within reach  Barriers to Discharge Inaccessible home environment;Decreased caregiver support   Goals   Patient Goals To rest    STG Expiration Date 07/11/22   PT Treatment Day 1   Plan   Treatment/Interventions Functional transfer training;LE strengthening/ROM; Therapeutic exercise; Endurance training;Cognitive reorientation;Patient/family training;Bed mobility;Gait training   Progress Progressing toward goals   PT Frequency 3-5x/wk   Recommendation   PT Discharge Recommendation Post acute rehabilitation services   AM-PAC Basic Mobility Inpatient   Turning in Bed Without Bedrails 3   Lying on Back to Sitting on Edge of Flat Bed 2   Moving Bed to Chair 2   Standing Up From Chair 2   Walk in Room 2   Climb 3-5 Stairs 1   Basic Mobility Inpatient Raw Score 12   Basic Mobility Standardized Score 32 23   Highest Level Of Mobility   -HLM Goal 4: Move to chair/commode   -HLM Achieved 4: Move to chair/commode       An AM-PAC Basic Mobility standardized score less than 40 78 suggests the patient may benefit from discharge to post-acute rehab services      Tomasa Gabriel, PTA

## 2022-07-01 NOTE — PROGRESS NOTES
Patient identified as New Medicare Bundle through report  Email with bundle communication tool sent to facility with bundle dates, DRG, and LOS  This care manager assistant will continue to monitor via chart and CarePort review throughout bundle episode 
(1) Other Diagnosis

## 2022-07-01 NOTE — DISCHARGE SUMMARY
[facility]  Discharge- Jackelyn Child 12/24/1930, 80 y o  female MRN: [id number]  Unit/Bed#: [facility] 930-01 Encounter: [id number]  Primary Care Provider: Josefina Balderas DO   Date and time admitted to hospital: 6/24/2022  4:04 PM    Diverticulitis  Assessment & Plan  Patient with possible diverticulitis on CT abdomen and pelvis  Started on ceftriaxone and Flagyl; day 5 on 06/30  As per new outpatient guidelines, antibiotics in the outpatient setting not required for mild diverticulitis; patient with no abdominal pain complaints and labs and vital stable  Stroke (cerebrum) [facility])  Assessment & Plan  Neurology input appreciated-continue aspirin/statin; better blood pressure control  Repeat MRI in 3 months  No new symptoms   Appreciated neurosurgery input appreciated      Brain mass  Assessment & Plan  Brain mass noted in CTA; may be meningioma  MRI - meningioma with ?edema   Neurosurgery input appreciated; meningioma likely incidental finding and no surgical intervention planned  Follow-up in 3 months with MRI; neurosurgery placed order  Will continue steroid taper        Primary hypothyroidism  Assessment & Plan  Continue levothyroxine 50 mcg daily  TSH within normal limits  Mild vitamin D deficiency  Assessment & Plan  On cholecalciferol  Hyperuricemia  Assessment & Plan  Continue allopurinol 100 mg daily  Hyperlipidemia  Assessment & Plan  Started on high-intensity statin    * Uncontrolled hypertension  Assessment & Plan  Appreciate cardiology input  Metoprolol changed to coreg 25 mg b i d  On 06/28-cardiology started amlodipine; continue 20 mg of lisinopril  Will continue to monitor blood pressure  Blood pressure still higher than goal; monitor overnight on increased antihypertensives as needed  6/29-appreciate cardiology recommendations, blood pressure much better controlled; monitor overnight    PT/OT recommending rehab; discuss with case management moving forward  6/30-blood pressure much better controlled; continue present therapy with amlodipine 10 mg daily, Coreg 25 mg b i d  And lisinopril 20 mg daily  Follow-up with primary care in the outpatient setting  Discharging Physician / Practitioner: Harvinder Beth MD  PCP: Cade Celestin DO  Admission Date:   Admission Orders (From admission, onward)       Ordered        06/24/22 2056  Inpatient Admission  Once                          Discharge Date: 06/30/22    Medical Problems                 Resolved Problems  Date Reviewed: 6/28/2022   None                   Consultations During Hospital Stay:  Cardiology  Neurosurgery  Gastroenterology  hematology    Procedures Performed:   MRI Brain  US right upper quad  CT abdomen pelvis  CTA head and neck    Significant Findings / Test Results:   MRI Brain:  "Punctate foci of acute infarction in the right hippocampus and   posterior periventricular white matter  Approximate 2 5 cm right frontal meningioma with associated vasogenic edema vasogenic edema  Follow-up with the neurosurgical service is recommended  Mild to moderate cerebral and pontine chronic microangiopathic disease "    CT abd/pelvis:  "Colonic diverticulosis with minimal inflammatory changes around the sigmoid colon which may represent acute diverticulitis  No drainable fluid collection  Recommend posttreatment colonoscopy to rule out underlying neoplasm  Distended gallbladder with gallstones and sludge  If clinically warranted right upper quadrant sonogram may be obtained for further evaluation"     Incidental Findings:   CVA  Thrombocytopenia (likely erroneous lab)    Test Results Pending at Discharge (will require follow up):   none     Outpatient Tests Requested:  CBC/CMP  MRI brain in 3 months    Complications:  none    Reason for Admission: Dizziness and SOB    Hospital Course:       As per HPI:    "Ann Flood is a 80 y o  female who has past medical history significant for hyperlipidemia, peripheral artery disease, peripheral neuropathy, obesity, chronic incontinence, generalized osteoarthritis, anxiety disorder, gastroesophageal reflux disease, CAD with triple-vessel coronary disease, hyperuricemia, hyperlipidemia, vitamin-D deficiency, hypothyroidism, who comes in due to an 1 day history of increasing shortness of breath with dyspnea on exertion  She denies having any chest pain  Likewise the same time, she noted that since this morning she is experiencing mild dizziness somewhat the floor of the room is raised  No room spinning and no tinnitus  No fever  No cough or cold  Likewise, patient states that she has been having shortness of breath with mild exertion  That said, she was brought to the emergency room by EMS who thought that she might have a facial droop earlier  Her blood pressure has been elevated and currently was seen to be 223/100; she does not have any shortness of breath and is not in any cardiorespiratory distress  Patient does not have any manifestation of focal neurologic signs and no change in mental status  No drifting  Currently, patient is sitting on stretcher and is communicative although hard of hearing but she can answer questions appropriately  Patient has admitted to be eating pretzels lately "      Patient presented from home with SOB and dizziness; MRI brain showed CVA and meningioma  Neurosurgery consulted and recommended steroids for brain mass, but likely this was incidental finding  Steroid taper sent with patient on discharge  CVA likely 2/2 to uncontrolled HTN; blood pressure better controlled on above mentioned regime  Continue aspirin; started on statin therapy  Low platelets noted on admission likely lab error  Diverticulitis noted on CT abdomen/pelvis with no symptoms; did complete 5 days of IV antibiotics while in hosptial, but GI recommends nothing on discharge       PT/OT recommended post acute care rehab; family accepted [facility] and patient stable for discharge to follow on rehab  Condition at Discharge: stable     Discharge Day Visit / Exam:     Vitals: Blood Pressure: 135/63 (06/30/22 1507)  Pulse: 65 (06/30/22 1507)  Temperature: (!) 97 3 °F (36 3 °C) (06/30/22 1507)  Temp Source: Oral (06/24/22 2345)  Respirations: 18 (06/30/22 1507)  Height: 5' 3" (160 cm) (06/26/22 1100)  Weight - Scale: 99 8 kg (220 lb 0 3 oz) (06/28/22 0538)  SpO2: 97 % (06/30/22 1507)  Exam:   Physical Exam  Vitals and nursing note reviewed  Constitutional:       General: She is not in acute distress  Appearance: She is well-developed  She is obese  HENT:      Head: Normocephalic and atraumatic  Eyes:      Conjunctiva/sclera: Conjunctivae normal    Cardiovascular:      Rate and Rhythm: Normal rate and regular rhythm  Heart sounds: No murmur heard  Pulmonary:      Effort: Pulmonary effort is normal  No respiratory distress  Breath sounds: Normal breath sounds  Abdominal:      Palpations: Abdomen is soft  Tenderness: There is no abdominal tenderness  Musculoskeletal:      Cervical back: Neck supple  Skin:     General: Skin is warm and dry  Neurological:      Mental Status: She is alert  Comments: AAO x 2; appear to be at baseline         Discussion with Family:Discussed treatment plan with family and patient who agree with current plan; encouraged to ask questions and participate  Discharge instructions/Information to patient and family:   See after visit summary for information provided to patient and family  Provisions for Follow-Up Care:  See after visit summary for information related to follow-up care and any pertinent home health orders        Disposition:     [facility] to [facility] SNF:   Not Applicable to this Patient - Not Applicable to this Patient    Planned Readmission: none     Discharge Statement:  I spent 40 minutes discharging the patient  This time was spent on the day of discharge  I had direct contact with the patient on the day of discharge  Greater than 50% of the total time was spent examining patient, answering all patient questions, arranging and discussing plan of care with patient as well as directly providing post-discharge instructions  Additional time then spent on discharge activities  Discharge Medications:  See after visit summary for reconciled discharge medications provided to patient and family        ** Please Note: This note has been constructed using a voice recognition system **

## 2022-07-02 NOTE — PROGRESS NOTES
[address] [place] [address]  Facility: [facility]    NAME: Messi Banerjee  AGE: 80 y o  SEX: female    DATE OF ENCOUNTER: 7/2/2022    Code status:  Full Code    Assessment and Plan     1  Cerebrovascular accident (CVA), unspecified mechanism ([facility] [address] )    2  Uncontrolled hypertension    3  Primary hypothyroidism    4  Peripheral polyneuropathy    5  Brain mass    6  Gait disturbance    7  Generalized anxiety disorder    8  History of right breast cancer        All medications and routine orders were reviewed and updated as needed  Plan discussed with: Family member    Chief Complaint     Seen for admission at [address] [place] [address]    History of Present Illness     80-year-old female here after hospitalization for weakness and confusion attributed to cerebrovascular accident  During this hospitalization she was also found to have a meningioma which may be a contributing factor to her current neurologic status  She has some baseline confusion and difficulty with memory  She has hearing loss as well  She has limited insight  She feels globally weak with unsteadiness with transfers as well as dizziness  She gets occasional difficulty with dysphagia particularly at mealtime with fluids  Her bowels are stable  She denies any dysuria  She lives with her  and is concerned as to where he is and questions his safety  HISTORY:  Past Medical History:   Diagnosis Date    Arthritis     Malignant neoplasm of breast ([facility] [address] )     Resolved:  Oct 26, 2015    Sciatica     last assessed: July 2, 2013     Past Surgical History:   Procedure Laterality Date    BREAST BIOPSY      BREAST LUMPECTOMY      CARDIAC SURGERY      CATARACT EXTRACTION      CORONARY ARTERY BYPASS GRAFT  2009    ROTATOR CUFF REPAIR      TONSILLECTOMY      TOTAL ABDOMINAL HYSTERECTOMY       Family History   Problem Relation Age of Onset    Other Mother         Maternal coagulation defect complicating pregnancy/childbirth/puerperium     Emphysema Father     Other Sister         Pacemaker Placement     Coronary artery disease Brother     Melanoma Daughter      Social History     Socioeconomic History    Marital status:      Spouse name: None    Number of children: None    Years of education: None    Highest education level: None   Occupational History    None   Tobacco Use    Smoking status: Former Smoker     Types: Cigarettes     Quit date:      Years since quittin 5    Smokeless tobacco: Never Used   Substance and Sexual Activity    Alcohol use: Yes     Comment: social     Drug use: No    Sexual activity: None   Other Topics Concern    None   Social History Narrative    Lives with adult children      Social Determinants of Health     Financial Resource Strain: Not on file   Food Insecurity: No Food Insecurity    Worried About Running Out of Food in the Last Year: Never true    Laury of Food in the Last Year: Never true   Transportation Needs: No Transportation Needs    Lack of Transportation (Medical): No    Lack of Transportation (Non-Medical): No   Physical Activity: Not on file   Stress: Not on file   Social Connections: Not on file   Intimate Partner Violence: Not on file   Housing Stability: Low Risk     Unable to Pay for Housing in the Last Year: No    Number of Places Lived in the Last Year: 1    Unstable Housing in the Last Year: No       Allergies: Allergies   Allergen Reactions    Meperidine Nausea Only    Tramadol Nausea Only       Review of Systems     Review of Systems   Constitutional: Negative for activity change, appetite change, chills, diaphoresis, fatigue and unexpected weight change  HENT: Positive for trouble swallowing  Negative for congestion, ear discharge, ear pain, hearing loss, nosebleeds and rhinorrhea  Eyes: Negative for pain, redness, itching and visual disturbance     Respiratory: Negative for cough, choking, chest tightness and shortness of breath  Cardiovascular: Negative for chest pain and leg swelling  Gastrointestinal: Negative for abdominal pain, blood in stool, constipation, diarrhea and nausea  Endocrine: Negative for cold intolerance, polydipsia and polyphagia  Genitourinary: Negative for dysuria, frequency, hematuria and urgency  Musculoskeletal: Positive for back pain and gait problem  Negative for arthralgias, joint swelling, neck pain and neck stiffness  Skin: Negative for color change and rash  Allergic/Immunologic: Negative for environmental allergies and food allergies  Neurological: Positive for speech difficulty, weakness and light-headedness  Negative for dizziness, tremors, seizures, numbness and headaches  Hematological: Negative for adenopathy  Does not bruise/bleed easily  Psychiatric/Behavioral: Positive for confusion  Negative for behavioral problems, dysphoric mood, hallucinations and self-injury  The patient is nervous/anxious  Medications and orders     All medications reviewed and updated in Nursing Home EMR  Objective     Vitals: per nursing home record    Physical Exam  Constitutional:       Appearance: She is well-developed  HENT:      Head: Normocephalic and atraumatic  Right Ear: External ear normal       Left Ear: External ear normal       Mouth/Throat:      Pharynx: No oropharyngeal exudate  Eyes:      General: No scleral icterus  Right eye: No discharge  Left eye: No discharge  Conjunctiva/sclera: Conjunctivae normal       Pupils: Pupils are equal, round, and reactive to light  Neck:      Thyroid: No thyromegaly  Cardiovascular:      Rate and Rhythm: Normal rate and regular rhythm  Heart sounds: Murmur heard  No gallop  Pulmonary:      Effort: Pulmonary effort is normal  No respiratory distress  Breath sounds: Normal breath sounds  No wheezing or rales     Abdominal:      General: Bowel sounds are normal       Palpations: Abdomen is soft  There is no mass  Tenderness: There is no guarding or rebound  Musculoskeletal:         General: No tenderness or deformity  Normal range of motion  Cervical back: Normal range of motion and neck supple  Right lower leg: Edema present  Left lower leg: Edema present  Lymphadenopathy:      Cervical: No cervical adenopathy  Skin:     General: Skin is warm and dry  Findings: No rash  Neurological:      Mental Status: She is alert  She is disoriented  Cranial Nerves: No cranial nerve deficit  Motor: Weakness present  Coordination: Coordination normal       Gait: Gait abnormal       Deep Tendon Reflexes: Reflexes are normal and symmetric  Reflexes normal    Psychiatric:         Behavior: Behavior normal          Thought Content: Thought content normal          Judgment: Judgment normal          Pertinent Laboratory/Diagnostic Studies: The following labs/studies were reviewed please see chart or hospital paperwork for details  Diagnostic studies from the hospital were reviewed    - Admit for PT OT and medical therapy  We will monitor her labs  She will benefit from gait training and ambulation therapy as well as a speech evaluation regarding dysphagia  She is not a good candidate to return home as she will require a significant amount of assistance      Jada Lane DO  7/2/2022 11:47 AM

## 2022-07-05 NOTE — PROGRESS NOTES
[address]  [facility]: [facility]    NAME: Adelaide Nieves  AGE: 80 y o  SEX: female    DATE OF ENCOUNTER: 7/5/2022    Code status:  DNR w/ Hospitalization    Assessment and Plan     1  Gait disturbance    2  Cerebrovascular accident (CVA), unspecified mechanism ([facility] [address] )    3  3-vessel coronary artery disease    4  Uncontrolled hypertension    5  Brain mass    6  Other depression    7  Generalized anxiety disorder    8  History of right breast cancer        All medications and routine orders were reviewed and updated as needed  Plan discussed with: Family member    Chief Complaint     Interim evaluation    History of Present Illness     The patient is gaining strength  She has a voracious appetite  She has some weakness in her extremities  She denies any dyspnea  Her bowels are moving  She denies any swallowing issues  The following portions of the patient's history were reviewed and updated as appropriate: current medications, past family history, past medical history, past social history, past surgical history and problem list     Allergies: Allergies   Allergen Reactions    Meperidine Nausea Only    Tramadol Nausea Only       Review of Systems     Review of Systems   Constitutional: Negative for activity change, appetite change, chills, diaphoresis, fatigue and unexpected weight change  HENT: Negative for congestion, ear discharge, ear pain, hearing loss, nosebleeds and rhinorrhea  Eyes: Negative for pain, redness, itching and visual disturbance  Respiratory: Negative for cough, choking, chest tightness and shortness of breath  Cardiovascular: Negative for chest pain and leg swelling  Gastrointestinal: Negative for abdominal pain, blood in stool, constipation, diarrhea and nausea  Endocrine: Negative for cold intolerance, polydipsia and polyphagia  Genitourinary: Negative for dysuria, frequency, hematuria and urgency  Musculoskeletal: Positive for gait problem  Negative for arthralgias, back pain, joint swelling, neck pain and neck stiffness  Skin: Negative for color change and rash  Allergic/Immunologic: Negative for environmental allergies and food allergies  Neurological: Positive for weakness  Negative for dizziness, tremors, seizures, speech difficulty, numbness and headaches  Hematological: Negative for adenopathy  Does not bruise/bleed easily  Psychiatric/Behavioral: Positive for confusion  Negative for behavioral problems, dysphoric mood, hallucinations and self-injury  The patient is nervous/anxious  Medications and orders     All medications reviewed and updated in Nursing Home EMR  Objective     Vitals: per nursing home records    Physical Exam  Constitutional:       Appearance: She is well-developed  HENT:      Head: Normocephalic and atraumatic  Right Ear: External ear normal       Left Ear: External ear normal       Mouth/Throat:      Pharynx: No oropharyngeal exudate  Eyes:      General: No scleral icterus  Right eye: No discharge  Left eye: No discharge  Conjunctiva/sclera: Conjunctivae normal       Pupils: Pupils are equal, round, and reactive to light  Neck:      Thyroid: No thyromegaly  Cardiovascular:      Rate and Rhythm: Normal rate  Rhythm irregular  Heart sounds: Normal heart sounds  No murmur heard  No gallop  Pulmonary:      Effort: Pulmonary effort is normal  No respiratory distress  Breath sounds: Normal breath sounds  No wheezing or rales  Abdominal:      General: Bowel sounds are normal       Palpations: Abdomen is soft  There is no mass  Tenderness: There is no guarding or rebound  Musculoskeletal:         General: No tenderness or deformity  Normal range of motion  Cervical back: Normal range of motion and neck supple  Lymphadenopathy:      Cervical: No cervical adenopathy     Skin:     General: Skin is warm and dry       Findings: No rash  Neurological:      Mental Status: She is alert  She is disoriented  Cranial Nerves: No cranial nerve deficit  Motor: Weakness present  Coordination: Coordination normal       Deep Tendon Reflexes: Reflexes are normal and symmetric  Reflexes normal    Psychiatric:         Behavior: Behavior normal          Thought Content: Thought content normal          Judgment: Judgment normal          Pertinent Laboratory/Diagnostic Studies: The following studies were reviewed please see chart or hospital paperwork for details      Space for lab dictation no new diagnostic studies    - Maintain the current medical regimen    Pretty Patient, DO  7/5/2022 1:43 PM

## 2022-07-05 NOTE — PROGRESS NOTES
Email received from [facility] indicating the patient discharged 7/1/22 to [facility]  Call placed to facility where charge nurse confirmed the patient discharged to [facility]  A email was sent to the facility requesting discharge instructions  When CM assistant has received the Discharge paperwork CM assistant will attach to this episode  I have removed myself off of the care team, added the CM to the care team who will follow the patient through the bundle episode, sent the care manager a inbasket notifying them of the bundle episode, updated the BPCI form, and updated the care coordination note

## 2022-07-07 NOTE — PROGRESS NOTES
[address]  [facility]    NAME: David Barkley  AGE: 80 y o  SEX: female    DATE OF ENCOUNTER: 7/7/2022    Code status:  DNR w/ Hospitalization    Assessment and Plan     1  Cerebrovascular accident (CVA), unspecified mechanism ([facility] [address] )    2  3-vessel coronary artery disease    3  Primary hypothyroidism    4  Uncontrolled hypertension    5  Gait disturbance    6  History of right breast cancer    7  Brain mass        All medications and routine orders were reviewed and updated as needed  Plan discussed with: Family member    Chief Complaint     Interim evaluation    History of Present Illness     The patient reports that she has gained some strength  Her weight has climbed since coming here and appears to be fluid mostly in her legs  She denies worsening dyspnea  She had a repeat CBCs which shows the presence of leukocytosis  She remains afebrile  The following portions of the patient's history were reviewed and updated as appropriate: current medications, past family history, past medical history, past social history, past surgical history and problem list     Allergies: Allergies   Allergen Reactions    Meperidine Nausea Only    Tramadol Nausea Only       Review of Systems     Review of Systems   Constitutional: Negative for activity change, appetite change, chills, diaphoresis, fatigue and unexpected weight change  HENT: Negative for congestion, ear discharge, ear pain, hearing loss, nosebleeds and rhinorrhea  Eyes: Negative for pain, redness, itching and visual disturbance  Respiratory: Negative for cough, choking, chest tightness and shortness of breath  Cardiovascular: Positive for leg swelling  Negative for chest pain  Gastrointestinal: Negative for abdominal pain, blood in stool, constipation, diarrhea and nausea  Endocrine: Negative for cold intolerance, polydipsia and polyphagia     Genitourinary: Negative for dysuria, frequency, hematuria and urgency  Musculoskeletal: Positive for gait problem  Negative for arthralgias, back pain, joint swelling, neck pain and neck stiffness  Skin: Negative for color change and rash  Allergic/Immunologic: Negative for environmental allergies and food allergies  Neurological: Positive for weakness  Negative for dizziness, tremors, seizures, speech difficulty, numbness and headaches  Hematological: Negative for adenopathy  Does not bruise/bleed easily  Psychiatric/Behavioral: Positive for confusion  Negative for behavioral problems, dysphoric mood, hallucinations and self-injury  Medications and orders     All medications reviewed and updated in Nursing Home EMR  Objective     Vitals: per nursing home records    Physical Exam  Constitutional:       Appearance: She is well-developed  HENT:      Head: Normocephalic and atraumatic  Right Ear: External ear normal       Left Ear: External ear normal       Mouth/Throat:      Pharynx: No oropharyngeal exudate  Eyes:      General: No scleral icterus  Right eye: No discharge  Left eye: No discharge  Conjunctiva/sclera: Conjunctivae normal       Pupils: Pupils are equal, round, and reactive to light  Neck:      Thyroid: No thyromegaly  Cardiovascular:      Rate and Rhythm: Normal rate and regular rhythm  Heart sounds: Normal heart sounds  No murmur heard  No gallop  Pulmonary:      Effort: Pulmonary effort is normal  No respiratory distress  Breath sounds: Rales present  No wheezing  Abdominal:      General: Bowel sounds are normal       Palpations: Abdomen is soft  There is no mass  Tenderness: There is no guarding or rebound  Musculoskeletal:         General: No tenderness or deformity  Normal range of motion  Cervical back: Normal range of motion and neck supple  Right lower leg: Edema present  Left lower leg: Edema present  Lymphadenopathy:      Cervical: No cervical adenopathy  Skin:     General: Skin is warm and dry  Findings: No rash  Neurological:      Mental Status: She is alert  She is disoriented  Cranial Nerves: No cranial nerve deficit  Motor: Weakness present  Coordination: Coordination normal       Deep Tendon Reflexes: Reflexes are normal and symmetric  Reflexes normal    Psychiatric:         Behavior: Behavior normal          Pertinent Laboratory/Diagnostic Studies: The following labs were reviewed please see chart or hospital paperwork for details      Space for lab dictation leukocytosis present at 24,000    - Adjust diuretics and repeat her [address], DO  7/7/2022 11:17 AM

## 2022-07-11 NOTE — PROGRESS NOTES
[address]  [facility]    NAME: Florencio Sandoval  AGE: 80 y o  SEX: female    DATE OF ENCOUNTER: 7/11/2022    Code status:  DNR w/ Hospitalization    Assessment and Plan     1  Cerebrovascular accident (CVA), unspecified mechanism ([facility] [address] )    2  PAD (peripheral artery disease) ([facility] [address] )    3  Primary hypothyroidism    4  Primary localized osteoarthritis of both knees    5  Other depression    6  Brain mass        All medications and routine orders were reviewed and updated as needed  Plan discussed with: Family member    Chief Complaint     Interim evaluation    History of Present Illness     The patient reports that she had a bad night last evening and did not sleep well  She notes some increased swelling in her legs and dyspnea with exertion  Her bowel habits are stable  Her appetite is at baseline  Her speech seems thick to me today    The following portions of the patient's history were reviewed and updated as appropriate: current medications, past family history, past medical history, past social history, past surgical history and problem list     Allergies: Allergies   Allergen Reactions    Meperidine Nausea Only    Tramadol Nausea Only       Review of Systems     Review of Systems   Constitutional: Negative for activity change, appetite change, chills, diaphoresis, fatigue and unexpected weight change  HENT: Negative for congestion, ear discharge, ear pain, hearing loss, nosebleeds and rhinorrhea  Eyes: Negative for pain, redness, itching and visual disturbance  Respiratory: Positive for shortness of breath  Negative for cough, choking and chest tightness  Cardiovascular: Positive for leg swelling  Negative for chest pain  Gastrointestinal: Negative for abdominal pain, blood in stool, constipation, diarrhea and nausea  Endocrine: Negative for cold intolerance, polydipsia and polyphagia     Genitourinary: Negative for dysuria, frequency, hematuria and urgency  Musculoskeletal: Positive for gait problem  Negative for arthralgias, back pain, joint swelling, neck pain and neck stiffness  Skin: Negative for color change and rash  Allergic/Immunologic: Negative for environmental allergies and food allergies  Neurological: Positive for speech difficulty and weakness  Negative for dizziness, tremors, seizures, numbness and headaches  Hematological: Negative for adenopathy  Does not bruise/bleed easily  Psychiatric/Behavioral: Negative for behavioral problems, dysphoric mood, hallucinations and self-injury  Medications and orders     All medications reviewed and updated in Nursing Home EMR  Objective     Vitals: per nursing home records    Physical Exam  Constitutional:       Appearance: She is well-developed  HENT:      Head: Normocephalic and atraumatic  Right Ear: External ear normal       Left Ear: External ear normal       Mouth/Throat:      Pharynx: No oropharyngeal exudate  Eyes:      General: No scleral icterus  Right eye: No discharge  Left eye: No discharge  Conjunctiva/sclera: Conjunctivae normal       Pupils: Pupils are equal, round, and reactive to light  Neck:      Thyroid: No thyromegaly  Cardiovascular:      Rate and Rhythm: Normal rate and regular rhythm  Heart sounds: Normal heart sounds  No murmur heard  No gallop  Pulmonary:      Effort: Pulmonary effort is normal  No respiratory distress  Breath sounds: Rales present  No wheezing  Abdominal:      General: Bowel sounds are normal       Palpations: Abdomen is soft  There is no mass  Tenderness: There is no guarding or rebound  Musculoskeletal:         General: No tenderness or deformity  Normal range of motion  Cervical back: Normal range of motion and neck supple  Right lower leg: Edema present  Left lower leg: Edema present     Lymphadenopathy:      Cervical: No cervical adenopathy  Skin:     General: Skin is warm and dry  Findings: No rash  Neurological:      Mental Status: She is alert and oriented to person, place, and time  Cranial Nerves: No cranial nerve deficit  Motor: Weakness present  Coordination: Coordination normal       Deep Tendon Reflexes: Reflexes are normal and symmetric  Reflexes normal    Psychiatric:         Behavior: Behavior normal          Thought Content: Thought content normal          Judgment: Judgment normal          Pertinent Laboratory/Diagnostic Studies: The following studies were reviewed please see chart or hospital paperwork for details      Space for lab dictation labs are pending from today    - Increase Lasix to 40 mg daily    Estela Elam DO  7/11/2022 12:07 PM

## 2022-07-11 NOTE — TELEPHONE ENCOUNTER
7/11/22- PT DISCHARGED TO [facility] [phone]  CALLED [place] AND PT HAS BEEN D/C'ED  THEY COULD NOT TELL ME WHERE  I CALLED PTS DAUGHTER, ADRYAN CORNELIUS'S , HERMILO, WHO IS POA GOT ON PHONE  I SPOKE W/ HERMILO REGARDING APPT  PER HERMILO, PT IS NOT DOING WELL AND SHE RESIDES AT [address]  [facility]  ADVISED I WOULD CALL [facility] AND CONFIRM APPT FOR NOW AND AS TIME GOT CLOSER, FAMILY/FACILITY COULD MAKE THE DECISION TO KEEP OR CX  HERMILO AND ADRYAN WERE IN AGREEMENT WITH PLAN  CALLED [facility] AND SPOKE TO Socorro Eckert  CONFIRMED 9/28/22 F/U AND MRI BRAIN 2-3 DAYS PRIOR  FAXED SCRIPT [phone]  ADVISED IMAGES MUST BE LOADED ON DISC AND DISC NEEDED FOR APT   [place] F/U SCHEDULED 9/28/22  PT WILL NEED MRI BRAIN

## 2022-07-14 NOTE — PROGRESS NOTES
[address]  Facility: [facility]    NAME: Carmen Norman  AGE: 80 y o  SEX: female    DATE OF ENCOUNTER: 7/14/2022    Code status:  DNR w/ Hospitalization    Assessment and Plan     1  Cerebrovascular accident (CVA), unspecified mechanism ([facility] [address] )    2  Brain mass    3  Gait disturbance    4  Mixed stress and urge urinary incontinence    5  Spinal stenosis of lumbar region without neurogenic claudication    6  Other depression    7  3-vessel coronary artery disease        All medications and routine orders were reviewed and updated as needed  Plan discussed with: Family member    Chief Complaint     Interim evaluation    History of Present Illness     Patient reports feeling weak  Her fluid intake is suboptimal   Her legs look better  She has been receiving a low-dose diuretic to manage her edema  She mainly sits in her chair and is denying any dyspnea  She does not complain of pain in her back  Her performance status in therapy is in need of improvement    The following portions of the patient's history were reviewed and updated as appropriate: current medications, past family history, past medical history, past social history, past surgical history and problem list     Allergies: Allergies   Allergen Reactions    Meperidine Nausea Only    Tramadol Nausea Only       Review of Systems     Review of Systems   Constitutional: Negative for activity change, appetite change, chills, diaphoresis, fatigue and unexpected weight change  HENT: Negative for congestion, ear discharge, ear pain, hearing loss, nosebleeds and rhinorrhea  Eyes: Negative for pain, redness, itching and visual disturbance  Respiratory: Negative for cough, choking, chest tightness and shortness of breath  Cardiovascular: Positive for leg swelling  Negative for chest pain     Gastrointestinal: Negative for abdominal pain, blood in stool, constipation, diarrhea and nausea  Endocrine: Negative for cold intolerance, polydipsia and polyphagia  Genitourinary: Negative for dysuria, frequency, hematuria and urgency  Musculoskeletal: Negative for arthralgias, back pain, gait problem, joint swelling, neck pain and neck stiffness  Skin: Negative for color change and rash  Allergic/Immunologic: Negative for environmental allergies and food allergies  Neurological: Positive for speech difficulty and weakness  Negative for dizziness, tremors, seizures, numbness and headaches  Hematological: Negative for adenopathy  Does not bruise/bleed easily  Psychiatric/Behavioral: Positive for confusion  Negative for behavioral problems, dysphoric mood, hallucinations and self-injury  Medications and orders     All medications reviewed and updated in Nursing Home EMR  Objective     Vitals: per nursing home records    Physical Exam  Constitutional:       Appearance: She is well-developed  HENT:      Head: Normocephalic and atraumatic  Right Ear: External ear normal       Left Ear: External ear normal       Mouth/Throat:      Pharynx: No oropharyngeal exudate  Eyes:      General: No scleral icterus  Right eye: No discharge  Left eye: No discharge  Conjunctiva/sclera: Conjunctivae normal       Pupils: Pupils are equal, round, and reactive to light  Neck:      Thyroid: No thyromegaly  Cardiovascular:      Rate and Rhythm: Normal rate and regular rhythm  Heart sounds: Normal heart sounds  No murmur heard  No gallop  Pulmonary:      Effort: Pulmonary effort is normal  No respiratory distress  Breath sounds: Normal breath sounds  No wheezing or rales  Abdominal:      General: Bowel sounds are normal       Palpations: Abdomen is soft  There is no mass  Tenderness: There is no guarding or rebound  Musculoskeletal:         General: No tenderness or deformity  Normal range of motion        Cervical back: Normal range of motion and neck supple  Right lower leg: Edema present  Left lower leg: Edema present  Lymphadenopathy:      Cervical: No cervical adenopathy  Skin:     General: Skin is warm and dry  Findings: No rash  Neurological:      Mental Status: She is alert  She is disoriented  Cranial Nerves: No cranial nerve deficit  Coordination: Coordination normal       Deep Tendon Reflexes: Reflexes are normal and symmetric  Reflexes normal       Comments: Dysarthria   Psychiatric:         Behavior: Behavior normal          Thought Content: Thought content normal          Judgment: Judgment normal          Pertinent Laboratory/Diagnostic Studies: The following studies were reviewed please see chart or hospital paperwork for details  Space for lab dictation labs are pending for tomorrow    - We will await the results of tomorrow's BMP to decide on her continued use of Lasix      Jaleel Wells DO  7/14/2022 11:30 AM

## 2022-07-18 ENCOUNTER — TELEPHONE (OUTPATIENT)
Dept: FAMILY MEDICINE CLINIC | Facility: CLINIC | Age: 87
End: 2022-07-18

## 2022-07-18 NOTE — TELEPHONE ENCOUNTER
Patients daughter called this morning  Patient passed away on Saturday and wanted to make you aware  I gave our condolences

## 2022-07-20 ENCOUNTER — EPISODE CHANGES (OUTPATIENT)
Dept: CASE MANAGEMENT | Facility: OTHER | Age: 87
End: 2022-07-20

## 2023-09-08 NOTE — PLAN OF CARE
Problem: MOBILITY - ADULT  Goal: Maintain or return to baseline ADL function  Description: INTERVENTIONS:  -  Assess patient's ability to carry out ADLs; assess patient's baseline for ADL function and identify physical deficits which impact ability to perform ADLs (bathing, care of mouth/teeth, toileting, grooming, dressing, etc )  - Assess/evaluate cause of self-care deficits   - Assess range of motion  - Assess patient's mobility; develop plan if impaired  - Assess patient's need for assistive devices and provide as appropriate  - Encourage maximum independence but intervene and supervise when necessary  - Involve family in performance of ADLs  - Assess for home care needs following discharge   - Consider OT consult to assist with ADL evaluation and planning for discharge  - Provide patient education as appropriate  Outcome: Progressing     Problem: Potential for Falls  Goal: Patient will remain free of falls  Description: INTERVENTIONS:  - Educate patient/family on patient safety including physical limitations  - Instruct patient to call for assistance with activity   - Consult OT/PT to assist with strengthening/mobility   - Keep Call bell within reach  - Keep bed low and locked with side rails adjusted as appropriate  - Keep care items and personal belongings within reach  - Initiate and maintain comfort rounds  - Make Fall Risk Sign visible to staff  - Apply yellow socks and bracelet for high fall risk patients  - Consider moving patient to room near nurses station  Outcome: Progressing     Problem: SAFETY ADULT  Goal: Maintain or return to baseline ADL function  Description: INTERVENTIONS:  -  Assess patient's ability to carry out ADLs; assess patient's baseline for ADL function and identify physical deficits which impact ability to perform ADLs (bathing, care of mouth/teeth, toileting, grooming, dressing, etc )  - Assess/evaluate cause of self-care deficits   - Assess range of motion  - Assess patient's mobility; develop plan if impaired  - Assess patient's need for assistive devices and provide as appropriate  - Encourage maximum independence but intervene and supervise when necessary  - Involve family in performance of ADLs  - Assess for home care needs following discharge   - Consider OT consult to assist with ADL evaluation and planning for discharge  - Provide patient education as appropriate  Outcome: Progressing  Goal: Patient will remain free of falls  Description: INTERVENTIONS:  - Educate patient/family on patient safety including physical limitations  - Instruct patient to call for assistance with activity   - Consult OT/PT to assist with strengthening/mobility   - Keep Call bell within reach  - Keep bed low and locked with side rails adjusted as appropriate  - Keep care items and personal belongings within reach  - Initiate and maintain comfort rounds  - Make Fall Risk Sign visible to staff  - Offer Toileting every 2 Hours, in advance of need  - Initiate/Maintain bed alarm  - Consider moving patient to room near nurses station  Outcome: Progressing Simple: Patient demonstrates quick and easy understanding